# Patient Record
Sex: FEMALE | Race: WHITE | Employment: UNEMPLOYED | URBAN - METROPOLITAN AREA
[De-identification: names, ages, dates, MRNs, and addresses within clinical notes are randomized per-mention and may not be internally consistent; named-entity substitution may affect disease eponyms.]

---

## 2017-04-29 ENCOUNTER — HOSPITAL ENCOUNTER (EMERGENCY)
Age: 42
Discharge: HOME | End: 2017-04-29
Payer: COMMERCIAL

## 2017-04-29 VITALS
BODY MASS INDEX: 29.06 KG/M2 | RESPIRATION RATE: 18 BRPM | OXYGEN SATURATION: 95 % | SYSTOLIC BLOOD PRESSURE: 128 MMHG | DIASTOLIC BLOOD PRESSURE: 75 MMHG | TEMPERATURE: 98 F | HEIGHT: 63 IN | WEIGHT: 164 LBS | HEART RATE: 108 BPM

## 2017-04-29 DIAGNOSIS — H65.01 RIGHT ACUTE SEROUS OTITIS MEDIA, RECURRENCE NOT SPECIFIED: Primary | ICD-10-CM

## 2017-04-29 PROCEDURE — 6370000000 HC RX 637 (ALT 250 FOR IP): Performed by: PHYSICIAN ASSISTANT

## 2017-04-29 PROCEDURE — 99282 EMERGENCY DEPT VISIT SF MDM: CPT

## 2017-04-29 RX ORDER — IBUPROFEN 800 MG/1
800 TABLET ORAL ONCE
Status: COMPLETED | OUTPATIENT
Start: 2017-04-29 | End: 2017-04-29

## 2017-04-29 RX ORDER — ACETAMINOPHEN AND CODEINE PHOSPHATE 300; 30 MG/1; MG/1
1 TABLET ORAL 3 TIMES DAILY PRN
Qty: 10 TABLET | Refills: 0 | Status: SHIPPED | OUTPATIENT
Start: 2017-04-29 | End: 2020-05-29

## 2017-04-29 RX ORDER — GUAIFENESIN, PSEUDOEPHEDRINE HYDROCHLORIDE 600; 60 MG/1; MG/1
1 TABLET, EXTENDED RELEASE ORAL EVERY 12 HOURS
Qty: 14 TABLET | Refills: 0 | Status: SHIPPED | OUTPATIENT
Start: 2017-04-29 | End: 2017-05-06

## 2017-04-29 RX ORDER — AMOXICILLIN AND CLAVULANATE POTASSIUM 875; 125 MG/1; MG/1
1 TABLET, FILM COATED ORAL ONCE
Status: COMPLETED | OUTPATIENT
Start: 2017-04-29 | End: 2017-04-29

## 2017-04-29 RX ORDER — ASPIRIN 81 MG/1
81 TABLET ORAL DAILY
COMMUNITY

## 2017-04-29 RX ORDER — LEVOTHYROXINE SODIUM 0.15 MG/1
150 TABLET ORAL DAILY
COMMUNITY
End: 2020-01-26 | Stop reason: SDUPTHER

## 2017-04-29 RX ORDER — AMOXICILLIN AND CLAVULANATE POTASSIUM 875; 125 MG/1; MG/1
1 TABLET, FILM COATED ORAL 2 TIMES DAILY
Qty: 20 TABLET | Refills: 0 | Status: SHIPPED | OUTPATIENT
Start: 2017-04-29 | End: 2017-05-09

## 2017-04-29 RX ADMIN — IBUPROFEN 800 MG: 800 TABLET, FILM COATED ORAL at 19:20

## 2017-04-29 RX ADMIN — AMOXICILLIN AND CLAVULANATE POTASSIUM 1 TABLET: 875; 125 TABLET, FILM COATED ORAL at 19:20

## 2017-04-29 ASSESSMENT — ENCOUNTER SYMPTOMS
ALLERGIC/IMMUNOLOGIC NEGATIVE: 1
FACIAL SWELLING: 0
COLOR CHANGE: 0
SHORTNESS OF BREATH: 0
RHINORRHEA: 1
EYE PAIN: 0
VOMITING: 0
TROUBLE SWALLOWING: 0
DIARRHEA: 0
SINUS PRESSURE: 0
APNEA: 0
ABDOMINAL PAIN: 0

## 2017-04-29 ASSESSMENT — PAIN DESCRIPTION - DESCRIPTORS: DESCRIPTORS: STABBING

## 2017-04-29 ASSESSMENT — PAIN DESCRIPTION - FREQUENCY: FREQUENCY: CONTINUOUS

## 2017-04-29 ASSESSMENT — PAIN SCALES - GENERAL
PAINLEVEL_OUTOF10: 7
PAINLEVEL_OUTOF10: 7

## 2017-04-29 ASSESSMENT — PAIN DESCRIPTION - ORIENTATION: ORIENTATION: RIGHT

## 2017-04-29 ASSESSMENT — PAIN DESCRIPTION - PAIN TYPE: TYPE: ACUTE PAIN

## 2018-03-30 ENCOUNTER — HOSPITAL ENCOUNTER (OUTPATIENT)
Dept: GENERAL RADIOLOGY | Age: 43
Discharge: HOME | End: 2018-04-01
Payer: COMMERCIAL

## 2018-03-30 DIAGNOSIS — M79.629 PAIN IN AXILLA, UNSPECIFIED LATERALITY: ICD-10-CM

## 2018-03-30 PROCEDURE — 74018 RADEX ABDOMEN 1 VIEW: CPT

## 2018-03-30 PROCEDURE — 73090 X-RAY EXAM OF FOREARM: CPT

## 2018-03-30 PROCEDURE — 73060 X-RAY EXAM OF HUMERUS: CPT

## 2018-03-30 PROCEDURE — 73110 X-RAY EXAM OF WRIST: CPT

## 2018-03-30 PROCEDURE — 71046 X-RAY EXAM CHEST 2 VIEWS: CPT

## 2018-08-27 ENCOUNTER — HOSPITAL ENCOUNTER (EMERGENCY)
Age: 43
Discharge: HOME | End: 2018-08-27
Payer: COMMERCIAL

## 2018-08-27 VITALS
WEIGHT: 160 LBS | BODY MASS INDEX: 29.44 KG/M2 | RESPIRATION RATE: 16 BRPM | HEIGHT: 62 IN | SYSTOLIC BLOOD PRESSURE: 126 MMHG | DIASTOLIC BLOOD PRESSURE: 76 MMHG | HEART RATE: 101 BPM | OXYGEN SATURATION: 92 % | TEMPERATURE: 98.3 F

## 2018-08-27 DIAGNOSIS — K08.89 PAIN, DENTAL: Primary | ICD-10-CM

## 2018-08-27 PROCEDURE — 6370000000 HC RX 637 (ALT 250 FOR IP): Performed by: PHYSICIAN ASSISTANT

## 2018-08-27 PROCEDURE — 99282 EMERGENCY DEPT VISIT SF MDM: CPT

## 2018-08-27 RX ORDER — TRAMADOL HYDROCHLORIDE 50 MG/1
50 TABLET ORAL EVERY 6 HOURS PRN
Qty: 12 TABLET | Refills: 0 | Status: SHIPPED | OUTPATIENT
Start: 2018-08-27 | End: 2018-08-30

## 2018-08-27 RX ORDER — AMOXICILLIN 500 MG/1
500 CAPSULE ORAL 4 TIMES DAILY
COMMUNITY
End: 2020-03-20

## 2018-08-27 RX ORDER — HYDROCODONE BITARTRATE AND ACETAMINOPHEN 5; 325 MG/1; MG/1
1 TABLET ORAL ONCE
Status: COMPLETED | OUTPATIENT
Start: 2018-08-27 | End: 2018-08-27

## 2018-08-27 RX ORDER — IBUPROFEN 800 MG/1
800 TABLET ORAL EVERY 6 HOURS PRN
COMMUNITY
End: 2021-03-31

## 2018-08-27 RX ADMIN — HYDROCODONE BITARTRATE AND ACETAMINOPHEN 1 TABLET: 5; 325 TABLET ORAL at 17:50

## 2018-08-27 ASSESSMENT — ENCOUNTER SYMPTOMS
SHORTNESS OF BREATH: 0
DIARRHEA: 0
BACK PAIN: 0
EYE PAIN: 0
PHOTOPHOBIA: 0
SORE THROAT: 0
COUGH: 0
VOMITING: 0
ABDOMINAL PAIN: 0
RHINORRHEA: 0
NAUSEA: 0

## 2018-08-27 ASSESSMENT — PAIN DESCRIPTION - PAIN TYPE: TYPE: ACUTE PAIN

## 2018-08-27 ASSESSMENT — PAIN DESCRIPTION - LOCATION: LOCATION: TEETH

## 2018-08-27 ASSESSMENT — PAIN SCALES - GENERAL: PAINLEVEL_OUTOF10: 10

## 2018-08-27 NOTE — ED PROVIDER NOTES
126/76   Pulse: 101   Resp: 16   Temp: 98.3 °F (36.8 °C)   TempSrc: Oral   SpO2: 92%   Weight: 160 lb (72.6 kg)   Height: 5' 2\" (1.575 m)            Dental pain, no abscess, no fevers. F/u with dentist tmrw. Treated in ED for pain. PROCEDURES:  Unless otherwise noted below, none     Procedures      FINAL IMPRESSION      1.  Pain, dental          DISPOSITION/PLAN   DISPOSITION Decision To Discharge 08/27/2018 05:57:11 PM          Kamila Retana PA-C (electronically signed)  Attending Emergency Physician       Kamila Retana PA-C  08/27/18 0326

## 2019-08-07 ENCOUNTER — APPOINTMENT (OUTPATIENT)
Dept: GENERAL RADIOLOGY | Age: 44
End: 2019-08-07
Payer: COMMERCIAL

## 2019-08-07 ENCOUNTER — APPOINTMENT (OUTPATIENT)
Dept: CT IMAGING | Age: 44
End: 2019-08-07
Payer: COMMERCIAL

## 2019-08-07 ENCOUNTER — HOSPITAL ENCOUNTER (EMERGENCY)
Age: 44
Discharge: ANOTHER HOSPITAL | End: 2019-08-07
Payer: COMMERCIAL

## 2019-08-07 VITALS
DIASTOLIC BLOOD PRESSURE: 84 MMHG | SYSTOLIC BLOOD PRESSURE: 116 MMHG | OXYGEN SATURATION: 96 % | BODY MASS INDEX: 29.44 KG/M2 | HEART RATE: 63 BPM | TEMPERATURE: 98.1 F | HEIGHT: 62 IN | WEIGHT: 160 LBS | RESPIRATION RATE: 19 BRPM

## 2019-08-07 DIAGNOSIS — D49.7 PITUITARY TUMOR: Primary | ICD-10-CM

## 2019-08-07 DIAGNOSIS — N30.01 ACUTE CYSTITIS WITH HEMATURIA: ICD-10-CM

## 2019-08-07 DIAGNOSIS — H53.9 VISUAL CHANGES: ICD-10-CM

## 2019-08-07 DIAGNOSIS — E03.9 HYPOTHYROIDISM, UNSPECIFIED TYPE: ICD-10-CM

## 2019-08-07 LAB
ALBUMIN SERPL-MCNC: 4.6 G/DL (ref 3.5–4.6)
ALP BLD-CCNC: 84 U/L (ref 40–130)
ALT SERPL-CCNC: 33 U/L (ref 0–33)
ANION GAP SERPL CALCULATED.3IONS-SCNC: 14 MEQ/L (ref 9–15)
AST SERPL-CCNC: 38 U/L (ref 0–35)
BACTERIA: ABNORMAL /HPF
BASOPHILS ABSOLUTE: 0.1 K/UL (ref 0–0.2)
BASOPHILS RELATIVE PERCENT: 0.7 %
BILIRUB SERPL-MCNC: <0.2 MG/DL (ref 0.2–0.7)
BILIRUBIN URINE: NEGATIVE
BLOOD, URINE: ABNORMAL
BUN BLDV-MCNC: 8 MG/DL (ref 6–20)
CALCIUM SERPL-MCNC: 9.5 MG/DL (ref 8.5–9.9)
CASTS: ABNORMAL /LPF
CHLORIDE BLD-SCNC: 99 MEQ/L (ref 95–107)
CLARITY: ABNORMAL
CO2: 25 MEQ/L (ref 20–31)
COLOR: YELLOW
CREAT SERPL-MCNC: 0.79 MG/DL (ref 0.5–0.9)
CRYSTALS, UA: ABNORMAL
EKG ATRIAL RATE: 67 BPM
EKG P AXIS: 39 DEGREES
EKG P-R INTERVAL: 154 MS
EKG Q-T INTERVAL: 456 MS
EKG QRS DURATION: 90 MS
EKG QTC CALCULATION (BAZETT): 481 MS
EKG R AXIS: 92 DEGREES
EKG T AXIS: 106 DEGREES
EKG VENTRICULAR RATE: 67 BPM
EOSINOPHILS ABSOLUTE: 0.3 K/UL (ref 0–0.7)
EOSINOPHILS RELATIVE PERCENT: 2.7 %
EPITHELIAL CELLS, UA: >100 /HPF (ref 0–5)
GFR AFRICAN AMERICAN: >60
GFR NON-AFRICAN AMERICAN: >60
GLOBULIN: 3.4 G/DL (ref 2.3–3.5)
GLUCOSE BLD-MCNC: 136 MG/DL (ref 60–115)
GLUCOSE BLD-MCNC: 99 MG/DL (ref 70–99)
GLUCOSE URINE: NEGATIVE MG/DL
HCG, URINE, POC: NEGATIVE
HCT VFR BLD CALC: 49.1 % (ref 37–47)
HEMOGLOBIN: 17.2 G/DL (ref 12–16)
KETONES, URINE: ABNORMAL MG/DL
LEUKOCYTE ESTERASE, URINE: ABNORMAL
LYMPHOCYTES ABSOLUTE: 2.3 K/UL (ref 1–4.8)
LYMPHOCYTES RELATIVE PERCENT: 20.7 %
Lab: NORMAL
MCH RBC QN AUTO: 31.7 PG (ref 27–31.3)
MCHC RBC AUTO-ENTMCNC: 35.1 % (ref 33–37)
MCV RBC AUTO: 90.4 FL (ref 82–100)
MONOCYTES ABSOLUTE: 0.6 K/UL (ref 0.2–0.8)
MONOCYTES RELATIVE PERCENT: 5 %
NEGATIVE QC PASS/FAIL: NORMAL
NEUTROPHILS ABSOLUTE: 8 K/UL (ref 1.4–6.5)
NEUTROPHILS RELATIVE PERCENT: 70.9 %
NITRITE, URINE: NEGATIVE
PDW BLD-RTO: 14.5 % (ref 11.5–14.5)
PERFORMED ON: ABNORMAL
PH UA: 5.5 (ref 5–9)
PLATELET # BLD: 352 K/UL (ref 130–400)
POSITIVE QC PASS/FAIL: NORMAL
POTASSIUM SERPL-SCNC: 3.4 MEQ/L (ref 3.4–4.9)
PROTEIN UA: 30 MG/DL
RBC # BLD: 5.43 M/UL (ref 4.2–5.4)
RBC UA: ABNORMAL /HPF (ref 0–2)
SODIUM BLD-SCNC: 138 MEQ/L (ref 135–144)
SPECIFIC GRAVITY UA: 1.02 (ref 1–1.03)
T4 FREE: <0.03 NG/DL (ref 0.84–1.68)
TOTAL PROTEIN: 8 G/DL (ref 6.3–8)
TSH REFLEX: 511 UIU/ML (ref 0.44–3.86)
URINE REFLEX TO CULTURE: YES
UROBILINOGEN, URINE: 1 E.U./DL
WBC # BLD: 11.2 K/UL (ref 4.8–10.8)
WBC UA: ABNORMAL /HPF (ref 0–5)

## 2019-08-07 PROCEDURE — 2580000003 HC RX 258: Performed by: NURSE PRACTITIONER

## 2019-08-07 PROCEDURE — 96365 THER/PROPH/DIAG IV INF INIT: CPT

## 2019-08-07 PROCEDURE — 84439 ASSAY OF FREE THYROXINE: CPT

## 2019-08-07 PROCEDURE — 6360000002 HC RX W HCPCS: Performed by: NURSE PRACTITIONER

## 2019-08-07 PROCEDURE — 71045 X-RAY EXAM CHEST 1 VIEW: CPT

## 2019-08-07 PROCEDURE — 87086 URINE CULTURE/COLONY COUNT: CPT

## 2019-08-07 PROCEDURE — 70450 CT HEAD/BRAIN W/O DYE: CPT

## 2019-08-07 PROCEDURE — 84443 ASSAY THYROID STIM HORMONE: CPT

## 2019-08-07 PROCEDURE — 85025 COMPLETE CBC W/AUTO DIFF WBC: CPT

## 2019-08-07 PROCEDURE — 36415 COLL VENOUS BLD VENIPUNCTURE: CPT

## 2019-08-07 PROCEDURE — 99284 EMERGENCY DEPT VISIT MOD MDM: CPT

## 2019-08-07 PROCEDURE — 96375 TX/PRO/DX INJ NEW DRUG ADDON: CPT

## 2019-08-07 PROCEDURE — 81001 URINALYSIS AUTO W/SCOPE: CPT

## 2019-08-07 PROCEDURE — 80053 COMPREHEN METABOLIC PANEL: CPT

## 2019-08-07 PROCEDURE — 93005 ELECTROCARDIOGRAM TRACING: CPT | Performed by: NURSE PRACTITIONER

## 2019-08-07 RX ORDER — HYDROCHLOROTHIAZIDE 12.5 MG/1
12.5 TABLET ORAL DAILY
COMMUNITY
End: 2021-03-31 | Stop reason: SDUPTHER

## 2019-08-07 RX ORDER — 0.9 % SODIUM CHLORIDE 0.9 %
1000 INTRAVENOUS SOLUTION INTRAVENOUS ONCE
Status: COMPLETED | OUTPATIENT
Start: 2019-08-07 | End: 2019-08-07

## 2019-08-07 RX ORDER — KETOROLAC TROMETHAMINE 30 MG/ML
30 INJECTION, SOLUTION INTRAMUSCULAR; INTRAVENOUS ONCE
Status: COMPLETED | OUTPATIENT
Start: 2019-08-07 | End: 2019-08-07

## 2019-08-07 RX ADMIN — SODIUM CHLORIDE 1000 ML: 9 INJECTION, SOLUTION INTRAVENOUS at 09:37

## 2019-08-07 RX ADMIN — CEFTRIAXONE SODIUM 1 G: 1 INJECTION, POWDER, FOR SOLUTION INTRAMUSCULAR; INTRAVENOUS at 13:01

## 2019-08-07 RX ADMIN — KETOROLAC TROMETHAMINE 30 MG: 30 INJECTION, SOLUTION INTRAMUSCULAR at 12:45

## 2019-08-07 ASSESSMENT — ENCOUNTER SYMPTOMS
RHINORRHEA: 0
NAUSEA: 0
ABDOMINAL PAIN: 0
DIARRHEA: 0
EYE PAIN: 0
COUGH: 0
BACK PAIN: 0
SHORTNESS OF BREATH: 0
SORE THROAT: 0
VOMITING: 0
PHOTOPHOBIA: 0

## 2019-08-07 ASSESSMENT — PAIN DESCRIPTION - DESCRIPTORS: DESCRIPTORS: CRAMPING

## 2019-08-07 ASSESSMENT — PAIN DESCRIPTION - DIRECTION: RADIATING_TOWARDS: MUSCLE CRAMPING

## 2019-08-07 ASSESSMENT — PAIN SCALES - GENERAL
PAINLEVEL_OUTOF10: 5
PAINLEVEL_OUTOF10: 5

## 2019-08-07 ASSESSMENT — PAIN DESCRIPTION - PAIN TYPE: TYPE: ACUTE PAIN

## 2019-08-07 NOTE — ED NOTES
Labs sent  Pt made aware that we need urine, states she is unable to at this time     Verner Louder, RN  08/07/19 6785

## 2019-08-07 NOTE — ED PROVIDER NOTES
reconstruction, and/or weight based dosing when appropriate to reduce radiation dose to as low as reasonably achievable. XR CHEST PORTABLE   Final Result      No acute intrathoracic process. ED BEDSIDE ULTRASOUND:   Performed by ED Physician - none    LABS:  Labs Reviewed   CBC WITH AUTO DIFFERENTIAL - Abnormal; Notable for the following components:       Result Value    WBC 11.2 (*)     RBC 5.43 (*)     Hemoglobin 17.2 (*)     Hematocrit 49.1 (*)     MCH 31.7 (*)     Neutrophils # 8.0 (*)     All other components within normal limits   COMPREHENSIVE METABOLIC PANEL - Abnormal; Notable for the following components:    AST 38 (*)     All other components within normal limits   URINE RT REFLEX TO CULTURE - Abnormal; Notable for the following components:    Clarity, UA CLOUDY (*)     Ketones, Urine TRACE (*)     Blood, Urine MODERATE (*)     Protein, UA 30 (*)     Leukocyte Esterase, Urine MODERATE (*)     All other components within normal limits   TSH WITH REFLEX - Abnormal; Notable for the following components:    .000 (*)     All other components within normal limits   MICROSCOPIC URINALYSIS - Abnormal; Notable for the following components:    RBC, UA 3-5 (*)     Bacteria, UA FEW (*)     WBC, UA 10-20 (*)     Epi Cells >100 (*)     All other components within normal limits   T4, FREE - Abnormal; Notable for the following components:    T4 Free <0.03 (*)     All other components within normal limits   POCT GLUCOSE - Abnormal; Notable for the following components:    POC Glucose 136 (*)     All other components within normal limits   URINE CULTURE   POC PREGNANCY UR-QUAL       All other labs were within normal range or not returned as of this dictation.     EMERGENCY DEPARTMENT COURSE and DIFFERENTIAL DIAGNOSIS/MDM:   Vitals:    Vitals:    08/07/19 0854 08/07/19 0940 08/07/19 1104   BP: (!) 135/93  105/88   Pulse: 77  77   Resp: 18 17 18   Temp: 98.1 °F (36.7 °C)     TempSrc: Oral     SpO2: 97%

## 2019-08-08 LAB — URINE CULTURE, ROUTINE: NORMAL

## 2019-08-08 PROCEDURE — 93010 ELECTROCARDIOGRAM REPORT: CPT | Performed by: INTERNAL MEDICINE

## 2019-12-03 ENCOUNTER — HOSPITAL ENCOUNTER (EMERGENCY)
Age: 44
Discharge: HOME | End: 2019-12-03
Attending: EMERGENCY MEDICINE
Payer: COMMERCIAL

## 2019-12-03 VITALS
DIASTOLIC BLOOD PRESSURE: 68 MMHG | HEIGHT: 62 IN | BODY MASS INDEX: 29.44 KG/M2 | TEMPERATURE: 98.6 F | HEART RATE: 105 BPM | WEIGHT: 160 LBS | RESPIRATION RATE: 16 BRPM | SYSTOLIC BLOOD PRESSURE: 124 MMHG | OXYGEN SATURATION: 96 %

## 2019-12-03 DIAGNOSIS — Z13.9 ENCOUNTER FOR MEDICAL SCREENING EXAMINATION: ICD-10-CM

## 2019-12-03 DIAGNOSIS — M54.50 ACUTE LEFT-SIDED LOW BACK PAIN WITHOUT SCIATICA: Primary | ICD-10-CM

## 2019-12-03 PROCEDURE — 96372 THER/PROPH/DIAG INJ SC/IM: CPT

## 2019-12-03 PROCEDURE — 6360000002 HC RX W HCPCS: Performed by: EMERGENCY MEDICINE

## 2019-12-03 PROCEDURE — 6370000000 HC RX 637 (ALT 250 FOR IP): Performed by: EMERGENCY MEDICINE

## 2019-12-03 PROCEDURE — 99282 EMERGENCY DEPT VISIT SF MDM: CPT

## 2019-12-03 RX ORDER — CYCLOBENZAPRINE HCL 10 MG
5 TABLET ORAL ONCE
Status: COMPLETED | OUTPATIENT
Start: 2019-12-03 | End: 2019-12-03

## 2019-12-03 RX ORDER — HYDROCODONE BITARTRATE AND ACETAMINOPHEN 5; 325 MG/1; MG/1
1 TABLET ORAL ONCE
Status: COMPLETED | OUTPATIENT
Start: 2019-12-03 | End: 2019-12-03

## 2019-12-03 RX ORDER — KETOROLAC TROMETHAMINE 15 MG/ML
15 INJECTION, SOLUTION INTRAMUSCULAR; INTRAVENOUS ONCE
Status: COMPLETED | OUTPATIENT
Start: 2019-12-03 | End: 2019-12-03

## 2019-12-03 RX ADMIN — HYDROCODONE BITARTRATE AND ACETAMINOPHEN 1 TABLET: 5; 325 TABLET ORAL at 19:27

## 2019-12-03 RX ADMIN — KETOROLAC TROMETHAMINE 15 MG: 15 INJECTION, SOLUTION INTRAMUSCULAR; INTRAVENOUS at 19:27

## 2019-12-03 RX ADMIN — CYCLOBENZAPRINE HYDROCHLORIDE 5 MG: 10 TABLET, FILM COATED ORAL at 19:27

## 2019-12-03 ASSESSMENT — PAIN SCALES - GENERAL
PAINLEVEL_OUTOF10: 10
PAINLEVEL_OUTOF10: 8
PAINLEVEL_OUTOF10: 10

## 2019-12-03 ASSESSMENT — PAIN DESCRIPTION - DESCRIPTORS: DESCRIPTORS: ACHING;THROBBING;SHOOTING

## 2019-12-03 ASSESSMENT — PAIN DESCRIPTION - ORIENTATION: ORIENTATION: LEFT

## 2019-12-03 ASSESSMENT — PAIN DESCRIPTION - FREQUENCY: FREQUENCY: CONTINUOUS

## 2019-12-03 ASSESSMENT — PAIN DESCRIPTION - LOCATION: LOCATION: GROIN

## 2019-12-03 ASSESSMENT — PAIN DESCRIPTION - PAIN TYPE: TYPE: ACUTE PAIN

## 2019-12-09 ENCOUNTER — APPOINTMENT (OUTPATIENT)
Dept: GENERAL RADIOLOGY | Age: 44
End: 2019-12-09
Payer: COMMERCIAL

## 2019-12-09 ENCOUNTER — HOSPITAL ENCOUNTER (EMERGENCY)
Age: 44
Discharge: HOME | End: 2019-12-09
Payer: COMMERCIAL

## 2019-12-09 VITALS
RESPIRATION RATE: 16 BRPM | TEMPERATURE: 98 F | BODY MASS INDEX: 29.44 KG/M2 | OXYGEN SATURATION: 98 % | HEART RATE: 99 BPM | HEIGHT: 62 IN | WEIGHT: 160 LBS | DIASTOLIC BLOOD PRESSURE: 90 MMHG | SYSTOLIC BLOOD PRESSURE: 165 MMHG

## 2019-12-09 DIAGNOSIS — M25.562 ACUTE PAIN OF LEFT KNEE: Primary | ICD-10-CM

## 2019-12-09 PROCEDURE — 99283 EMERGENCY DEPT VISIT LOW MDM: CPT

## 2019-12-09 PROCEDURE — 73562 X-RAY EXAM OF KNEE 3: CPT

## 2019-12-09 RX ORDER — CYCLOBENZAPRINE HCL 10 MG
10 TABLET ORAL 3 TIMES DAILY PRN
Qty: 15 TABLET | Refills: 0 | Status: SHIPPED | OUTPATIENT
Start: 2019-12-09 | End: 2019-12-19

## 2019-12-09 RX ORDER — NAPROXEN 500 MG/1
500 TABLET ORAL 2 TIMES DAILY
Qty: 20 TABLET | Refills: 0 | Status: SHIPPED | OUTPATIENT
Start: 2019-12-09 | End: 2020-10-21

## 2019-12-09 ASSESSMENT — PAIN SCALES - GENERAL: PAINLEVEL_OUTOF10: 9

## 2019-12-09 ASSESSMENT — PAIN DESCRIPTION - ORIENTATION: ORIENTATION: LEFT

## 2019-12-09 ASSESSMENT — ENCOUNTER SYMPTOMS
COUGH: 0
ABDOMINAL PAIN: 0
SHORTNESS OF BREATH: 0
BACK PAIN: 0

## 2019-12-09 ASSESSMENT — PAIN DESCRIPTION - LOCATION: LOCATION: KNEE

## 2019-12-09 ASSESSMENT — PAIN DESCRIPTION - PAIN TYPE: TYPE: ACUTE PAIN

## 2019-12-09 ASSESSMENT — PAIN DESCRIPTION - DESCRIPTORS: DESCRIPTORS: ACHING

## 2020-01-26 ENCOUNTER — HOSPITAL ENCOUNTER (EMERGENCY)
Age: 45
Discharge: HOME | End: 2020-01-26
Payer: COMMERCIAL

## 2020-01-26 VITALS
HEART RATE: 76 BPM | DIASTOLIC BLOOD PRESSURE: 82 MMHG | TEMPERATURE: 98.1 F | OXYGEN SATURATION: 96 % | SYSTOLIC BLOOD PRESSURE: 120 MMHG | RESPIRATION RATE: 19 BRPM | BODY MASS INDEX: 30.18 KG/M2 | WEIGHT: 164 LBS | HEIGHT: 62 IN

## 2020-01-26 PROCEDURE — 6370000000 HC RX 637 (ALT 250 FOR IP): Performed by: NURSE PRACTITIONER

## 2020-01-26 PROCEDURE — 99282 EMERGENCY DEPT VISIT SF MDM: CPT

## 2020-01-26 RX ORDER — LEVOTHYROXINE SODIUM 0.15 MG/1
150 TABLET ORAL DAILY
Qty: 30 TABLET | Refills: 5 | Status: SHIPPED | OUTPATIENT
Start: 2020-01-26 | End: 2020-11-10

## 2020-01-26 RX ORDER — TOBRAMYCIN 3 MG/ML
2 SOLUTION/ DROPS OPHTHALMIC
Status: DISCONTINUED | OUTPATIENT
Start: 2020-01-26 | End: 2020-01-26 | Stop reason: HOSPADM

## 2020-01-26 RX ADMIN — TOBRAMYCIN OPHTHALMIC SOLUTION 2 DROP: 3 SOLUTION/ DROPS OPHTHALMIC at 14:24

## 2020-01-26 NOTE — ED TRIAGE NOTES
Pt to ER with c/o bilateral eye irritation and needs thyroid med refilled, pt a&ox4, resp even and unlabored

## 2020-01-27 ASSESSMENT — ENCOUNTER SYMPTOMS
BACK PAIN: 0
EYE PAIN: 0
COUGH: 0
SORE THROAT: 0
VOMITING: 0
ABDOMINAL PAIN: 0
PHOTOPHOBIA: 0
RHINORRHEA: 0
NAUSEA: 0
EYE REDNESS: 1
SHORTNESS OF BREATH: 0
EYE DISCHARGE: 1
DIARRHEA: 0

## 2020-01-27 ASSESSMENT — VISUAL ACUITY: OU: 1

## 2020-01-27 NOTE — ED PROVIDER NOTES
[address] [facility] ED  EMERGENCY DEPARTMENT ENCOUNTER      Pt Name: Rachel Mccollum  MRN: [id number]  [place] 1975  Date of evaluation: 1/26/2020  Provider: Mariangel Angeles       Chief Complaint   Patient presents with    Conjunctivitis    Other     needs refill on thyroid med         HISTORY OF PRESENT ILLNESS   (Location/Symptom, Timing/Onset,Context/Setting, Quality, Duration, Modifying Factors, Severity)  Note limiting factors. Rachel Mccollum is a 39 y.o. female who presents to the emergency department with exposure to children who have had pink eye. She reports that her eyes had crust this am.  She is also out of her thyroid med. No other acute c/o. No vision change. Location/Symptom - exposure to pink eye  Onset - 2 days  Context/Setting - as above  Quality - exposure  Duration - 2 days ago  Modifying Factors - none  Severity - mild         Nursing Notes were reviewed. REVIEW OF SYSTEMS    (2-9 systems for level 4, 10 or more for level 5)     Review of Systems   Constitutional: Negative for chills, diaphoresis, fatigue and fever. HENT: Negative for congestion, rhinorrhea and sore throat. Eyes: Positive for discharge and redness. Negative for photophobia and pain. Respiratory: Negative for cough and shortness of breath. Cardiovascular: Negative for chest pain and palpitations. Gastrointestinal: Negative for abdominal pain, diarrhea, nausea and vomiting. Genitourinary: Negative for dysuria and flank pain. Musculoskeletal: Negative for back pain. Skin: Negative for rash. Neurological: Negative for dizziness, light-headedness and headaches. Psychiatric/Behavioral: Negative. All other systems reviewed and are negative. Except as noted above the remainder of the review of systems was reviewed and negative.        PAST MEDICAL HISTORY     Past Medical History:   Diagnosis Date    Hypertension     Thyroid disease      Past Surgical History:   Procedure Laterality Date    CHOLECYSTECTOMY      TUBAL LIGATION       Social History     Socioeconomic History    Marital status: Single     Spouse name: None    Number of children: None    Years of education: None    Highest education level: None   Occupational History    None   Social Needs    Financial resource strain: None    Food insecurity:     Worry: None     Inability: None    Transportation needs:     Medical: None     Non-medical: None   Tobacco Use    Smoking status: Current Every Day Smoker     Packs/day: 1.00     Years: 23.00     Pack years: 23.00    Smokeless tobacco: Never Used   Substance and Sexual Activity    Alcohol use: No     Comment: socially    Drug use: No    Sexual activity: Not Currently   Lifestyle    Physical activity:     Days per week: None     Minutes per session: None    Stress: None   Relationships    Social connections:     Talks on phone: None     Gets together: None     Attends Denominational service: None     Active member of club or organization: None     Attends meetings of clubs or organizations: None     Relationship status: None    Intimate partner violence:     Fear of current or ex partner: None     Emotionally abused: None     Physically abused: None     Forced sexual activity: None   Other Topics Concern    None   Social History Narrative    None       SCREENINGS             PHYSICAL EXAM    (up to 7 for level 4, 8 or more for level 5)     ED Triage Vitals [01/26/20 1314]   BP Temp Temp Source Pulse Resp SpO2 Height Weight   120/82 98.1 °F (36.7 °C) Oral 76 19 96 % 5' 2\" (1.575 m) 164 lb (74.4 kg)       Physical Exam  Vitals signs and nursing note reviewed. Constitutional:       General: She is not in acute distress. Appearance: Normal appearance. She is well-developed. She is not diaphoretic. HENT:      Head: Normocephalic and atraumatic. Eyes:      General: Lids are normal. Vision grossly intact. Gaze aligned appropriately.  No Vitals:    Vitals:    01/26/20 1314   BP: 120/82   Pulse: 76   Resp: 19   Temp: 98.1 °F (36.7 °C)   TempSrc: Oral   SpO2: 96%   Weight: 164 lb (74.4 kg)   Height: 5' 2\" (1.575 m)            MDM nontoxic and in no distress. VSnormal.  Exam normal.  Will provide abx as she was exposed to pink eye by history. Will provide refill of synthroid as she ran out yesterday. Has apmnt with pcp in 2 days. Extended discharge instructions provided to patient including signs, symptoms and red flags that they should return to the emergency department. They express good understanding. Standard anticipatory guidance given to patient upon discharge. Have given them a specific time frame in which to follow-up and who to follow-up with. I have also advised them that they should return to the emergency department if they get worse, or not getting better or develop any new or concerning symptoms. Patient demonstrates understanding and all questions were answered. CRITICAL CARE TIME       CONSULTS:  None    PROCEDURES:  Unless otherwise noted below, none     Procedures    FINAL IMPRESSION      1.  Conjunctivitis of both eyes, unspecified conjunctivitis type          DISPOSITION/PLAN   DISPOSITION Decision To Discharge 01/26/2020 02:05:04 PM      PATIENT REFERRED TO:  Nan Golden MD  [address]  [phone]    Call in 1 day  For continued evaluation and management      DISCHARGE MEDICATIONS:  Discharge Medication List as of 1/26/2020  2:07 PM             (Please notethat portions of this note were completed with a voice recognition program.  Efforts were made to edit the dictations but occasionally words are mis-transcribed.)    PELON Lind CNP (electronically signed)  Attending Emergency Physician          PELON Lind CNP  01/27/20 5666

## 2020-03-01 ENCOUNTER — HOSPITAL ENCOUNTER (EMERGENCY)
Age: 45
Discharge: HOME | End: 2020-03-01
Payer: COMMERCIAL

## 2020-03-01 ENCOUNTER — APPOINTMENT (OUTPATIENT)
Dept: GENERAL RADIOLOGY | Age: 45
End: 2020-03-01
Payer: COMMERCIAL

## 2020-03-01 VITALS
SYSTOLIC BLOOD PRESSURE: 138 MMHG | HEART RATE: 91 BPM | HEIGHT: 63 IN | TEMPERATURE: 98.2 F | WEIGHT: 154 LBS | RESPIRATION RATE: 18 BRPM | OXYGEN SATURATION: 100 % | DIASTOLIC BLOOD PRESSURE: 105 MMHG | BODY MASS INDEX: 27.29 KG/M2

## 2020-03-01 PROCEDURE — 99284 EMERGENCY DEPT VISIT MOD MDM: CPT

## 2020-03-01 PROCEDURE — 26742 TREAT FINGER FRACTURE EACH: CPT

## 2020-03-01 PROCEDURE — 6370000000 HC RX 637 (ALT 250 FOR IP): Performed by: PHYSICIAN ASSISTANT

## 2020-03-01 PROCEDURE — 73130 X-RAY EXAM OF HAND: CPT

## 2020-03-01 PROCEDURE — 73140 X-RAY EXAM OF FINGER(S): CPT

## 2020-03-01 RX ORDER — IBUPROFEN 800 MG/1
800 TABLET ORAL ONCE
Status: COMPLETED | OUTPATIENT
Start: 2020-03-01 | End: 2020-03-01

## 2020-03-01 RX ORDER — OXYCODONE HYDROCHLORIDE AND ACETAMINOPHEN 5; 325 MG/1; MG/1
1 TABLET ORAL ONCE
Status: COMPLETED | OUTPATIENT
Start: 2020-03-01 | End: 2020-03-01

## 2020-03-01 RX ORDER — OXYCODONE HYDROCHLORIDE AND ACETAMINOPHEN 5; 325 MG/1; MG/1
1 TABLET ORAL EVERY 6 HOURS PRN
Qty: 12 TABLET | Refills: 0 | Status: SHIPPED | OUTPATIENT
Start: 2020-03-01 | End: 2020-03-04

## 2020-03-01 RX ADMIN — OXYCODONE HYDROCHLORIDE AND ACETAMINOPHEN 1 TABLET: 5; 325 TABLET ORAL at 10:45

## 2020-03-01 RX ADMIN — IBUPROFEN 800 MG: 800 TABLET, FILM COATED ORAL at 09:48

## 2020-03-01 ASSESSMENT — PAIN SCALES - GENERAL
PAINLEVEL_OUTOF10: 10
PAINLEVEL_OUTOF10: 5

## 2020-03-01 ASSESSMENT — PAIN DESCRIPTION - DESCRIPTORS
DESCRIPTORS: THROBBING
DESCRIPTORS: ACHING
DESCRIPTORS: THROBBING

## 2020-03-01 ASSESSMENT — PAIN DESCRIPTION - LOCATION
LOCATION: HAND

## 2020-03-01 ASSESSMENT — ENCOUNTER SYMPTOMS
RHINORRHEA: 0
ABDOMINAL PAIN: 0
COLOR CHANGE: 0
EYE DISCHARGE: 0
CONSTIPATION: 0
SHORTNESS OF BREATH: 0
SORE THROAT: 0
ABDOMINAL DISTENTION: 0

## 2020-03-01 ASSESSMENT — PAIN DESCRIPTION - ONSET: ONSET: SUDDEN

## 2020-03-01 ASSESSMENT — PAIN DESCRIPTION - ORIENTATION
ORIENTATION: RIGHT

## 2020-03-01 ASSESSMENT — PAIN DESCRIPTION - PAIN TYPE
TYPE: ACUTE PAIN

## 2020-03-01 ASSESSMENT — PAIN DESCRIPTION - PROGRESSION
CLINICAL_PROGRESSION: GRADUALLY IMPROVING
CLINICAL_PROGRESSION: NOT CHANGED

## 2020-03-01 ASSESSMENT — PAIN - FUNCTIONAL ASSESSMENT: PAIN_FUNCTIONAL_ASSESSMENT: 0-10

## 2020-03-01 NOTE — ED PROVIDER NOTES
Active member of club or organization: Not on file     Attends meetings of clubs or organizations: Not on file     Relationship status: Not on file    Intimate partner violence:     Fear of current or ex partner: Not on file     Emotionally abused: Not on file     Physically abused: Not on file     Forced sexual activity: Not on file   Other Topics Concern    Not on file   Social History Narrative    Not on file       SCREENINGS      @[id number])@      PHYSICAL EXAM    (up to 7 for level 4, 8 or more for level 5)     ED Triage Vitals [03/01/20 0938]   BP Temp Temp Source Pulse Resp SpO2 Height Weight   (!) 132/117 98.2 °F (36.8 °C) Oral 95 18 99 % -- --       Physical Exam  Constitutional:       General: She is not in acute distress. Appearance: She is well-developed. She is not ill-appearing, toxic-appearing or diaphoretic. HENT:      Head: Normocephalic. Right Ear: Tympanic membrane normal.      Left Ear: Tympanic membrane normal.      Nose: Nose normal.      Mouth/Throat:      Mouth: Mucous membranes are moist.   Eyes:      Pupils: Pupils are equal, round, and reactive to light. Neck:      Musculoskeletal: Neck supple. Vascular: No JVD. Trachea: No tracheal deviation. Cardiovascular:      Rate and Rhythm: Normal rate. Pulmonary:      Effort: Pulmonary effort is normal. No respiratory distress. Breath sounds: No wheezing or rales. Chest:      Chest wall: No tenderness. Abdominal:      General: There is no distension. Palpations: There is no mass. Tenderness: There is no abdominal tenderness. There is no guarding. Musculoskeletal:         General: No deformity. Hands:       Comments: Right hand shows no deformity, no crepitus, full movement of fingers, with increased pain. Most pain to 5th finger No pain on palpation to snuffbox.   Patient is able to move thumb through full range of motion without increased pain, there is no edema, no ecchymosis, no cut scrapes or abrasions are noted. Skin:     General: Skin is warm. Neurological:      General: No focal deficit present. Mental Status: She is alert and oriented to person, place, and time. Coordination: Coordination normal.   Psychiatric:         Mood and Affect: Mood normal.         DIAGNOSTIC RESULTS     EKG: All EKG's are interpreted by the Emergency Department Physician who either signs or Co-signsthis chart in the absence of a cardiologist.        RADIOLOGY:   [facility] such as CT, Ultrasound and MRI are read by the radiologist. Marta Roche radiographic images are visualized and preliminarily interpreted by the emergency physician with the below findings:    X-ray of the right hand shows chip fracture and dislocation at the MIP joint right fifth finger. Fifth finger post reduction alignment has improved significantly, sucessful reduction. Interpretation per the Radiologist below, if available at the time ofthis note:    XR HAND RIGHT (MIN 3 VIEWS)    (Results Pending)   XR FINGER RIGHT (MIN 2 VIEWS)    (Results Pending)         ED BEDSIDE ULTRASOUND:   Performed by ED Physician - none    LABS:  Labs Reviewed - No data to display    All other labs were within normal range or not returned as of this dictation. EMERGENCY DEPARTMENT COURSE and DIFFERENTIAL DIAGNOSIS/MDM:   Vitals:    Vitals:    03/01/20 0938 03/01/20 0942 03/01/20 1046   BP: (!) 132/117 (!) 135/101 (!) 138/105   Pulse: 95  91   Resp: 18  18   Temp: 98.2 °F (36.8 °C)     TempSrc: Oral     SpO2: 99%  100%   Weight:  154 lb (69.9 kg)    Height:  5' 3\" (1.6 m)             MDM  Number of Diagnoses or Management Options  Closed dislocation of finger, initial encounter:   Closed nondisplaced fracture of distal phalanx of right little finger, initial encounter:       CRITICAL CARE TIME   Total Critical Care time was 0 minutes, excluding separately reportableprocedures.   There was a high probability of clinicallysignificant/life threatening deterioration in the patient's condition which required my urgent intervention. CONSULTS:  None    PROCEDURES:  Unless otherwise noted below, none     Ortho Injury  Date/Time: 3/1/2020 10:43 AM  Performed by: Eboni Brooks PA-C  Authorized by: Eboni Brooks PA-C   Consent given by: patient  Patient understanding: patient states understanding of the procedure being performed  Patient identity confirmed: verbally with patient  Injury location: finger  Location details: right little finger  Injury type: fracture  Fracture type: middle phalanx  MCP joint involved: no  Any IP joint involved: yes  Pre-procedure distal perfusion: normal  Pre-procedure neurological function: normal  Pre-procedure range of motion: reduced    Anesthesia:  Local anesthesia used: yes  Local Anesthetic: lidocaine 1% with epinephrine    Sedation:  Patient sedated: no    Manipulation performed: yes  Skin traction used: yes  Skeletal traction used: yes  Reduction successful: yes  X-ray confirmed reduction: yes  Immobilization: splint  Splint type: ulnar gutter  Supplies used: Ortho-Glass  Post-procedure neurovascular assessment: post-procedure neurovascularly intact  Post-procedure distal perfusion: normal  Post-procedure neurological function: normal  Post-procedure range of motion: unchanged  Patient tolerance: Patient tolerated the procedure well with no immediate complications  Comments: Reduction of fracture dislocation right 5th finger          FINAL IMPRESSION      1. Closed nondisplaced fracture of distal phalanx of right little finger, initial encounter    2.  Closed dislocation of finger, initial encounter          DISPOSITION/PLAN   DISPOSITION Decision To Discharge 03/01/2020 10:58:47 AM      PATIENT REFERRED TO:  Jose Slade MD  [address] [facility]  [phone]    In 1 week      [facility]  [facility]  [address] [facility] [address]  [address] [facility]  In 2 days        DISCHARGE MEDICATIONS:  New Prescriptions    OXYCODONE-ACETAMINOPHEN (PERCOCET) 5-325 MG PER TABLET    Take 1 tablet by mouth every 6 hours as needed for Pain for up to 3 days. Intended supply: 3 days.  Take lowest dose possible to manage pain          (Please note that portions of this note were completed with a voice recognition program.  Efforts were made to edit the dictations but occasionally words are mis-transcribed.)    Aliza Quiels PA-C (electronically signed)  Attending Emergency Physician         Aliza Quiles PA-C  03/01/20 1103

## 2020-03-20 ENCOUNTER — APPOINTMENT (OUTPATIENT)
Dept: CT IMAGING | Age: 45
End: 2020-03-20
Payer: COMMERCIAL

## 2020-03-20 ENCOUNTER — HOSPITAL ENCOUNTER (EMERGENCY)
Age: 45
Discharge: HOME | End: 2020-03-20
Payer: COMMERCIAL

## 2020-03-20 VITALS
HEIGHT: 63 IN | TEMPERATURE: 98 F | OXYGEN SATURATION: 97 % | SYSTOLIC BLOOD PRESSURE: 113 MMHG | DIASTOLIC BLOOD PRESSURE: 70 MMHG | BODY MASS INDEX: 28.35 KG/M2 | RESPIRATION RATE: 18 BRPM | HEART RATE: 93 BPM | WEIGHT: 160 LBS

## 2020-03-20 LAB
ALBUMIN SERPL-MCNC: 4.3 G/DL (ref 3.5–4.6)
ALP BLD-CCNC: 89 U/L (ref 40–130)
ALT SERPL-CCNC: 22 U/L (ref 0–33)
AMPHETAMINE SCREEN, URINE: ABNORMAL
ANION GAP SERPL CALCULATED.3IONS-SCNC: 16 MEQ/L (ref 9–15)
AST SERPL-CCNC: 23 U/L (ref 0–35)
BACTERIA: NEGATIVE /HPF
BARBITURATE SCREEN URINE: ABNORMAL
BASOPHILS ABSOLUTE: 0 K/UL (ref 0–0.2)
BASOPHILS RELATIVE PERCENT: 0.2 %
BENZODIAZEPINE SCREEN, URINE: ABNORMAL
BILIRUB SERPL-MCNC: 0.6 MG/DL (ref 0.2–0.7)
BILIRUBIN URINE: NEGATIVE
BLOOD, URINE: ABNORMAL
BUN BLDV-MCNC: 11 MG/DL (ref 6–20)
CALCIUM SERPL-MCNC: 9.5 MG/DL (ref 8.5–9.9)
CANNABINOID SCREEN URINE: ABNORMAL
CHLORIDE BLD-SCNC: 98 MEQ/L (ref 95–107)
CLARITY: CLEAR
CO2: 25 MEQ/L (ref 20–31)
COCAINE METABOLITE SCREEN URINE: POSITIVE
COLOR: YELLOW
CREAT SERPL-MCNC: 0.97 MG/DL (ref 0.5–0.9)
EOSINOPHILS ABSOLUTE: 0.1 K/UL (ref 0–0.7)
EOSINOPHILS RELATIVE PERCENT: 0.8 %
EPITHELIAL CELLS, UA: ABNORMAL /HPF (ref 0–5)
GFR AFRICAN AMERICAN: >60
GFR NON-AFRICAN AMERICAN: >60
GLOBULIN: 3.5 G/DL (ref 2.3–3.5)
GLUCOSE BLD-MCNC: 100 MG/DL (ref 70–99)
GLUCOSE URINE: NEGATIVE MG/DL
HCT VFR BLD CALC: 51.6 % (ref 37–47)
HEMOGLOBIN: 17.3 G/DL (ref 12–16)
HYALINE CASTS: ABNORMAL /HPF (ref 0–5)
KETONES, URINE: ABNORMAL MG/DL
LEUKOCYTE ESTERASE, URINE: NEGATIVE
LIPASE: 23 U/L (ref 12–95)
LYMPHOCYTES ABSOLUTE: 0.9 K/UL (ref 1–4.8)
LYMPHOCYTES RELATIVE PERCENT: 5.1 %
Lab: ABNORMAL
MCH RBC QN AUTO: 30.7 PG (ref 27–31.3)
MCHC RBC AUTO-ENTMCNC: 33.5 % (ref 33–37)
MCV RBC AUTO: 91.7 FL (ref 82–100)
METHADONE SCREEN, URINE: ABNORMAL
MONOCYTES ABSOLUTE: 0.5 K/UL (ref 0.2–0.8)
MONOCYTES RELATIVE PERCENT: 3.3 %
NEUTROPHILS ABSOLUTE: 15 K/UL (ref 1.4–6.5)
NEUTROPHILS RELATIVE PERCENT: 90.6 %
NITRITE, URINE: NEGATIVE
OPIATE SCREEN URINE: ABNORMAL
OXYCODONE URINE: ABNORMAL
PDW BLD-RTO: 13.6 % (ref 11.5–14.5)
PH UA: 6 (ref 5–9)
PHENCYCLIDINE SCREEN URINE: ABNORMAL
PLATELET # BLD: 378 K/UL (ref 130–400)
POTASSIUM SERPL-SCNC: 4 MEQ/L (ref 3.4–4.9)
PROPOXYPHENE SCREEN: ABNORMAL
PROTEIN UA: ABNORMAL MG/DL
RBC # BLD: 5.63 M/UL (ref 4.2–5.4)
RBC UA: ABNORMAL /HPF (ref 0–5)
SODIUM BLD-SCNC: 139 MEQ/L (ref 135–144)
SPECIFIC GRAVITY UA: 1.07 (ref 1–1.03)
TOTAL PROTEIN: 7.8 G/DL (ref 6.3–8)
URINE REFLEX TO CULTURE: YES
UROBILINOGEN, URINE: 0.2 E.U./DL
WBC # BLD: 16.6 K/UL (ref 4.8–10.8)
WBC UA: ABNORMAL /HPF (ref 0–5)

## 2020-03-20 PROCEDURE — 99283 EMERGENCY DEPT VISIT LOW MDM: CPT

## 2020-03-20 PROCEDURE — 2580000003 HC RX 258: Performed by: NURSE PRACTITIONER

## 2020-03-20 PROCEDURE — 74177 CT ABD & PELVIS W/CONTRAST: CPT

## 2020-03-20 PROCEDURE — 85025 COMPLETE CBC W/AUTO DIFF WBC: CPT

## 2020-03-20 PROCEDURE — 6360000004 HC RX CONTRAST MEDICATION: Performed by: NURSE PRACTITIONER

## 2020-03-20 PROCEDURE — 80053 COMPREHEN METABOLIC PANEL: CPT

## 2020-03-20 PROCEDURE — 96374 THER/PROPH/DIAG INJ IV PUSH: CPT

## 2020-03-20 PROCEDURE — 87086 URINE CULTURE/COLONY COUNT: CPT

## 2020-03-20 PROCEDURE — 80307 DRUG TEST PRSMV CHEM ANLYZR: CPT

## 2020-03-20 PROCEDURE — 6360000002 HC RX W HCPCS: Performed by: NURSE PRACTITIONER

## 2020-03-20 PROCEDURE — 83690 ASSAY OF LIPASE: CPT

## 2020-03-20 PROCEDURE — 81001 URINALYSIS AUTO W/SCOPE: CPT

## 2020-03-20 PROCEDURE — 36415 COLL VENOUS BLD VENIPUNCTURE: CPT

## 2020-03-20 RX ORDER — 0.9 % SODIUM CHLORIDE 0.9 %
1000 INTRAVENOUS SOLUTION INTRAVENOUS ONCE
Status: COMPLETED | OUTPATIENT
Start: 2020-03-20 | End: 2020-03-20

## 2020-03-20 RX ORDER — ONDANSETRON 4 MG/1
4-8 TABLET, ORALLY DISINTEGRATING ORAL EVERY 12 HOURS PRN
Qty: 12 TABLET | Refills: 0 | Status: SHIPPED | OUTPATIENT
Start: 2020-03-20

## 2020-03-20 RX ORDER — ONDANSETRON 2 MG/ML
4 INJECTION INTRAMUSCULAR; INTRAVENOUS EVERY 10 MIN PRN
Status: DISCONTINUED | OUTPATIENT
Start: 2020-03-20 | End: 2020-03-20 | Stop reason: HOSPADM

## 2020-03-20 RX ADMIN — IOPAMIDOL 100 ML: 755 INJECTION, SOLUTION INTRAVENOUS at 15:16

## 2020-03-20 RX ADMIN — SODIUM CHLORIDE 1000 ML: 9 INJECTION, SOLUTION INTRAVENOUS at 13:38

## 2020-03-20 RX ADMIN — ONDANSETRON 4 MG: 2 INJECTION INTRAMUSCULAR; INTRAVENOUS at 13:38

## 2020-03-20 ASSESSMENT — ENCOUNTER SYMPTOMS
SORE THROAT: 0
EYE PAIN: 0
RHINORRHEA: 0
COUGH: 0
ABDOMINAL PAIN: 1
PHOTOPHOBIA: 0
DIARRHEA: 0
BACK PAIN: 0
VOMITING: 1
SHORTNESS OF BREATH: 0
NAUSEA: 1

## 2020-03-20 NOTE — ED PROVIDER NOTES
[address] [facility] ED  EMERGENCY DEPARTMENT ENCOUNTER      Pt Name: Jamey Murrell  MRN: [id number]  [place] 1975  Date of evaluation: 3/20/2020  Provider: PELON Palencia - CNP    CHIEF COMPLAINT       Chief Complaint   Patient presents with    Emesis     Times two hours. HISTORY OF PRESENT ILLNESS   (Location/Symptom, Timing/Onset,Context/Setting, Quality, Duration, Modifying Factors, Severity)  Note limiting factors. Jamey Murrell is a 39 y.o. female who presents to the emergency department with nv for the past 2 hours. Fears food poisoning. abd cramping with emesis. No diarrhea. No fever. Location/Symptom - nv  Onset - 2 hours ago  Context/Setting - as above  Quality - nausa  Duration - constant  Modifying Factors - none  Severity - mild        Nursing Notes were reviewed. REVIEW OF SYSTEMS    (2-9 systems for level 4, 10 or more for level 5)     Review of Systems   Constitutional: Negative for chills, diaphoresis, fatigue and fever. HENT: Negative for congestion, rhinorrhea and sore throat. Eyes: Negative for photophobia and pain. Respiratory: Negative for cough and shortness of breath. Cardiovascular: Negative for chest pain and palpitations. Gastrointestinal: Positive for abdominal pain, nausea and vomiting. Negative for diarrhea. Genitourinary: Negative for dysuria and flank pain. Musculoskeletal: Negative for back pain. Skin: Negative for rash. Neurological: Negative for dizziness, light-headedness and headaches. Psychiatric/Behavioral: Negative. All other systems reviewed and are negative. Except as noted above the remainder of the review of systems was reviewed and negative.        PAST MEDICAL HISTORY     Past Medical History:   Diagnosis Date    Hypertension     Thyroid disease      Past Surgical History:   Procedure Laterality Date    CHOLECYSTECTOMY      TUBAL LIGATION       Social History     Socioeconomic History    Marital status: Single     Spouse name: None    Number of children: None    Years of education: None    Highest education level: None   Occupational History    None   Social Needs    Financial resource strain: None    Food insecurity     Worry: None     Inability: None    Transportation needs     Medical: None     Non-medical: None   Tobacco Use    Smoking status: Current Every Day Smoker     Packs/day: 1.00     Years: 23.00     Pack years: 23.00    Smokeless tobacco: Never Used   Substance and Sexual Activity    Alcohol use: No     Comment: socially    Drug use: No    Sexual activity: Not Currently   Lifestyle    Physical activity     Days per week: None     Minutes per session: None    Stress: None   Relationships    Social connections     Talks on phone: None     Gets together: None     Attends Jew service: None     Active member of club or organization: None     Attends meetings of clubs or organizations: None     Relationship status: None    Intimate partner violence     Fear of current or ex partner: None     Emotionally abused: None     Physically abused: None     Forced sexual activity: None   Other Topics Concern    None   Social History Narrative    None       SCREENINGS             PHYSICAL EXAM    (up to 7 for level 4, 8 or more for level 5)     ED Triage Vitals [03/20/20 1315]   BP Temp Temp Source Pulse Resp SpO2 Height Weight   113/80 98 °F (36.7 °C) Oral 120 18 99 % 5' 3\" (1.6 m) 160 lb (72.6 kg)       Physical Exam  Vitals signs and nursing note reviewed. Constitutional:       General: She is not in acute distress. Appearance: Normal appearance. She is well-developed. She is not diaphoretic. HENT:      Head: Normocephalic and atraumatic. Eyes:      General: Lids are normal.      Conjunctiva/sclera: Conjunctivae normal.   Neck:      Musculoskeletal: Normal range of motion and neck supple. Cardiovascular:      Rate and Rhythm: Normal rate and regular rhythm.       Pulses: components:    Ketones, Urine TRACE (*)     Blood, Urine MODERATE (*)     Protein, UA TRACE (*)     All other components within normal limits   URINE DRUG SCREEN - Abnormal; Notable for the following components:    Cocaine Metabolite Screen, Urine POSITIVE (*)     All other components within normal limits   MICROSCOPIC URINALYSIS - Abnormal; Notable for the following components:    WBC, UA 6-10 (*)     RBC, UA 20-50 (*)     All other components within normal limits   CULTURE, URINE   LIPASE       All other labs were within normal range or not returned as of this dictation. EMERGENCY DEPARTMENT COURSE and DIFFERENTIAL DIAGNOSIS/MDM:   Vitals:    Vitals:    03/20/20 1315 03/20/20 1400 03/20/20 1451   BP: 113/80 109/68 114/72   Pulse: 120 96 98   Resp: 18 20 18   Temp: 98 °F (36.7 °C)     TempSrc: Oral     SpO2: 99% 95% 98%   Weight: 160 lb (72.6 kg)     Height: 5' 3\" (1.6 m)              MDM toxic in no distress. She reports nausea but is not vomiting. Somewhat agitated. Laboratory studies were ordered for evaluation of acute pathology. Laboratory studies reviewed. She does have 60,600 white count. Abdomen is reexamined and remains soft nontender however given elevated white blood cell count will obtain CT scan of the abdomen to further evaluate. CT scan is negative. Patient has not had any vomiting here in the ED. She has tolerated 4 cans of soda as well as solid foods. Have provided extended discharge instructions to the patient including signs, symptoms and red flags of which to return to the emergency department. they express good understanding of these instructions. Standard anticipatory guidance given to patient upon discharge. Have given them a specific time frame in which to follow-up and who to follow-up with. I have also advised them that they should return to the emergency department if they get worse, or not getting better or develop any new or concerning symptoms.  Patient demonstrates

## 2020-03-22 LAB — URINE CULTURE, ROUTINE: NORMAL

## 2020-05-29 ENCOUNTER — HOSPITAL ENCOUNTER (EMERGENCY)
Age: 45
Discharge: HOME | End: 2020-05-29
Attending: EMERGENCY MEDICINE
Payer: COMMERCIAL

## 2020-05-29 VITALS
RESPIRATION RATE: 18 BRPM | TEMPERATURE: 98.8 F | SYSTOLIC BLOOD PRESSURE: 115 MMHG | WEIGHT: 158 LBS | DIASTOLIC BLOOD PRESSURE: 90 MMHG | HEIGHT: 63 IN | BODY MASS INDEX: 28 KG/M2 | HEART RATE: 91 BPM | OXYGEN SATURATION: 95 %

## 2020-05-29 LAB
ALBUMIN SERPL-MCNC: 4.3 G/DL (ref 3.5–4.6)
ALP BLD-CCNC: 78 U/L (ref 40–130)
ALT SERPL-CCNC: 23 U/L (ref 0–33)
ANION GAP SERPL CALCULATED.3IONS-SCNC: 11 MEQ/L (ref 9–15)
AST SERPL-CCNC: 21 U/L (ref 0–35)
BACTERIA: NEGATIVE /HPF
BASOPHILS ABSOLUTE: 0.1 K/UL (ref 0–0.2)
BASOPHILS RELATIVE PERCENT: 0.9 %
BILIRUB SERPL-MCNC: <0.2 MG/DL (ref 0.2–0.7)
BILIRUBIN URINE: NEGATIVE
BLOOD, URINE: ABNORMAL
BUN BLDV-MCNC: 12 MG/DL (ref 6–20)
CALCIUM SERPL-MCNC: 9.8 MG/DL (ref 8.5–9.9)
CHLORIDE BLD-SCNC: 101 MEQ/L (ref 95–107)
CLARITY: ABNORMAL
CO2: 29 MEQ/L (ref 20–31)
COLOR: YELLOW
CREAT SERPL-MCNC: 0.57 MG/DL (ref 0.5–0.9)
EOSINOPHILS ABSOLUTE: 0.2 K/UL (ref 0–0.7)
EOSINOPHILS RELATIVE PERCENT: 1.7 %
EPITHELIAL CELLS, UA: ABNORMAL /HPF (ref 0–5)
GFR AFRICAN AMERICAN: >60
GFR NON-AFRICAN AMERICAN: >60
GLOBULIN: 3.1 G/DL (ref 2.3–3.5)
GLUCOSE BLD-MCNC: 79 MG/DL (ref 70–99)
GLUCOSE URINE: NEGATIVE MG/DL
HCT VFR BLD CALC: 48.1 % (ref 37–47)
HEMOGLOBIN: 16.2 G/DL (ref 12–16)
KETONES, URINE: ABNORMAL MG/DL
LEUKOCYTE ESTERASE, URINE: ABNORMAL
LIPASE: 38 U/L (ref 12–95)
LYMPHOCYTES ABSOLUTE: 2.6 K/UL (ref 1–4.8)
LYMPHOCYTES RELATIVE PERCENT: 19.8 %
MCH RBC QN AUTO: 30.7 PG (ref 27–31.3)
MCHC RBC AUTO-ENTMCNC: 33.7 % (ref 33–37)
MCV RBC AUTO: 91 FL (ref 82–100)
MONOCYTES ABSOLUTE: 0.9 K/UL (ref 0.2–0.8)
MONOCYTES RELATIVE PERCENT: 6.6 %
NEUTROPHILS ABSOLUTE: 9.2 K/UL (ref 1.4–6.5)
NEUTROPHILS RELATIVE PERCENT: 71 %
NITRITE, URINE: NEGATIVE
PDW BLD-RTO: 13.5 % (ref 11.5–14.5)
PH UA: 5 (ref 5–9)
PLATELET # BLD: 360 K/UL (ref 130–400)
POTASSIUM SERPL-SCNC: 4.1 MEQ/L (ref 3.4–4.9)
PROTEIN UA: 30 MG/DL
RBC # BLD: 5.28 M/UL (ref 4.2–5.4)
RBC UA: >100 /HPF (ref 0–5)
SODIUM BLD-SCNC: 141 MEQ/L (ref 135–144)
SPECIFIC GRAVITY UA: 1.02 (ref 1–1.03)
TOTAL PROTEIN: 7.4 G/DL (ref 6.3–8)
URINE REFLEX TO CULTURE: YES
UROBILINOGEN, URINE: 0.2 E.U./DL
WBC # BLD: 13 K/UL (ref 4.8–10.8)
WBC UA: >100 /HPF (ref 0–5)

## 2020-05-29 PROCEDURE — 96375 TX/PRO/DX INJ NEW DRUG ADDON: CPT

## 2020-05-29 PROCEDURE — 85025 COMPLETE CBC W/AUTO DIFF WBC: CPT

## 2020-05-29 PROCEDURE — 99283 EMERGENCY DEPT VISIT LOW MDM: CPT

## 2020-05-29 PROCEDURE — 83690 ASSAY OF LIPASE: CPT

## 2020-05-29 PROCEDURE — 81001 URINALYSIS AUTO W/SCOPE: CPT

## 2020-05-29 PROCEDURE — 36415 COLL VENOUS BLD VENIPUNCTURE: CPT

## 2020-05-29 PROCEDURE — 80053 COMPREHEN METABOLIC PANEL: CPT

## 2020-05-29 PROCEDURE — 2580000003 HC RX 258: Performed by: NURSE PRACTITIONER

## 2020-05-29 PROCEDURE — 6360000002 HC RX W HCPCS: Performed by: NURSE PRACTITIONER

## 2020-05-29 PROCEDURE — 96365 THER/PROPH/DIAG IV INF INIT: CPT

## 2020-05-29 RX ORDER — KETOROLAC TROMETHAMINE 30 MG/ML
30 INJECTION, SOLUTION INTRAMUSCULAR; INTRAVENOUS ONCE
Status: COMPLETED | OUTPATIENT
Start: 2020-05-29 | End: 2020-05-29

## 2020-05-29 RX ORDER — HYDROCODONE BITARTRATE AND ACETAMINOPHEN 5; 325 MG/1; MG/1
1 TABLET ORAL EVERY 6 HOURS PRN
Qty: 10 TABLET | Refills: 0 | Status: SHIPPED | OUTPATIENT
Start: 2020-05-29 | End: 2020-06-03

## 2020-05-29 RX ORDER — 0.9 % SODIUM CHLORIDE 0.9 %
1000 INTRAVENOUS SOLUTION INTRAVENOUS ONCE
Status: COMPLETED | OUTPATIENT
Start: 2020-05-29 | End: 2020-05-29

## 2020-05-29 RX ORDER — ONDANSETRON 2 MG/ML
4 INJECTION INTRAMUSCULAR; INTRAVENOUS EVERY 10 MIN PRN
Status: DISCONTINUED | OUTPATIENT
Start: 2020-05-29 | End: 2020-05-29 | Stop reason: HOSPADM

## 2020-05-29 RX ORDER — SULFAMETHOXAZOLE AND TRIMETHOPRIM 800; 160 MG/1; MG/1
1 TABLET ORAL 2 TIMES DAILY
Qty: 14 TABLET | Refills: 0 | Status: SHIPPED | OUTPATIENT
Start: 2020-05-29 | End: 2021-01-22 | Stop reason: SDUPTHER

## 2020-05-29 RX ORDER — MORPHINE SULFATE 2 MG/ML
4 INJECTION, SOLUTION INTRAMUSCULAR; INTRAVENOUS EVERY 30 MIN PRN
Status: DISCONTINUED | OUTPATIENT
Start: 2020-05-29 | End: 2020-05-29 | Stop reason: HOSPADM

## 2020-05-29 RX ADMIN — KETOROLAC TROMETHAMINE 30 MG: 30 INJECTION, SOLUTION INTRAMUSCULAR at 15:10

## 2020-05-29 RX ADMIN — CEFTRIAXONE SODIUM 1 G: 1 INJECTION, POWDER, FOR SOLUTION INTRAMUSCULAR; INTRAVENOUS at 15:44

## 2020-05-29 RX ADMIN — MORPHINE SULFATE 4 MG: 2 INJECTION, SOLUTION INTRAMUSCULAR; INTRAVENOUS at 15:10

## 2020-05-29 RX ADMIN — SODIUM CHLORIDE 1000 ML: 9 INJECTION, SOLUTION INTRAVENOUS at 15:10

## 2020-05-29 RX ADMIN — ONDANSETRON 4 MG: 2 INJECTION INTRAMUSCULAR; INTRAVENOUS at 15:10

## 2020-05-29 ASSESSMENT — ENCOUNTER SYMPTOMS
SHORTNESS OF BREATH: 0
COUGH: 0
EYE PAIN: 0
NAUSEA: 0
BACK PAIN: 0
DIARRHEA: 0
SORE THROAT: 0
PHOTOPHOBIA: 0
RHINORRHEA: 0
ABDOMINAL PAIN: 0
VOMITING: 0

## 2020-05-29 ASSESSMENT — PAIN DESCRIPTION - LOCATION: LOCATION: FLANK

## 2020-05-29 ASSESSMENT — PAIN DESCRIPTION - ORIENTATION: ORIENTATION: RIGHT

## 2020-05-29 ASSESSMENT — PAIN SCALES - GENERAL
PAINLEVEL_OUTOF10: 6
PAINLEVEL_OUTOF10: 6

## 2020-05-29 ASSESSMENT — PAIN DESCRIPTION - PAIN TYPE: TYPE: ACUTE PAIN

## 2020-05-29 NOTE — ED TRIAGE NOTES
Pt here with complaints of urinary tract symptoms. She states there is blood in her urine. She reports suprapubic and flank tenderness. She describes suprapubic pressure. Denies any burning with urination or discharge.

## 2020-05-29 NOTE — ED PROVIDER NOTES
Neck:      Musculoskeletal: Normal range of motion and neck supple. Cardiovascular:      Rate and Rhythm: Normal rate and regular rhythm. Pulses: Normal pulses. Heart sounds: Normal heart sounds. Pulmonary:      Effort: Pulmonary effort is normal.      Breath sounds: Normal breath sounds. Abdominal:      General: Bowel sounds are normal.      Palpations: Abdomen is soft. Tenderness: There is no abdominal tenderness. There is right CVA tenderness (mild). Lymphadenopathy:      Cervical: No cervical adenopathy. Skin:     General: Skin is warm and dry. Capillary Refill: Capillary refill takes less than 2 seconds. Findings: No rash. Neurological:      Mental Status: She is alert and oriented to person, place, and time. Psychiatric:         Thought Content:  Thought content normal.         Judgment: Judgment normal.         RESULTS     EKG: All EKG's are interpreted by the Emergency Department Physician who either signs or Co-signsthis chart in the absence of a cardiologist.        RADIOLOGY:   Yovana Sane such as CT, Ultrasound and MRI are read by the radiologist. Plain radiographic images are visualized and preliminarily interpreted by the emergency physician with the below findings:        Interpretation per the Radiologist below, if available at the time ofthis note:    No orders to display         ED BEDSIDE ULTRASOUND:   Performed by ED Physician - none    LABS:  Labs Reviewed   CBC WITH AUTO DIFFERENTIAL - Abnormal; Notable for the following components:       Result Value    WBC 13.0 (*)     Hemoglobin 16.2 (*)     Hematocrit 48.1 (*)     Neutrophils Absolute 9.2 (*)     Monocytes Absolute 0.9 (*)     All other components within normal limits   URINE RT REFLEX TO CULTURE - Abnormal; Notable for the following components:    Clarity, UA CLOUDY (*)     Ketones, Urine TRACE (*)     Blood, Urine LARGE (*)     Protein, UA 30 (*)     Leukocyte Esterase, Urine MODERATE (*) All other components within normal limits   MICROSCOPIC URINALYSIS - Abnormal; Notable for the following components:    WBC, UA >100 (*)     RBC, UA >100 (*)     All other components within normal limits   CULTURE, URINE   COMPREHENSIVE METABOLIC PANEL   LIPASE       All other labs were within normal range or not returned as of this dictation. EMERGENCY DEPARTMENT COURSE and DIFFERENTIAL DIAGNOSIS/MDM:   Vitals:    Vitals:    05/29/20 1432   BP: (!) 115/90   Pulse: 91   Resp: 18   Temp: 98.8 °F (37.1 °C)   TempSrc: Oral   SpO2: 95%   Weight: 158 lb (71.7 kg)   Height: 5' 3\" (1.6 m)            MDM reexamined. Feels much better. Reexamined. Still has no abd pain or tendeness and minimal flank tenderness. W/u demonstrates a mild pyelonephritis discussed plan of care. She does feel well enough for discharge home and does not want to be admitted. She is provided Rocephin IV here in the emergency department. She will be discharged home with Bactrim and urinary culture will be followed. Have provided extended discharge instructions to the patient including signs, symptoms and red flags of which to return to the emergency department. they express good understanding of these instructions. Standard anticipatory guidance given to patient upon discharge. Have given them a specific time frame in which to follow-up and who to follow-up with. I have also advised them that they should return to the emergency department if they get worse, or not getting better or develop any new or concerning symptoms. Patient demonstrates understanding and all questions were answered. CRITICAL CARE TIME       CONSULTS:  None    PROCEDURES:  Unless otherwise noted below, none     Procedures    FINAL IMPRESSION      1.  Acute pyelonephritis          DISPOSITION/PLAN   DISPOSITION Discharge - Pending Orders Complete 05/29/2020 03:51:45 PM      PATIENT REFERRED TO:  Naz Chambers MD  [address]

## 2020-10-21 ENCOUNTER — HOSPITAL ENCOUNTER (EMERGENCY)
Age: 45
Discharge: HOME | End: 2020-10-21
Payer: COMMERCIAL

## 2020-10-21 VITALS
RESPIRATION RATE: 14 BRPM | HEART RATE: 85 BPM | OXYGEN SATURATION: 99 % | TEMPERATURE: 97.8 F | SYSTOLIC BLOOD PRESSURE: 108 MMHG | DIASTOLIC BLOOD PRESSURE: 75 MMHG

## 2020-10-21 PROCEDURE — 99283 EMERGENCY DEPT VISIT LOW MDM: CPT

## 2020-10-21 RX ORDER — POLYMYXIN B SULFATE AND TRIMETHOPRIM 1; 10000 MG/ML; [USP'U]/ML
1 SOLUTION OPHTHALMIC EVERY 4 HOURS
Qty: 1 BOTTLE | Refills: 0 | Status: SHIPPED | OUTPATIENT
Start: 2020-10-21 | End: 2020-10-28

## 2020-10-21 RX ORDER — POLYMYXIN B SULFATE AND TRIMETHOPRIM 1; 10000 MG/ML; [USP'U]/ML
1 SOLUTION OPHTHALMIC
Status: DISCONTINUED | OUTPATIENT
Start: 2020-10-21 | End: 2020-10-21 | Stop reason: HOSPADM

## 2020-10-21 RX ORDER — LORATADINE 10 MG/1
10 TABLET ORAL DAILY
Qty: 10 TABLET | Refills: 0 | Status: SHIPPED | OUTPATIENT
Start: 2020-10-21 | End: 2020-10-31

## 2020-10-21 ASSESSMENT — PAIN SCALES - GENERAL: PAINLEVEL_OUTOF10: 5

## 2020-10-21 ASSESSMENT — PAIN DESCRIPTION - PAIN TYPE: TYPE: ACUTE PAIN

## 2020-10-21 ASSESSMENT — ENCOUNTER SYMPTOMS
SORE THROAT: 0
SINUS PRESSURE: 0
SHORTNESS OF BREATH: 0
EYE PAIN: 0
EYE DISCHARGE: 1
ABDOMINAL PAIN: 0
COUGH: 0
SINUS PAIN: 0
NAUSEA: 0
PHOTOPHOBIA: 0
EYE REDNESS: 1
RHINORRHEA: 0
TROUBLE SWALLOWING: 0
VOMITING: 0
WHEEZING: 0
EYE ITCHING: 1

## 2020-10-21 ASSESSMENT — VISUAL ACUITY: OU: 1

## 2020-10-21 ASSESSMENT — PAIN DESCRIPTION - LOCATION: LOCATION: EYE

## 2020-10-21 ASSESSMENT — PAIN DESCRIPTION - ORIENTATION: ORIENTATION: RIGHT;LEFT

## 2020-10-21 NOTE — ED PROVIDER NOTES
[address] [facility] ED  EMERGENCY DEPARTMENT ENCOUNTER      Pt Name: Alicia Park  MRN: [id number]  [place] 1975  Date of evaluation: 10/21/2020  Provider: Carlo Cain Dr       Chief Complaint   Patient presents with    Eye Pain     c/o eye drainage and redness since yesterday          HISTORY OF PRESENT ILLNESS   (Location/Symptom, Timing/Onset, Context/Setting, Quality, Duration, Modifying Factors, Severity)  Note limiting factors. Alicia Park is a 39 y.o. female who per chart review has pmhx of htn and hypothyroidism presents to the emergency department with gradual onset, constant, moderate, bilateral eye redness, pruritis, and clear/yellow thin drainage x 2 days. Pt states drainage worse in the morning. She also just got a cat. No sick contacts. No history of similar. Has not tried anything for her symptoms, denies aggravating and alleviating factors. She denies visual changes, pain with eye movement, fever chills, eye lid swelling, cough, rhinorrhea, ear pain, headache, dizziness, photophobia, foreign body sensation. HPI    Nursing Notes were reviewed. REVIEW OF SYSTEMS    (2-9 systems for level 4, 10 or more for level 5)     Review of Systems   Constitutional: Negative for chills and fever. HENT: Negative for congestion, ear discharge, ear pain, rhinorrhea, sinus pressure, sinus pain, sore throat, tinnitus and trouble swallowing. Eyes: Positive for discharge, redness and itching. Negative for photophobia, pain and visual disturbance. Respiratory: Negative for cough, shortness of breath and wheezing. Cardiovascular: Negative for chest pain and palpitations. Gastrointestinal: Negative for abdominal pain, nausea and vomiting. Genitourinary: Negative for dysuria, frequency and hematuria. Musculoskeletal: Negative for myalgias. Allergic/Immunologic: Negative for immunocompromised state.    Neurological: Negative for dizziness, weakness and headaches. All other systems reviewed and are negative. Except as noted above the remainder of the review of systems was reviewed and negative. PAST MEDICAL HISTORY     Past Medical History:   Diagnosis Date    Hypertension     Thyroid disease          SURGICAL HISTORY       Past Surgical History:   Procedure Laterality Date    CHOLECYSTECTOMY      TUBAL LIGATION           CURRENT MEDICATIONS       Previous Medications    ASPIRIN EC 81 MG EC TABLET    Take 81 mg by mouth daily    HYDROCHLOROTHIAZIDE (HYDRODIURIL) 12.5 MG TABLET    Take 12.5 mg by mouth daily    IBUPROFEN (ADVIL;MOTRIN) 800 MG TABLET    Take 800 mg by mouth every 6 hours as needed for Pain    LEVOTHYROXINE (SYNTHROID) 150 MCG TABLET    Take 1 tablet by mouth daily    NAPROXEN (NAPROSYN) 500 MG TABLET    Take 1 tablet by mouth 2 times daily for 20 doses    ONDANSETRON (ZOFRAN ODT) 4 MG DISINTEGRATING TABLET    Take 1-2 tablets by mouth every 12 hours as needed for Nausea May Sub regular tablet (non-ODT) if insurance does not cover ODT. SULFAMETHOXAZOLE-TRIMETHOPRIM (BACTRIM DS) 800-160 MG PER TABLET    Take 1 tablet by mouth 2 times daily       ALLERGIES     Patient has no known allergies. FAMILY HISTORY     No family history on file.        SOCIAL HISTORY       Social History     Socioeconomic History    Marital status: Single     Spouse name: Not on file    Number of children: Not on file    Years of education: Not on file    Highest education level: Not on file   Occupational History    Not on file   Social Needs    Financial resource strain: Not on file    Food insecurity     Worry: Not on file     Inability: Not on file    Transportation needs     Medical: Not on file     Non-medical: Not on file   Tobacco Use    Smoking status: Current Every Day Smoker     Packs/day: 1.00     Years: 23.00     Pack years: 23.00    Smokeless tobacco: Never Used   Substance and Sexual Activity    Alcohol use: No     Comment: conjunctivitis, most like allergic as pt just got a new cat prior to sx onset. She is non toxic appearing and stable for discharge. Visual acuity is intact. Given scripts for claritin and polytrim. F/u with pcp in 1 day, return to the ED for worsening sx. Given referral to [facility] for f/u if sx do not improve. Given warning signs for which she should return. Pt understands and agrees to plan, all questions answered. REASSESSMENT          CRITICAL CARE TIME   Total Critical Care time was 0 minutes, excluding separately reportable procedures. There was a high probability of clinically significant/life threatening deterioration in the patient's condition which required my urgent intervention. CONSULTS:  None    PROCEDURES:  Unless otherwise noted below, none     Procedures        FINAL IMPRESSION      1. Conjunctivitis of both eyes, unspecified conjunctivitis type          DISPOSITION/PLAN   DISPOSITION Decision To Discharge 10/21/2020 11:10:29 AM      PATIENT REFERRED TO:  Rhett Beaulieu MD  [address] [facility] [address]  [phone]    Schedule an appointment as soon as possible for a visit in 2 days      [facility]) ED  [address]  [phone]  Go to   As needed, If symptoms worsen    [facility]  [address]  Schedule an appointment as soon as possible for a visit   As needed, If symptoms worsen      DISCHARGE MEDICATIONS:  New Prescriptions    LORATADINE (CLARITIN) 10 MG TABLET    Take 1 tablet by mouth daily for 10 days     Controlled Substances Monitoring:     No flowsheet data found.     (Please note that portions of this note were completed with a voice recognition program.  Efforts were made to edit the dictations but occasionally words are mis-transcribed.)    Simba Jolley PA-C (electronically signed)             Simba Jolley PA-C  10/21/20 [id number]

## 2020-11-10 ENCOUNTER — HOSPITAL ENCOUNTER (EMERGENCY)
Age: 45
Discharge: HOME | End: 2020-11-10
Attending: EMERGENCY MEDICINE
Payer: COMMERCIAL

## 2020-11-10 ENCOUNTER — APPOINTMENT (OUTPATIENT)
Dept: CT IMAGING | Age: 45
End: 2020-11-10
Payer: COMMERCIAL

## 2020-11-10 VITALS
BODY MASS INDEX: 26.58 KG/M2 | WEIGHT: 150 LBS | OXYGEN SATURATION: 100 % | HEIGHT: 63 IN | TEMPERATURE: 97.9 F | HEART RATE: 73 BPM | SYSTOLIC BLOOD PRESSURE: 105 MMHG | DIASTOLIC BLOOD PRESSURE: 75 MMHG | RESPIRATION RATE: 16 BRPM

## 2020-11-10 LAB
ALBUMIN SERPL-MCNC: 3.9 G/DL (ref 3.5–4.6)
ALP BLD-CCNC: 61 U/L (ref 40–130)
ALT SERPL-CCNC: 15 U/L (ref 0–33)
ANION GAP SERPL CALCULATED.3IONS-SCNC: 13 MEQ/L (ref 9–15)
AST SERPL-CCNC: 28 U/L (ref 0–35)
BACTERIA: ABNORMAL /HPF
BASOPHILS ABSOLUTE: 0.1 K/UL (ref 0–0.2)
BASOPHILS RELATIVE PERCENT: 0.7 %
BILIRUB SERPL-MCNC: <0.2 MG/DL (ref 0.2–0.7)
BILIRUBIN URINE: ABNORMAL
BLOOD, URINE: ABNORMAL
BUN BLDV-MCNC: 13 MG/DL (ref 6–20)
CALCIUM SERPL-MCNC: 9.3 MG/DL (ref 8.5–9.9)
CHLORIDE BLD-SCNC: 102 MEQ/L (ref 95–107)
CLARITY: ABNORMAL
CO2: 23 MEQ/L (ref 20–31)
COLOR: ABNORMAL
CREAT SERPL-MCNC: 0.63 MG/DL (ref 0.5–0.9)
CRYSTALS, UA: ABNORMAL /HPF
EOSINOPHILS ABSOLUTE: 0.3 K/UL (ref 0–0.7)
EOSINOPHILS RELATIVE PERCENT: 2.1 %
EPITHELIAL CELLS, UA: >100 /HPF (ref 0–5)
GFR AFRICAN AMERICAN: >60
GFR NON-AFRICAN AMERICAN: >60
GLOBULIN: 2.8 G/DL (ref 2.3–3.5)
GLUCOSE BLD-MCNC: 100 MG/DL (ref 70–99)
GLUCOSE URINE: NEGATIVE MG/DL
HCT VFR BLD CALC: 43.2 % (ref 37–47)
HEMOGLOBIN: 14.5 G/DL (ref 12–16)
KETONES, URINE: ABNORMAL MG/DL
LEUKOCYTE ESTERASE, URINE: ABNORMAL
LYMPHOCYTES ABSOLUTE: 3.5 K/UL (ref 1–4.8)
LYMPHOCYTES RELATIVE PERCENT: 29.2 %
MCH RBC QN AUTO: 30.8 PG (ref 27–31.3)
MCHC RBC AUTO-ENTMCNC: 33.6 % (ref 33–37)
MCV RBC AUTO: 91.8 FL (ref 82–100)
MONOCYTES ABSOLUTE: 0.6 K/UL (ref 0.2–0.8)
MONOCYTES RELATIVE PERCENT: 5.3 %
NEUTROPHILS ABSOLUTE: 7.6 K/UL (ref 1.4–6.5)
NEUTROPHILS RELATIVE PERCENT: 62.7 %
NITRITE, URINE: POSITIVE
PDW BLD-RTO: 13.5 % (ref 11.5–14.5)
PH UA: 5.5 (ref 5–9)
PLATELET # BLD: 343 K/UL (ref 130–400)
POTASSIUM SERPL-SCNC: 5 MEQ/L (ref 3.4–4.9)
PROTEIN UA: 30 MG/DL
RBC # BLD: 4.71 M/UL (ref 4.2–5.4)
RBC UA: ABNORMAL /HPF (ref 0–5)
SODIUM BLD-SCNC: 138 MEQ/L (ref 135–144)
SPECIFIC GRAVITY UA: 1.03 (ref 1–1.03)
TOTAL PROTEIN: 6.7 G/DL (ref 6.3–8)
UROBILINOGEN, URINE: 1 E.U./DL
WBC # BLD: 12.1 K/UL (ref 4.8–10.8)
WBC UA: >100 /HPF (ref 0–5)

## 2020-11-10 PROCEDURE — 96375 TX/PRO/DX INJ NEW DRUG ADDON: CPT

## 2020-11-10 PROCEDURE — 2580000003 HC RX 258: Performed by: EMERGENCY MEDICINE

## 2020-11-10 PROCEDURE — 74176 CT ABD & PELVIS W/O CONTRAST: CPT

## 2020-11-10 PROCEDURE — 96365 THER/PROPH/DIAG IV INF INIT: CPT

## 2020-11-10 PROCEDURE — 96374 THER/PROPH/DIAG INJ IV PUSH: CPT

## 2020-11-10 PROCEDURE — 99284 EMERGENCY DEPT VISIT MOD MDM: CPT

## 2020-11-10 PROCEDURE — 96366 THER/PROPH/DIAG IV INF ADDON: CPT

## 2020-11-10 PROCEDURE — 80053 COMPREHEN METABOLIC PANEL: CPT

## 2020-11-10 PROCEDURE — 6370000000 HC RX 637 (ALT 250 FOR IP): Performed by: EMERGENCY MEDICINE

## 2020-11-10 PROCEDURE — 81001 URINALYSIS AUTO W/SCOPE: CPT

## 2020-11-10 PROCEDURE — 6360000002 HC RX W HCPCS: Performed by: EMERGENCY MEDICINE

## 2020-11-10 PROCEDURE — 36415 COLL VENOUS BLD VENIPUNCTURE: CPT

## 2020-11-10 PROCEDURE — 85025 COMPLETE CBC W/AUTO DIFF WBC: CPT

## 2020-11-10 RX ORDER — OXYCODONE HYDROCHLORIDE AND ACETAMINOPHEN 5; 325 MG/1; MG/1
1 TABLET ORAL EVERY 6 HOURS PRN
Qty: 12 TABLET | Refills: 0 | Status: SHIPPED | OUTPATIENT
Start: 2020-11-10 | End: 2020-11-13

## 2020-11-10 RX ORDER — MORPHINE SULFATE 2 MG/ML
4 INJECTION, SOLUTION INTRAMUSCULAR; INTRAVENOUS
Status: DISCONTINUED | OUTPATIENT
Start: 2020-11-10 | End: 2020-11-10 | Stop reason: HOSPADM

## 2020-11-10 RX ORDER — LEVOFLOXACIN 750 MG/1
750 TABLET ORAL DAILY
Qty: 14 TABLET | Refills: 0 | Status: SHIPPED | OUTPATIENT
Start: 2020-11-10 | End: 2020-11-24

## 2020-11-10 RX ORDER — LEVOTHYROXINE SODIUM 0.15 MG/1
150 TABLET ORAL ONCE
Status: COMPLETED | OUTPATIENT
Start: 2020-11-10 | End: 2020-11-10

## 2020-11-10 RX ORDER — LEVOFLOXACIN 5 MG/ML
750 INJECTION, SOLUTION INTRAVENOUS ONCE
Status: COMPLETED | OUTPATIENT
Start: 2020-11-10 | End: 2020-11-10

## 2020-11-10 RX ORDER — 0.9 % SODIUM CHLORIDE 0.9 %
1000 INTRAVENOUS SOLUTION INTRAVENOUS ONCE
Status: COMPLETED | OUTPATIENT
Start: 2020-11-10 | End: 2020-11-10

## 2020-11-10 RX ORDER — ONDANSETRON 2 MG/ML
4 INJECTION INTRAMUSCULAR; INTRAVENOUS ONCE
Status: COMPLETED | OUTPATIENT
Start: 2020-11-10 | End: 2020-11-10

## 2020-11-10 RX ORDER — KETOROLAC TROMETHAMINE 30 MG/ML
30 INJECTION, SOLUTION INTRAMUSCULAR; INTRAVENOUS ONCE
Status: COMPLETED | OUTPATIENT
Start: 2020-11-10 | End: 2020-11-10

## 2020-11-10 RX ORDER — LEVOTHYROXINE SODIUM 0.15 MG/1
150 TABLET ORAL DAILY
Qty: 30 TABLET | Refills: 0 | Status: SHIPPED | OUTPATIENT
Start: 2020-11-10 | End: 2021-01-22 | Stop reason: SDUPTHER

## 2020-11-10 RX ADMIN — SODIUM CHLORIDE 1000 ML: 9 INJECTION, SOLUTION INTRAVENOUS at 17:05

## 2020-11-10 RX ADMIN — LEVOTHYROXINE SODIUM 150 MCG: 150 TABLET ORAL at 18:03

## 2020-11-10 RX ADMIN — KETOROLAC TROMETHAMINE 30 MG: 30 INJECTION, SOLUTION INTRAMUSCULAR at 17:06

## 2020-11-10 RX ADMIN — MORPHINE SULFATE 4 MG: 2 INJECTION, SOLUTION INTRAMUSCULAR; INTRAVENOUS at 17:06

## 2020-11-10 RX ADMIN — ONDANSETRON 4 MG: 2 INJECTION INTRAMUSCULAR; INTRAVENOUS at 17:04

## 2020-11-10 RX ADMIN — LEVOFLOXACIN 750 MG: 5 INJECTION, SOLUTION INTRAVENOUS at 17:29

## 2020-11-10 ASSESSMENT — PAIN SCALES - GENERAL: PAINLEVEL_OUTOF10: 7

## 2020-11-10 ASSESSMENT — PAIN DESCRIPTION - LOCATION: LOCATION: BACK

## 2020-11-10 ASSESSMENT — ENCOUNTER SYMPTOMS
NAUSEA: 1
BACK PAIN: 0
COUGH: 0
ABDOMINAL PAIN: 0
VOMITING: 1
DIARRHEA: 0
SHORTNESS OF BREATH: 0
SORE THROAT: 0

## 2020-11-10 ASSESSMENT — PAIN DESCRIPTION - PAIN TYPE: TYPE: ACUTE PAIN

## 2020-11-10 NOTE — ED TRIAGE NOTES
Walk in for flank pain left side X3 days no other pain at this time.   VSS, assessment initiated, will continue to monitor

## 2020-11-10 NOTE — ED NOTES
Patient given Dc instructions education and scripts  Patient educated on proper use of medication  Patients IV ABX is still infusing  Will discharge and disontinue IV after ABX is infused      Jean Paul Dejesus, RN  11/10/20 9071

## 2020-11-10 NOTE — ED PROVIDER NOTES
[address] [facility] ED  eMERGENCYdEPARTMENT eNCOUnter      Pt Name: Nereida Pena  MRN: [id number]  [place] 1975  Date of evaluation: 11/10/2020  Flori Choudhary MD    CHIEF COMPLAINT           HPI  Nereida Pena is a 39 y.o. female per chart review has a h/o HTN, hypothyroidism on synthroid presents to the ED with medication refill, flank pain. Pt notes gradual onset, moderate, constant, aching, L flank pain x 3 days. +N/v.  Pt also notes she ran out of her synthroid 2 weeks ago. Pt denies fever, cp, sob, dysuria, diarrhea. ROS  Review of Systems   Constitutional: Negative for activity change, chills and fever. HENT: Negative for ear pain and sore throat. Eyes: Negative for visual disturbance. Respiratory: Negative for cough and shortness of breath. Cardiovascular: Negative for chest pain, palpitations and leg swelling. Gastrointestinal: Positive for nausea and vomiting. Negative for abdominal pain and diarrhea. Genitourinary: Positive for flank pain. Negative for dysuria. Musculoskeletal: Negative for back pain. Skin: Negative for rash. Neurological: Negative for dizziness and weakness. Except as noted above the remainder of the review of systems was reviewed and negative.        PAST MEDICAL HISTORY     Past Medical History:   Diagnosis Date    Hypertension     Thyroid disease          SURGICAL HISTORY       Past Surgical History:   Procedure Laterality Date    CHOLECYSTECTOMY      TUBAL LIGATION           CURRENTMEDICATIONS       Previous Medications    ASPIRIN EC 81 MG EC TABLET    Take 81 mg by mouth daily    HYDROCHLOROTHIAZIDE (HYDRODIURIL) 12.5 MG TABLET    Take 12.5 mg by mouth daily    IBUPROFEN (ADVIL;MOTRIN) 800 MG TABLET    Take 800 mg by mouth every 6 hours as needed for Pain    NAPROXEN (NAPROSYN) 500 MG TABLET    Take 1 tablet by mouth 2 times daily for 20 doses    ONDANSETRON (ZOFRAN ODT) 4 MG DISINTEGRATING TABLET    Take 1-2 tablets by mouth every 12 hours as needed for Nausea May Sub regular tablet (non-ODT) if insurance does not cover ODT. SULFAMETHOXAZOLE-TRIMETHOPRIM (BACTRIM DS) 800-160 MG PER TABLET    Take 1 tablet by mouth 2 times daily       ALLERGIES     Patient has no known allergies. FAMILY HISTORY     No family history on file.        SOCIAL HISTORY       Social History     Socioeconomic History    Marital status: Single     Spouse name: Not on file    Number of children: Not on file    Years of education: Not on file    Highest education level: Not on file   Occupational History    Not on file   Social Needs    Financial resource strain: Not on file    Food insecurity     Worry: Not on file     Inability: Not on file    Transportation needs     Medical: Not on file     Non-medical: Not on file   Tobacco Use    Smoking status: Current Every Day Smoker     Packs/day: 1.00     Years: 23.00     Pack years: 23.00    Smokeless tobacco: Never Used   Substance and Sexual Activity    Alcohol use: No     Comment: socially    Drug use: No    Sexual activity: Not Currently   Lifestyle    Physical activity     Days per week: Not on file     Minutes per session: Not on file    Stress: Not on file   Relationships    Social connections     Talks on phone: Not on file     Gets together: Not on file     Attends Muslim service: Not on file     Active member of club or organization: Not on file     Attends meetings of clubs or organizations: Not on file     Relationship status: Not on file    Intimate partner violence     Fear of current or ex partner: Not on file     Emotionally abused: Not on file     Physically abused: Not on file     Forced sexual activity: Not on file   Other Topics Concern    Not on file   Social History Narrative    Not on file         PHYSICAL EXAM       ED Triage Vitals [11/10/20 1545]   BP Temp Temp Source Pulse Resp SpO2 Height Weight   115/63 97.6 °F (36.4 °C) Oral 73 18 100 % 5' 3\" (1.6 m) 153 lb (69.4 kg)       Physical Exam  Vitals signs and nursing note reviewed. Constitutional:       Appearance: She is well-developed. HENT:      Head: Normocephalic. Right Ear: External ear normal.      Left Ear: External ear normal.   Eyes:      Conjunctiva/sclera: Conjunctivae normal.      Pupils: Pupils are equal, round, and reactive to light. Neck:      Musculoskeletal: Normal range of motion and neck supple. Cardiovascular:      Rate and Rhythm: Normal rate and regular rhythm. Heart sounds: Normal heart sounds. Pulmonary:      Effort: Pulmonary effort is normal.      Breath sounds: Normal breath sounds. Abdominal:      General: Bowel sounds are normal. There is no distension. Palpations: Abdomen is soft. Tenderness: There is no abdominal tenderness. Musculoskeletal: Normal range of motion. Skin:     General: Skin is warm and dry. Neurological:      Mental Status: She is alert and oriented to person, place, and time. Psychiatric:         Mood and Affect: Mood normal.           MDM  40 yo female presents to the ED with flank pain, n/v, medication refill. Pt is afebrile, hemodynamically stable. Pt given 1 L NS, IV morphine, IV zofran, IV toradol with moderate relief. Labs remarkable for WC 12.  UA shows UTI. Given flank pain, UTI, will treat pt for pyelo. CT AP shows no kidney stone however shows RML pneumonia. Pt smokes. Pt given IV levaquin to cover for pneumonia and pyelo. Pt educated about pyelo and pneumonia. Pt given prescription for percocet, levaquin. Pt also given medication refill for synthroid. Pt given pyelo and pneumonia warning signs and encouraged to f/u with pcp and endocrinology. Pt understands plan. FINAL IMPRESSION      1. Pyelonephritis    2. Pneumonia due to organism    3.  Encounter for medication refill          DISPOSITION/PLAN   DISPOSITION Decision To Discharge 11/10/2020 05:18:16 PM        DISCHARGE MEDICATIONS:  [unfilled]

## 2021-01-22 ENCOUNTER — HOSPITAL ENCOUNTER (EMERGENCY)
Age: 46
Discharge: HOME | End: 2021-01-22
Payer: COMMERCIAL

## 2021-01-22 VITALS
HEART RATE: 89 BPM | DIASTOLIC BLOOD PRESSURE: 84 MMHG | WEIGHT: 150 LBS | BODY MASS INDEX: 25.61 KG/M2 | SYSTOLIC BLOOD PRESSURE: 140 MMHG | HEIGHT: 64 IN | RESPIRATION RATE: 17 BRPM | TEMPERATURE: 97.3 F | OXYGEN SATURATION: 99 %

## 2021-01-22 DIAGNOSIS — L02.219 CELLULITIS AND ABSCESS OF TRUNK: Primary | ICD-10-CM

## 2021-01-22 DIAGNOSIS — R79.89 ELEVATED TSH: ICD-10-CM

## 2021-01-22 DIAGNOSIS — F19.90 DRUG USE DISORDER: ICD-10-CM

## 2021-01-22 DIAGNOSIS — L03.319 CELLULITIS AND ABSCESS OF TRUNK: Primary | ICD-10-CM

## 2021-01-22 LAB
AMPHETAMINE SCREEN, URINE: ABNORMAL
ANION GAP SERPL CALCULATED.3IONS-SCNC: 9 MEQ/L (ref 9–15)
BACTERIA: NEGATIVE /HPF
BARBITURATE SCREEN URINE: ABNORMAL
BASOPHILS ABSOLUTE: 0.1 K/UL (ref 0–0.2)
BASOPHILS RELATIVE PERCENT: 0.9 %
BENZODIAZEPINE SCREEN, URINE: ABNORMAL
BILIRUBIN URINE: NEGATIVE
BLOOD, URINE: ABNORMAL
BUN BLDV-MCNC: 12 MG/DL (ref 6–20)
CALCIUM SERPL-MCNC: 8.8 MG/DL (ref 8.5–9.9)
CANNABINOID SCREEN URINE: POSITIVE
CHLORIDE BLD-SCNC: 105 MEQ/L (ref 95–107)
CLARITY: CLEAR
CO2: 30 MEQ/L (ref 20–31)
COCAINE METABOLITE SCREEN URINE: POSITIVE
COLOR: YELLOW
CREAT SERPL-MCNC: 0.7 MG/DL (ref 0.5–0.9)
EOSINOPHILS ABSOLUTE: 0.2 K/UL (ref 0–0.7)
EOSINOPHILS RELATIVE PERCENT: 1.9 %
EPITHELIAL CELLS, UA: ABNORMAL /HPF (ref 0–5)
GFR AFRICAN AMERICAN: >60
GFR NON-AFRICAN AMERICAN: >60
GLUCOSE BLD-MCNC: 79 MG/DL (ref 70–99)
GLUCOSE URINE: NEGATIVE MG/DL
HCG(URINE) PREGNANCY TEST: NEGATIVE
HCT VFR BLD CALC: 44 % (ref 37–47)
HEMOGLOBIN: 14.5 G/DL (ref 12–16)
HYALINE CASTS: ABNORMAL /HPF (ref 0–5)
KETONES, URINE: ABNORMAL MG/DL
LEUKOCYTE ESTERASE, URINE: NEGATIVE
LYMPHOCYTES ABSOLUTE: 2.6 K/UL (ref 1–4.8)
LYMPHOCYTES RELATIVE PERCENT: 28.8 %
Lab: ABNORMAL
MCH RBC QN AUTO: 30.7 PG (ref 27–31.3)
MCHC RBC AUTO-ENTMCNC: 32.9 % (ref 33–37)
MCV RBC AUTO: 93.4 FL (ref 82–100)
METHADONE SCREEN, URINE: ABNORMAL
MONOCYTES ABSOLUTE: 0.5 K/UL (ref 0.2–0.8)
MONOCYTES RELATIVE PERCENT: 5.3 %
NEUTROPHILS ABSOLUTE: 5.8 K/UL (ref 1.4–6.5)
NEUTROPHILS RELATIVE PERCENT: 63.1 %
NITRITE, URINE: NEGATIVE
OPIATE SCREEN URINE: ABNORMAL
OXYCODONE URINE: ABNORMAL
PDW BLD-RTO: 13.9 % (ref 11.5–14.5)
PH UA: 6 (ref 5–9)
PHENCYCLIDINE SCREEN URINE: ABNORMAL
PLATELET # BLD: 373 K/UL (ref 130–400)
POTASSIUM SERPL-SCNC: 3.9 MEQ/L (ref 3.4–4.9)
PROPOXYPHENE SCREEN: ABNORMAL
PROTEIN UA: NEGATIVE MG/DL
RBC # BLD: 4.71 M/UL (ref 4.2–5.4)
RBC UA: ABNORMAL /HPF (ref 0–5)
SODIUM BLD-SCNC: 144 MEQ/L (ref 135–144)
SPECIFIC GRAVITY UA: 1.02 (ref 1–1.03)
T4 FREE: 0.19 NG/DL (ref 0.84–1.68)
TSH REFLEX: 356.7 UIU/ML (ref 0.44–3.86)
URINE REFLEX TO CULTURE: ABNORMAL
UROBILINOGEN, URINE: 0.2 E.U./DL
WBC # BLD: 9.1 K/UL (ref 4.8–10.8)
WBC UA: ABNORMAL /HPF (ref 0–5)

## 2021-01-22 PROCEDURE — U0003 INFECTIOUS AGENT DETECTION BY NUCLEIC ACID (DNA OR RNA); SEVERE ACUTE RESPIRATORY SYNDROME CORONAVIRUS 2 (SARS-COV-2) (CORONAVIRUS DISEASE [COVID-19]), AMPLIFIED PROBE TECHNIQUE, MAKING USE OF HIGH THROUGHPUT TECHNOLOGIES AS DESCRIBED BY CMS-2020-01-R: HCPCS

## 2021-01-22 PROCEDURE — 80048 BASIC METABOLIC PNL TOTAL CA: CPT

## 2021-01-22 PROCEDURE — 84703 CHORIONIC GONADOTROPIN ASSAY: CPT

## 2021-01-22 PROCEDURE — 99283 EMERGENCY DEPT VISIT LOW MDM: CPT

## 2021-01-22 PROCEDURE — 85025 COMPLETE CBC W/AUTO DIFF WBC: CPT

## 2021-01-22 PROCEDURE — 84439 ASSAY OF FREE THYROXINE: CPT

## 2021-01-22 PROCEDURE — 84443 ASSAY THYROID STIM HORMONE: CPT

## 2021-01-22 PROCEDURE — 80307 DRUG TEST PRSMV CHEM ANLYZR: CPT

## 2021-01-22 PROCEDURE — 81001 URINALYSIS AUTO W/SCOPE: CPT

## 2021-01-22 PROCEDURE — 36415 COLL VENOUS BLD VENIPUNCTURE: CPT

## 2021-01-22 PROCEDURE — U0002 COVID-19 LAB TEST NON-CDC: HCPCS

## 2021-01-22 PROCEDURE — 6370000000 HC RX 637 (ALT 250 FOR IP): Performed by: NURSE PRACTITIONER

## 2021-01-22 RX ORDER — LEVOTHYROXINE SODIUM 0.15 MG/1
150 TABLET ORAL ONCE
Status: COMPLETED | OUTPATIENT
Start: 2021-01-22 | End: 2021-01-22

## 2021-01-22 RX ORDER — SULFAMETHOXAZOLE AND TRIMETHOPRIM 800; 160 MG/1; MG/1
1 TABLET ORAL 2 TIMES DAILY
Qty: 20 TABLET | Refills: 0 | Status: SHIPPED | OUTPATIENT
Start: 2021-01-22 | End: 2021-02-01

## 2021-01-22 RX ORDER — LEVOTHYROXINE SODIUM 0.15 MG/1
150 TABLET ORAL DAILY
Qty: 14 TABLET | Refills: 0 | Status: SHIPPED | OUTPATIENT
Start: 2021-01-22 | End: 2021-03-31 | Stop reason: SDUPTHER

## 2021-01-22 RX ORDER — CEPHALEXIN 500 MG/1
500 CAPSULE ORAL 4 TIMES DAILY
Qty: 28 CAPSULE | Refills: 0 | Status: SHIPPED | OUTPATIENT
Start: 2021-01-22 | End: 2021-01-29

## 2021-01-22 RX ADMIN — LEVOTHYROXINE SODIUM 150 MCG: 0.15 TABLET ORAL at 17:40

## 2021-01-22 ASSESSMENT — ENCOUNTER SYMPTOMS
COUGH: 0
SINUS PRESSURE: 0
ABDOMINAL PAIN: 0
NAUSEA: 0
DIARRHEA: 0
CONSTIPATION: 0
WHEEZING: 0
BACK PAIN: 0
ABDOMINAL DISTENTION: 0
EYE DISCHARGE: 0
FACIAL SWELLING: 0
SHORTNESS OF BREATH: 0
CHEST TIGHTNESS: 0
EYE REDNESS: 0
SORE THROAT: 0

## 2021-01-22 ASSESSMENT — PAIN SCALES - GENERAL: PAINLEVEL_OUTOF10: 6

## 2021-01-22 ASSESSMENT — PAIN DESCRIPTION - ONSET: ONSET: ON-GOING

## 2021-01-22 ASSESSMENT — PAIN DESCRIPTION - LOCATION: LOCATION: FLANK

## 2021-01-22 ASSESSMENT — PAIN DESCRIPTION - PAIN TYPE: TYPE: ACUTE PAIN

## 2021-01-22 ASSESSMENT — PAIN DESCRIPTION - FREQUENCY: FREQUENCY: INTERMITTENT

## 2021-01-22 NOTE — ED PROVIDER NOTES
[address] [facility] ED  eMERGENCY dEPARTMENT eNCOUnter      Pt Name: Leslie Toribio  MRN: [id number]  [place] 1975  Date of evaluation: 1/22/2021  Provider: PELON Dodd - CNP      HISTORY OF PRESENT ILLNESS    Leslie Toribio is a 55 y.o. female who presents to the Emergency Department with MULTIPLE complaints. Pt would like a thyroid med refil- but she is not sure when she last saw her doctor or when her last labs were. She states she feel tired x2 days and emotional for a while - and everything makes her cry or laugh. She also states she has a lump x 2 that is painful- that has been there for a little bit nut it is not getting better and now she started with urgency and UTI symptoms. pt is a smoker and she does have a wet cough- but she states that's normal for her. She denies CP, SOB, just tireless and chills           REVIEW OF SYSTEMS       Review of Systems   Constitutional: Positive for fatigue. Negative for appetite change, chills, diaphoresis and fever. Aches and pain   tired all the time   HENT: Negative for congestion, dental problem, ear discharge, ear pain, facial swelling, mouth sores, postnasal drip, sinus pressure and sore throat. Eyes: Negative for discharge and redness. Respiratory: Negative for cough, chest tightness, shortness of breath and wheezing. Cardiovascular: Negative for chest pain, palpitations and leg swelling. Gastrointestinal: Negative for abdominal distention, abdominal pain, constipation, diarrhea and nausea. Endocrine: Negative for cold intolerance and heat intolerance. Genitourinary: Positive for dysuria, frequency and urgency. Negative for difficulty urinating, flank pain and pelvic pain. Musculoskeletal: Negative for arthralgias, back pain, gait problem and joint swelling. Skin: Positive for rash (lump x2- right lateral chest wall and pubic areas).    Neurological: Negative for dizziness, seizures, syncope, weakness, numbness and headaches. Psychiatric/Behavioral: Negative for agitation, behavioral problems, confusion and dysphoric mood. PAST MEDICAL HISTORY     Past Medical History:   Diagnosis Date    Hypertension     Thyroid disease          SURGICAL HISTORY       Past Surgical History:   Procedure Laterality Date    CHOLECYSTECTOMY      TUBAL LIGATION           CURRENT MEDICATIONS       Previous Medications    ASPIRIN EC 81 MG EC TABLET    Take 81 mg by mouth daily    HYDROCHLOROTHIAZIDE (HYDRODIURIL) 12.5 MG TABLET    Take 12.5 mg by mouth daily    IBUPROFEN (ADVIL;MOTRIN) 800 MG TABLET    Take 800 mg by mouth every 6 hours as needed for Pain    NAPROXEN (NAPROSYN) 500 MG TABLET    Take 1 tablet by mouth 2 times daily for 20 doses    ONDANSETRON (ZOFRAN ODT) 4 MG DISINTEGRATING TABLET    Take 1-2 tablets by mouth every 12 hours as needed for Nausea May Sub regular tablet (non-ODT) if insurance does not cover ODT. ALLERGIES     Patient has no known allergies. FAMILY HISTORY     History reviewed. No pertinent family history.        SOCIAL HISTORY       Social History     Socioeconomic History    Marital status: Single     Spouse name: None    Number of children: None    Years of education: None    Highest education level: None   Occupational History    None   Social Needs    Financial resource strain: None    Food insecurity     Worry: None     Inability: None    Transportation needs     Medical: None     Non-medical: None   Tobacco Use    Smoking status: Current Every Day Smoker     Packs/day: 1.00     Years: 23.00     Pack years: 23.00    Smokeless tobacco: Never Used   Substance and Sexual Activity    Alcohol use: No     Comment: socially    Drug use: No    Sexual activity: Not Currently   Lifestyle    Physical activity     Days per week: None     Minutes per session: None    Stress: None   Relationships    Social connections     Talks on phone: None     Gets together: None     Attends Jewish service: None     Active member of club or organization: None     Attends meetings of clubs or organizations: None     Relationship status: None    Intimate partner violence     Fear of current or ex partner: None     Emotionally abused: None     Physically abused: None     Forced sexual activity: None   Other Topics Concern    None   Social History Narrative    None       SCREENINGS      @FLOW([id number])@      PHYSICAL EXAM    (up to 7 for level 4, 8 or more for level 5)     ED Triage Vitals   BP Temp Temp Source Pulse Resp SpO2 Height Weight   01/22/21 1528 01/22/21 1527 01/22/21 1527 01/22/21 1527 01/22/21 1527 01/22/21 1527 01/22/21 1527 01/22/21 1527   (!) 140/84 97.3 °F (36.3 °C) Temporal 89 17 99 % 5' 4\" (1.626 m) 150 lb (68 kg)       Physical Exam  Constitutional:       General: She is not in acute distress. Appearance: She is not diaphoretic. HENT:      Head: Normocephalic and atraumatic. Eyes:      General: No scleral icterus. Conjunctiva/sclera: Conjunctivae normal.      Pupils: Pupils are equal, round, and reactive to light. Neck:      Musculoskeletal: Normal range of motion and neck supple. Thyroid: No thyromegaly. Trachea: No tracheal deviation. Cardiovascular:      Rate and Rhythm: Normal rate and regular rhythm. Heart sounds: Normal heart sounds. No murmur. No gallop. Pulmonary:      Effort: Pulmonary effort is normal.      Breath sounds: Normal breath sounds. No wheezing or rales. Abdominal:      General: Bowel sounds are normal. There is no distension. Palpations: Abdomen is soft. Tenderness: There is no abdominal tenderness. There is no guarding. Musculoskeletal: Normal range of motion. Lymphadenopathy:      Cervical: No cervical adenopathy. Skin:     General: Skin is warm and dry. Coloration: Skin is not pale. Findings: Abscess and erythema present. No rash.              Comments: Pt does have a cyst like capsule-with localized redness  Tender to touch- raised and soft in middle- but not fluctuant at this time   Neurological:      Mental Status: She is alert and oriented to person, place, and time. Cranial Nerves: No cranial nerve deficit. Coordination: Coordination normal.   Psychiatric:         Mood and Affect: Mood is anxious. Behavior: Behavior is agitated. All other labs were within normal range or not returned as of this dictation. EMERGENCY DEPARTMENT COURSE and DIFFERENTIALDIAGNOSIS/MDM:   Vitals:    Vitals:    01/22/21 1527 01/22/21 1528   BP:  (!) 140/84   Pulse: 89    Resp: 17    Temp: 97.3 °F (36.3 °C)    TempSrc: Temporal    SpO2: 99%    Weight: 150 lb (68 kg)    Height: 5' 4\" (1.626 m)             Pt is anxious and is a bit agitated when she does not hear what she wants or when I asked her about the hx and labs she had for her tyroid meds -   Called pt pharmacy [facility] on [address] and they state that they have nothing for her   pt is very upset  She said to try [facility] in [place] - which also has nothing in our system  They said they transferred pts rx year ago to [facility] that was not picked uup   pt had rx form ED- in January that she did not fill until march   and then she had an rx from this ER on november 2020   nothing form her PCP    Pt is cocaine and  THC positive on todays visit  Her urine is otherwise negative  HEr WBC count is 9.1  She does have a lump that looks like a cyst vs abscess- we plan to tx that with antibiotics- it may even need to be lanced if not improving    We did COVID swab her as she does continue to complain of chills and fatigue- but it may be drug use related   pt is long term non compliant- I spoke with her- she needs to follow up with exocrinology or PCP   her THS is over 100- NEED follow up with endocrinology we stressed that with her.  She does have to be on meds- we will send there with new rx- and it can be adjusted from there   her last one form 2019 was 511   not sure if she had any labs in between    I did speak with her about stopping drugs               PROCEDURES:  Unless otherwise noted below, none     Procedures  na    FINAL IMPRESSION      1. Cellulitis and abscess of trunk    2. Drug use disorder    3.  Elevated TSH          DISPOSITION/PLAN   DISPOSITION Decision To Discharge 01/22/2021 05:22:17 PELON Delaney CNP (electronically signed)  Attending Emergency Physician     PELON Cook CNP  01/22/21 8906

## 2021-01-22 NOTE — ED TRIAGE NOTES
Pt presents to ED from home with c/o dysuria, possible abscess, and need for medication refill. Pt states that she ran out of synthroid 1 week ago and needs prescription refilled. In addition, pt c/o dysuria x3 days and possible abscess/cyst to left upper back and pubic area. Pt denies fevers, or drainage from sites. Upon assessment, pt is A/Ox4, skin p/w/d, resp even and unlabored, msp's intact. Pt denies cp, sob, n/v/d, fever, or chills.

## 2021-01-23 ENCOUNTER — CARE COORDINATION (OUTPATIENT)
Dept: CARE COORDINATION | Age: 46
End: 2021-01-23

## 2021-01-23 LAB — SARS-COV-2, PCR: NOT DETECTED

## 2021-01-23 NOTE — CARE COORDINATION
Patient contacted regarding [facility]. Discussed COVID-19 related testing which was available at this time. Test results were negative. Patient informed of results, if available? Yes and Completed 2021    Care Transition Nurse/ Ambulatory Care Manager contacted the patient by telephone to perform post discharge assessment. Call within 2 business days of discharge: Yes. Verified name and  with patient as identifiers. Provided introduction to self, and explanation of the CTN/ACM role, and reason for call due to risk factors for infection and/or exposure to COVID-19. Symptoms reviewed with patient who verbalized the following symptoms: fatigue, nausea, no new symptoms and no worsening symptoms. Due to no new or worsening symptoms encounter was not routed to provider for escalation. Discussed follow-up appointments. If no appointment was previously scheduled, appointment scheduling offered: Yes  [facility] follow up appointment(s): No future appointments. Non-[facility] follow up appointment(s): Dr Trina Narayan services provided:  Scheduled appointment with PCP-She was provided a couple of names in ER and she will call to establish with a PCP in [facility] and reviewed discharge summary and/or continuity of care documents     Advance Care Planning:   Does patient have an Advance Directive:  reviewed and current. Patient has following risk factors of: no known risk factors. CTN/ACM reviewed discharge instructions, medical action plan and red flags such as increased shortness of breath, increasing fever and signs of decompensation with patient who verbalized understanding. Discussed exposure protocols and quarantine with CDC Guidelines What to do if you are sick with coronavirus disease .  Patient was given an opportunity for questions and concerns.  The patient agrees to contact the [facility] exposure line [phone], local health department [place] Department of Health: ([phone]) and PCP office for questions related to their healthcare. CTN/ACM provided contact information for future needs. Reviewed and educated patient on any new and changed medications related to discharge diagnosis     Patient/family/caregiver given information for [facility] [facility] and agrees to enroll no  Patient's preferred e-mail:   Patient's preferred phone number:   Based on Loop alert triggers, patient will be contacted by nurse care manager for worsening symptoms. No COVID related symptoms. Further follow up not required based on severity of symptoms and risk factors. Zac Chi tells me that she \"doesn't feel right. \" she states that she wants to sleep all the time and has nausea. I have made her aware that her COVID testing is negative  I offered follow up for her and she states that she is trying to get someone closer as she does not drive and her rides do not want to take her to [place]. She does have transportation with her insurance but she still would like to see someone in [facility]. She will follow up with the names provided to her in the ER.

## 2021-02-04 ENCOUNTER — CARE COORDINATION (OUTPATIENT)
Dept: CARE COORDINATION | Age: 46
End: 2021-02-04

## 2021-02-04 NOTE — CARE COORDINATION
You Patient resolved from the Care Transitions episode on 2/4/2021  Discussed COVID-19 related testing which was not done at this time. Test results were not done. Patient informed of results, if available? N/A    Patient/family has been provided the following resources and education related to COVID-19:                         Signs, symptoms and red flags related to COVID-19            CDC exposure and quarantine guidelines            [facility] exposure contact - [phone]            Contact for their local Department of Health                 Patient currently reports that the following symptoms have improved:  no new/worsening symptoms     No further outreach scheduled with this CTN/ACM. Episode of Care resolved. Patient has this CTN/ACM contact information if future needs arise.

## 2021-02-12 ENCOUNTER — CARE COORDINATION (OUTPATIENT)
Dept: CARE COORDINATION | Age: 46
End: 2021-02-12

## 2021-02-12 NOTE — CARE COORDINATION
Chart review completed for care coordination. Patient currently utilizing non [facility] PCP  Episode completed.

## 2021-03-31 ENCOUNTER — HOSPITAL ENCOUNTER (EMERGENCY)
Age: 46
Discharge: HOME | End: 2021-03-31
Attending: STUDENT IN AN ORGANIZED HEALTH CARE EDUCATION/TRAINING PROGRAM
Payer: COMMERCIAL

## 2021-03-31 VITALS
HEART RATE: 97 BPM | WEIGHT: 148 LBS | BODY MASS INDEX: 26.22 KG/M2 | RESPIRATION RATE: 17 BRPM | SYSTOLIC BLOOD PRESSURE: 140 MMHG | DIASTOLIC BLOOD PRESSURE: 105 MMHG | HEIGHT: 63 IN | TEMPERATURE: 97.2 F | OXYGEN SATURATION: 98 %

## 2021-03-31 DIAGNOSIS — Z13.9 ENCOUNTER FOR MEDICAL SCREENING EXAMINATION: ICD-10-CM

## 2021-03-31 DIAGNOSIS — I10 CHRONIC HYPERTENSION: ICD-10-CM

## 2021-03-31 DIAGNOSIS — Z76.0 ENCOUNTER FOR MEDICATION REFILL: Primary | ICD-10-CM

## 2021-03-31 DIAGNOSIS — F17.200 TOBACCO DEPENDENCE: ICD-10-CM

## 2021-03-31 LAB
GLUCOSE BLD-MCNC: 87 MG/DL (ref 60–115)
PERFORMED ON: NORMAL

## 2021-03-31 PROCEDURE — 99283 EMERGENCY DEPT VISIT LOW MDM: CPT

## 2021-03-31 RX ORDER — HYDROCHLOROTHIAZIDE 25 MG/1
25 TABLET ORAL DAILY
Qty: 30 TABLET | Refills: 0 | Status: SHIPPED | OUTPATIENT
Start: 2021-03-31 | End: 2021-06-16 | Stop reason: SDUPTHER

## 2021-03-31 RX ORDER — LEVOTHYROXINE SODIUM 0.15 MG/1
150 TABLET ORAL DAILY
Qty: 30 TABLET | Refills: 0 | Status: SHIPPED | OUTPATIENT
Start: 2021-03-31 | End: 2021-06-16 | Stop reason: SDUPTHER

## 2021-03-31 NOTE — ED TRIAGE NOTES
Pt was placed on levothyroxine 150mcg in January. Pt had 14 pills and was suppose to follow up with physician. Before pt was able to do so, pt became incarcerated for over 30 days and pt stated that they would not give her the medication. Pt missed her appt with endocrinologist so now has not been on it for quite some time.

## 2021-04-02 ASSESSMENT — ENCOUNTER SYMPTOMS
DIARRHEA: 0
VOMITING: 0
SHORTNESS OF BREATH: 0
ABDOMINAL PAIN: 0
BACK PAIN: 0
TROUBLE SWALLOWING: 0
COUGH: 0
SINUS PRESSURE: 0
CHEST TIGHTNESS: 0

## 2021-04-03 NOTE — ED PROVIDER NOTES
[address] [facility] ED  eMERGENCY dEPARTMENT eNCOUnter      Pt Name: Mariluz Wiseman  MRN: [id number]  [place] 1975  Date of evaluation: 3/31/2021  Provider: Dereje Jones [address]       Chief Complaint   Patient presents with    Medication Refill     need medication refill on her levothyroxine 150mcg         HISTORY OF PRESENT ILLNESS   (Location/Symptom, Timing/Onset,Context/Setting, Quality, Duration, Modifying Factors, Severity)  Note limiting factors. Mariluz Wiseman is a 55 y.o. female who presents to the emergency department with complaint that she needed her levothyroxine filled. Patient's blood pressure is running high and she admits that she stopped taking it after it ran out and she has been noncompliant with it. The patient also requested that she have her blood sugar checked which resulted as a number of 87. The ED attending explained to her that it was not diabetes range that she was concerned about. Patient denies any fever, chills, cough, nausea, vomiting or diarrhea. The history is provided by the patient. NursingNotes were reviewed. REVIEW OF SYSTEMS    (2-9 systems for level 4, 10 or more for level 5)     Review of Systems   Constitutional: Negative for activity change, appetite change, chills, fever and unexpected weight change. HENT: Negative for drooling, ear pain, nosebleeds, sinus pressure and trouble swallowing. Respiratory: Negative for cough, chest tightness and shortness of breath. Cardiovascular: Negative for chest pain and leg swelling. Gastrointestinal: Negative for abdominal pain, diarrhea and vomiting. Endocrine: Negative for polydipsia and polyphagia. Genitourinary: Negative for dysuria, flank pain and frequency. Musculoskeletal: Negative for back pain and myalgias. Skin: Negative for pallor and rash. Neurological: Negative for syncope, weakness and headaches. Hematological: Does not bruise/bleed easily.    All other systems reviewed and are negative. Except as noted above the remainder of the review of systems was reviewed and negative. PAST MEDICAL HISTORY     Past Medical History:   Diagnosis Date    Hypertension     Thyroid disease          SURGICALHISTORY       Past Surgical History:   Procedure Laterality Date    CHOLECYSTECTOMY      TUBAL LIGATION           CURRENT MEDICATIONS       Discharge Medication List as of 3/31/2021  4:38 PM      CONTINUE these medications which have NOT CHANGED    Details   ondansetron (ZOFRAN ODT) 4 MG disintegrating tablet Take 1-2 tablets by mouth every 12 hours as needed for Nausea May Sub regular tablet (non-ODT) if insurance does not cover ODT., Disp-12 tablet, R-0Print      naproxen (NAPROSYN) 500 MG tablet Take 1 tablet by mouth 2 times daily for 20 doses, Disp-20 tablet,R-0Print      aspirin EC 81 MG EC tablet Take 81 mg by mouth dailyHistorical Med             ALLERGIES     Patient has no known allergies. FAMILY HISTORY     History reviewed. No pertinent family history.        SOCIAL HISTORY       Social History     Socioeconomic History    Marital status: Single     Spouse name: None    Number of children: None    Years of education: None    Highest education level: None   Occupational History    None   Social Needs    Financial resource strain: None    Food insecurity     Worry: None     Inability: None    Transportation needs     Medical: None     Non-medical: None   Tobacco Use    Smoking status: Current Every Day Smoker     Packs/day: 1.00     Years: 23.00     Pack years: 23.00    Smokeless tobacco: Never Used   Substance and Sexual Activity    Alcohol use: No     Comment: socially    Drug use: No    Sexual activity: Not Currently   Lifestyle    Physical activity     Days per week: None     Minutes per session: None    Stress: None   Relationships    Social connections     Talks on phone: None     Gets together: None     Attends Rastafari service: None     Active member of club or organization: None     Attends meetings of clubs or organizations: None     Relationship status: None    Intimate partner violence     Fear of current or ex partner: None     Emotionally abused: None     Physically abused: None     Forced sexual activity: None   Other Topics Concern    None   Social History Narrative    None       SCREENINGS    [place] Coma Scale  Eye Opening: Spontaneous  Best Verbal Response: Oriented  Best Motor Response: Obeys commands  [place] Coma Scale Score: 15 @FLOW([id number])@      PHYSICAL EXAM    (up to 7 for level 4, 8 or more for level 5)     ED Triage Vitals [03/31/21 1436]   BP Temp Temp Source Pulse Resp SpO2 Height Weight   (!) 164/125 97.2 °F (36.2 °C) Temporal 97 17 98 % 5' 3\" (1.6 m) 148 lb (67.1 kg)       Physical Exam  Vitals signs and nursing note reviewed. Constitutional:       General: She is awake. She is not in acute distress. Appearance: Normal appearance. She is well-developed and normal weight. She is not ill-appearing, toxic-appearing or diaphoretic. Comments: No photophobia. No phonophobia. HENT:      Head: Normocephalic and atraumatic. No Lizama's sign. Right Ear: External ear normal.      Left Ear: External ear normal.      Nose: Nose normal. No congestion or rhinorrhea. Mouth/Throat:      Mouth: Mucous membranes are moist.      Pharynx: Oropharynx is clear. No oropharyngeal exudate or posterior oropharyngeal erythema. Eyes:      General: No scleral icterus. Right eye: No foreign body or discharge. Left eye: No discharge. Extraocular Movements: Extraocular movements intact. Conjunctiva/sclera: Conjunctivae normal.      Left eye: No exudate. Pupils: Pupils are equal, round, and reactive to light. Neck:      Musculoskeletal: Normal range of motion and neck supple. No neck rigidity. Vascular: No JVD. Trachea: No tracheal deviation.       Comments: No meningismus. Cardiovascular:      Rate and Rhythm: Normal rate and regular rhythm. Heart sounds: Normal heart sounds. Heart sounds not distant. No murmur. No friction rub. No gallop. Pulmonary:      Effort: Pulmonary effort is normal. No respiratory distress. Breath sounds: Normal breath sounds. No stridor. No wheezing, rhonchi or rales. Chest:      Chest wall: No tenderness. Abdominal:      General: Abdomen is flat. Bowel sounds are normal. There is no distension. Palpations: Abdomen is soft. There is no mass. Tenderness: There is no abdominal tenderness. There is no right CVA tenderness, left CVA tenderness, guarding or rebound. Hernia: No hernia is present. Musculoskeletal: Normal range of motion. General: No swelling, tenderness, deformity or signs of injury. Lymphadenopathy:      Head:      Right side of head: No submental adenopathy. Left side of head: No submental adenopathy. Skin:     General: Skin is warm and dry. Capillary Refill: Capillary refill takes less than 2 seconds. Coloration: Skin is not jaundiced or pale. Findings: No bruising, erythema, lesion or rash. Neurological:      General: No focal deficit present. Mental Status: She is alert and oriented to person, place, and time. Mental status is at baseline. Cranial Nerves: No cranial nerve deficit. Sensory: No sensory deficit. Motor: No weakness. Coordination: Coordination normal.      Deep Tendon Reflexes: Reflexes are normal and symmetric. Psychiatric:         Mood and Affect: Mood normal.         Behavior: Behavior normal. Behavior is cooperative. Thought Content:  Thought content normal.         Judgment: Judgment normal.         DIAGNOSTIC RESULTS     EKG: All EKG's are interpreted by the Emergency Department Physician who either signs or Co-signsthis chart in the absence of a cardiologist.        RADIOLOGY:   Non-plain filmimages such as CT, Ultrasound and MRI are read by the radiologist. Plain radiographic images are visualized and preliminarily interpreted by the emergency physician with the below findings:        Interpretation per the Radiologist below, if available at the time ofthis note:    No orders to display         ED BEDSIDE ULTRASOUND:   Performed by ED Physician - none    LABS:  Labs Reviewed   POC GLUCOSE FINGERSTICK   POCT GLUCOSE       All other labs were within normal range or not returned as of this dictation. EMERGENCY DEPARTMENT COURSE and DIFFERENTIAL DIAGNOSIS/MDM:   Vitals:    Vitals:    03/31/21 1436 03/31/21 1636   BP: (!) 164/125 (!) 140/105   Pulse: 97    Resp: 17    Temp: 97.2 °F (36.2 °C)    TempSrc: Temporal    SpO2: 98%    Weight: 148 lb (67.1 kg)    Height: 5' 3\" (1.6 m)            MDM  Patient states she had to go out of town was unable to see her family doctor. The ER physician refilled her medications instructed her on the risks of hypertension including stroke. I have spoken with the patient for greater than 3 minutes about smoking cessation. I have advised the cold turkey method for quitting. I discussed the risks of smoking such as infection, blood clots, poor healing, cancer, heart attack, strokes, and neuropathy. The findings were discussed with the patient. The patient was invited to return  to the ER if worse symptoms. The patient verbalized understanding of the care and they have no further questions. CONSULTS:  None    PROCEDURES:  Unless otherwise noted below, none     Procedures    FINAL IMPRESSION      1. Encounter for medication refill    2. Encounter for medical screening examination    3. Tobacco dependence    4.  Chronic hypertension          DISPOSITION/PLAN   DISPOSITION Decision To Discharge 03/31/2021 03:30:37 PM      PATIENT REFERRED TO:  Rachel Sales MD  [address] [place]  [phone]    Call in 1 day        DISCHARGE MEDICATIONS:  Discharge Medication List as of 3/31/2021  4:38 PM             (Please note that portions of this note were completed with a voice recognition program.  Efforts were made to edit the dictations but occasionally words are mis-transcribed.)    Kimberly Saunders DO (electronically signed)  Attending Emergency Physician          Kimberly Saunders DO  04/02/21 8503

## 2021-06-16 ENCOUNTER — HOSPITAL ENCOUNTER (EMERGENCY)
Age: 46
Discharge: HOME | End: 2021-06-16
Payer: COMMERCIAL

## 2021-06-16 VITALS
OXYGEN SATURATION: 98 % | BODY MASS INDEX: 26.22 KG/M2 | HEIGHT: 63 IN | TEMPERATURE: 98.4 F | RESPIRATION RATE: 16 BRPM | HEART RATE: 103 BPM | WEIGHT: 148 LBS | SYSTOLIC BLOOD PRESSURE: 149 MMHG | DIASTOLIC BLOOD PRESSURE: 80 MMHG

## 2021-06-16 DIAGNOSIS — S00.412A ABRASION OF LEFT EAR CANAL, INITIAL ENCOUNTER: Primary | ICD-10-CM

## 2021-06-16 DIAGNOSIS — Z76.0 ENCOUNTER FOR MEDICATION REFILL: ICD-10-CM

## 2021-06-16 PROCEDURE — 6370000000 HC RX 637 (ALT 250 FOR IP): Performed by: PHYSICIAN ASSISTANT

## 2021-06-16 PROCEDURE — 99284 EMERGENCY DEPT VISIT MOD MDM: CPT

## 2021-06-16 RX ORDER — NEOMYCIN SULFATE, POLYMYXIN B SULFATE AND HYDROCORTISONE 10; 3.5; 1 MG/ML; MG/ML; [USP'U]/ML
3 SUSPENSION/ DROPS AURICULAR (OTIC) ONCE
Status: COMPLETED | OUTPATIENT
Start: 2021-06-16 | End: 2021-06-16

## 2021-06-16 RX ORDER — LEVOTHYROXINE SODIUM 0.15 MG/1
150 TABLET ORAL ONCE
Status: COMPLETED | OUTPATIENT
Start: 2021-06-16 | End: 2021-06-16

## 2021-06-16 RX ORDER — LEVOTHYROXINE SODIUM 0.15 MG/1
150 TABLET ORAL DAILY
Qty: 7 TABLET | Refills: 0 | Status: ON HOLD | OUTPATIENT
Start: 2021-06-16 | End: 2021-08-21 | Stop reason: HOSPADM

## 2021-06-16 RX ORDER — HYDROCHLOROTHIAZIDE 25 MG/1
25 TABLET ORAL DAILY
Qty: 7 TABLET | Refills: 0 | Status: SHIPPED | OUTPATIENT
Start: 2021-06-16 | End: 2021-06-23

## 2021-06-16 RX ORDER — HYDROCHLOROTHIAZIDE 25 MG/1
25 TABLET ORAL ONCE
Status: COMPLETED | OUTPATIENT
Start: 2021-06-16 | End: 2021-06-16

## 2021-06-16 RX ADMIN — NEOMYCIN SULFATE, POLYMYXIN B SULFATE AND HYDROCORTISONE 3 DROP: 10; 3.5; 1 SUSPENSION/ DROPS AURICULAR (OTIC) at 20:27

## 2021-06-16 RX ADMIN — LEVOTHYROXINE SODIUM 150 MCG: 0.15 TABLET ORAL at 20:29

## 2021-06-16 RX ADMIN — HYDROCHLOROTHIAZIDE 25 MG: 25 TABLET ORAL at 20:26

## 2021-06-16 ASSESSMENT — ENCOUNTER SYMPTOMS
ABDOMINAL PAIN: 0
SHORTNESS OF BREATH: 0
EYE PAIN: 0
ALLERGIC/IMMUNOLOGIC NEGATIVE: 1
COLOR CHANGE: 0
APNEA: 0
TROUBLE SWALLOWING: 0

## 2021-06-17 NOTE — ED NOTES
Patient A&Ox3, c/o left ear bleeding and drainage, started yesterday. Patient states she uses q-tips and does tend to be rough when cleaning her ears. No drainage noted on inspection of left ear.       Amy Erazo RN  06/16/21 2015

## 2021-06-17 NOTE — ED PROVIDER NOTES
noted above the remainder of the review of systems was reviewed and negative. PAST MEDICAL HISTORY     Past Medical History:   Diagnosis Date    Hypertension     Thyroid disease          SURGICAL HISTORY       Past Surgical History:   Procedure Laterality Date    CHOLECYSTECTOMY      TUBAL LIGATION           CURRENT MEDICATIONS       Current Discharge Medication List      CONTINUE these medications which have NOT CHANGED    Details   ondansetron (ZOFRAN ODT) 4 MG disintegrating tablet Take 1-2 tablets by mouth every 12 hours as needed for Nausea May Sub regular tablet (non-ODT) if insurance does not cover ODT. Qty: 12 tablet, Refills: 0      naproxen (NAPROSYN) 500 MG tablet Take 1 tablet by mouth 2 times daily for 20 doses  Qty: 20 tablet, Refills: 0      aspirin EC 81 MG EC tablet Take 81 mg by mouth daily             ALLERGIES     Patient has no known allergies. FAMILY HISTORY     History reviewed. No pertinent family history. SOCIAL HISTORY       Social History     Socioeconomic History    Marital status: Single     Spouse name: None    Number of children: None    Years of education: None    Highest education level: None   Occupational History    None   Tobacco Use    Smoking status: Current Every Day Smoker     Packs/day: 1.00     Years: 23.00     Pack years: 23.00    Smokeless tobacco: Never Used   Vaping Use    Vaping Use: Never used   Substance and Sexual Activity    Alcohol use: No     Comment: socially    Drug use: No    Sexual activity: Not Currently   Other Topics Concern    None   Social History Narrative    None     Social Determinants of Health     Financial Resource Strain:     Difficulty of Paying Living Expenses:    Food Insecurity:     Worried About Running Out of Food in the Last Year:     Ran Out of Food in the Last Year:    Transportation Needs:     Lack of Transportation (Medical):      Lack of Transportation (Non-Medical):    Physical Activity:     Days of Exercise per Week:     Minutes of Exercise per Session:    Stress:     Feeling of Stress :    Social Connections:     Frequency of Communication with Friends and Family:     Frequency of Social Gatherings with Friends and Family:     Attends Congregational Services:     Active Member of Clubs or Organizations:     Attends Club or Organization Meetings:     Marital Status:    Intimate Partner Violence:     Fear of Current or Ex-Partner:     Emotionally Abused:     Physically Abused:     Sexually Abused:        SCREENINGS           PHYSICAL EXAM    (up to 7 forlevel 4, 8 or more for level 5)     ED Triage Vitals [06/16/21 1922]   BP Temp Temp Source Pulse Resp SpO2 Height Weight   (!) 149/80 98.4 °F (36.9 °C) Oral 103 16 98 % 5' 3\" (1.6 m) 148 lb (67.1 kg)       Physical Exam  Vitals and nursing note reviewed. Constitutional:       General: She is not in acute distress. Appearance: She is well-developed. She is not diaphoretic. HENT:      Head: Normocephalic and atraumatic. Left Ear: Laceration (Abrasion to the 7:00 portion of the left ear canal) present. No middle ear effusion. Mouth/Throat:      Pharynx: No oropharyngeal exudate. Eyes:      General: No scleral icterus. Conjunctiva/sclera: Conjunctivae normal.      Pupils: Pupils are equal, round, and reactive to light. Neck:      Trachea: No tracheal deviation. Cardiovascular:      Rate and Rhythm: Normal rate. Heart sounds: Normal heart sounds. Pulmonary:      Effort: Pulmonary effort is normal. No respiratory distress. Breath sounds: Normal breath sounds. Abdominal:      General: Bowel sounds are normal. There is no distension. Palpations: Abdomen is soft. Musculoskeletal:         General: Normal range of motion. Cervical back: Normal range of motion and neck supple. Skin:     General: Skin is warm and dry. Findings: No erythema or rash.    Neurological:      Mental Status: She is alert and oriented to person, place, and time. Cranial Nerves: No cranial nerve deficit. Motor: No abnormal muscle tone. Psychiatric:         Behavior: Behavior normal.         Thought Content: Thought content normal.         Judgment: Judgment normal.           DIAGNOSTIC RESULTS     RADIOLOGY:   Non-plain film images such as CT, Ultrasound and MRI are read by the radiologist. Plain radiographic images are visualized and preliminarilyinterpreted by Dunia Espinoza PA-C with the below findings:      Interpretation per the Radiologist below, if available at the time of this note:    No orders to display       LABS:  Labs Reviewed - No data to display    All other labs were within normal range or not returnedas of this dictation. EMERGENCYDEPARTMENT COURSE and DIFFERENTIAL DIAGNOSIS/MDM:   Vitals:    Vitals:    06/16/21 1922   BP: (!) 149/80   Pulse: 103   Resp: 16   Temp: 98.4 °F (36.9 °C)   TempSrc: Oral   SpO2: 98%   Weight: 148 lb (67.1 kg)   Height: 5' 3\" (1.6 m)       [address] emergency department with an abrasion to the left ear canal from cleaning with Q-tips. Patient had a solitary episode of some blood in her sputum when clearing her throat. Patient does have mucosal edema in her nose with postnasal drip noted. Patient was advised on not sticking anything smaller than her elbow into her ear canal.  She will be given Cortisporin otic drops to go. In addition, patient will receive a 1 week supply of her hydrochlorothiazide and Synthroid as she is out of those prescriptions. MDM    PROCEDURES:    Procedures      FINAL IMPRESSION      1. Abrasion of left ear canal, initial encounter    2.  Encounter for medication refill          DISPOSITION/PLAN   DISPOSITION Decision To Discharge 06/16/2021 08:10:35 PM      PATIENT REFERRED TO:  Goldie Santizo MD  [address] [place]  [phone]    Call in 1 day        DISCHARGE MEDICATIONS:  Current Discharge Medication List          (Please note that portions of this note were completed with a voice recognition program.  Efforts were made to edit the dictations but occasionally words are mis-transcribed.)    MAGDA Lan PA-C  06/16/21 2011

## 2021-08-18 ENCOUNTER — HOSPITAL ENCOUNTER (INPATIENT)
Age: 46
LOS: 1 days | Discharge: HOME | DRG: 427 | End: 2021-08-21
Attending: INTERNAL MEDICINE | Admitting: INTERNAL MEDICINE
Payer: COMMERCIAL

## 2021-08-18 ENCOUNTER — APPOINTMENT (OUTPATIENT)
Dept: CT IMAGING | Age: 46
DRG: 427 | End: 2021-08-18
Payer: COMMERCIAL

## 2021-08-18 DIAGNOSIS — F14.10 NONDEPENDENT COCAINE ABUSE (HCC): ICD-10-CM

## 2021-08-18 DIAGNOSIS — R26.0 ATAXIC GAIT: ICD-10-CM

## 2021-08-18 DIAGNOSIS — R20.2 PARESTHESIA: Primary | ICD-10-CM

## 2021-08-18 PROBLEM — R29.90 STROKE-LIKE EPISODE: Status: ACTIVE | Noted: 2021-08-18

## 2021-08-18 LAB
ALBUMIN SERPL-MCNC: 4.7 G/DL (ref 3.5–4.6)
ALP BLD-CCNC: 56 U/L (ref 40–130)
ALT SERPL-CCNC: 29 U/L (ref 0–33)
AMPHETAMINE SCREEN, URINE: ABNORMAL
ANION GAP SERPL CALCULATED.3IONS-SCNC: 10 MEQ/L (ref 9–15)
APTT: 32.4 SEC (ref 24.4–36.8)
AST SERPL-CCNC: 53 U/L (ref 0–35)
BACTERIA: NEGATIVE /HPF
BARBITURATE SCREEN URINE: ABNORMAL
BASOPHILS ABSOLUTE: 0.1 K/UL (ref 0–0.2)
BASOPHILS RELATIVE PERCENT: 0.7 %
BENZODIAZEPINE SCREEN, URINE: ABNORMAL
BILIRUB SERPL-MCNC: 0.3 MG/DL (ref 0.2–0.7)
BILIRUBIN URINE: NEGATIVE
BLOOD, URINE: ABNORMAL
BUN BLDV-MCNC: 10 MG/DL (ref 6–20)
CALCIUM SERPL-MCNC: 9.9 MG/DL (ref 8.5–9.9)
CANNABINOID SCREEN URINE: ABNORMAL
CHLORIDE BLD-SCNC: 99 MEQ/L (ref 95–107)
CK MB: 8 NG/ML (ref 0–3.8)
CLARITY: ABNORMAL
CO2: 30 MEQ/L (ref 20–31)
COCAINE METABOLITE SCREEN URINE: POSITIVE
COLOR: ABNORMAL
CREAT SERPL-MCNC: 1.11 MG/DL (ref 0.5–0.9)
CREATINE KINASE-MB INDEX: 1.1 % (ref 0–3.5)
CRYSTALS, UA: ABNORMAL /HPF
EOSINOPHILS ABSOLUTE: 0.2 K/UL (ref 0–0.7)
EOSINOPHILS RELATIVE PERCENT: 1.4 %
EPITHELIAL CELLS, UA: ABNORMAL /HPF (ref 0–5)
ETHANOL PERCENT: NORMAL G/DL
ETHANOL: <10 MG/DL (ref 0–0.08)
GFR AFRICAN AMERICAN: >60
GFR NON-AFRICAN AMERICAN: 52.8
GLOBULIN: 3 G/DL (ref 2.3–3.5)
GLUCOSE BLD-MCNC: 74 MG/DL (ref 70–99)
GLUCOSE URINE: NEGATIVE MG/DL
HCT VFR BLD CALC: 48.7 % (ref 37–47)
HEMOGLOBIN: 16.8 G/DL (ref 12–16)
HYALINE CASTS: ABNORMAL /LPF (ref 0–5)
INR BLD: 0.9
KETONES, URINE: ABNORMAL MG/DL
LACTIC ACID: 1 MMOL/L (ref 0.5–2.2)
LEUKOCYTE ESTERASE, URINE: ABNORMAL
LYMPHOCYTES ABSOLUTE: 2.2 K/UL (ref 1–4.8)
LYMPHOCYTES RELATIVE PERCENT: 19.7 %
Lab: ABNORMAL
MAGNESIUM: 2.2 MG/DL (ref 1.7–2.4)
MCH RBC QN AUTO: 31.8 PG (ref 27–31.3)
MCHC RBC AUTO-ENTMCNC: 34.5 % (ref 33–37)
MCV RBC AUTO: 92 FL (ref 82–100)
METHADONE SCREEN, URINE: ABNORMAL
MONOCYTES ABSOLUTE: 0.5 K/UL (ref 0.2–0.8)
MONOCYTES RELATIVE PERCENT: 4.4 %
MUCUS: PRESENT /LPF
NEUTROPHILS ABSOLUTE: 8.1 K/UL (ref 1.4–6.5)
NEUTROPHILS RELATIVE PERCENT: 73.8 %
NITRITE, URINE: NEGATIVE
OPIATE SCREEN URINE: ABNORMAL
OXYCODONE URINE: ABNORMAL
PDW BLD-RTO: 14.3 % (ref 11.5–14.5)
PH UA: 5.5 (ref 5–9)
PHENCYCLIDINE SCREEN URINE: ABNORMAL
PLATELET # BLD: 352 K/UL (ref 130–400)
POTASSIUM SERPL-SCNC: 4.3 MEQ/L (ref 3.4–4.9)
PROPOXYPHENE SCREEN: ABNORMAL
PROTEIN UA: 30 MG/DL
PROTHROMBIN TIME: 12.2 SEC (ref 12.3–14.9)
RBC # BLD: 5.29 M/UL (ref 4.2–5.4)
RBC UA: ABNORMAL /HPF (ref 0–5)
SODIUM BLD-SCNC: 139 MEQ/L (ref 135–144)
SPECIFIC GRAVITY UA: 1.03 (ref 1–1.03)
TOTAL CK: 718 U/L (ref 0–170)
TOTAL PROTEIN: 7.7 G/DL (ref 6.3–8)
TROPONIN: <0.01 NG/ML (ref 0–0.01)
URINE REFLEX TO CULTURE: YES
UROBILINOGEN, URINE: 1 E.U./DL
WBC # BLD: 10.9 K/UL (ref 4.8–10.8)
WBC UA: ABNORMAL /HPF (ref 0–5)
YEAST: PRESENT /HPF

## 2021-08-18 PROCEDURE — 82077 ASSAY SPEC XCP UR&BREATH IA: CPT

## 2021-08-18 PROCEDURE — 85610 PROTHROMBIN TIME: CPT

## 2021-08-18 PROCEDURE — 81001 URINALYSIS AUTO W/SCOPE: CPT

## 2021-08-18 PROCEDURE — 70450 CT HEAD/BRAIN W/O DYE: CPT

## 2021-08-18 PROCEDURE — 83735 ASSAY OF MAGNESIUM: CPT

## 2021-08-18 PROCEDURE — 82550 ASSAY OF CK (CPK): CPT

## 2021-08-18 PROCEDURE — 6370000000 HC RX 637 (ALT 250 FOR IP): Performed by: PHYSICIAN ASSISTANT

## 2021-08-18 PROCEDURE — 84484 ASSAY OF TROPONIN QUANT: CPT

## 2021-08-18 PROCEDURE — 36415 COLL VENOUS BLD VENIPUNCTURE: CPT

## 2021-08-18 PROCEDURE — 82553 CREATINE MB FRACTION: CPT

## 2021-08-18 PROCEDURE — 80307 DRUG TEST PRSMV CHEM ANLYZR: CPT

## 2021-08-18 PROCEDURE — 93005 ELECTROCARDIOGRAM TRACING: CPT | Performed by: PHYSICIAN ASSISTANT

## 2021-08-18 PROCEDURE — 2580000003 HC RX 258: Performed by: INTERNAL MEDICINE

## 2021-08-18 PROCEDURE — 83605 ASSAY OF LACTIC ACID: CPT

## 2021-08-18 PROCEDURE — 85025 COMPLETE CBC W/AUTO DIFF WBC: CPT

## 2021-08-18 PROCEDURE — 80053 COMPREHEN METABOLIC PANEL: CPT

## 2021-08-18 PROCEDURE — 87086 URINE CULTURE/COLONY COUNT: CPT

## 2021-08-18 PROCEDURE — 99285 EMERGENCY DEPT VISIT HI MDM: CPT

## 2021-08-18 PROCEDURE — 6370000000 HC RX 637 (ALT 250 FOR IP): Performed by: INTERNAL MEDICINE

## 2021-08-18 PROCEDURE — G0378 HOSPITAL OBSERVATION PER HR: HCPCS

## 2021-08-18 PROCEDURE — 85730 THROMBOPLASTIN TIME PARTIAL: CPT

## 2021-08-18 RX ORDER — NAPROXEN 500 MG/1
500 TABLET ORAL 2 TIMES DAILY
Status: DISCONTINUED | OUTPATIENT
Start: 2021-08-18 | End: 2021-08-21 | Stop reason: HOSPADM

## 2021-08-18 RX ORDER — ASPIRIN 81 MG/1
81 TABLET ORAL DAILY
Status: DISCONTINUED | OUTPATIENT
Start: 2021-08-18 | End: 2021-08-21 | Stop reason: HOSPADM

## 2021-08-18 RX ORDER — LEVOTHYROXINE SODIUM 0.07 MG/1
150 TABLET ORAL DAILY
Status: DISCONTINUED | OUTPATIENT
Start: 2021-08-18 | End: 2021-08-20

## 2021-08-18 RX ORDER — ASPIRIN 300 MG/1
300 SUPPOSITORY RECTAL DAILY
Status: DISCONTINUED | OUTPATIENT
Start: 2021-08-18 | End: 2021-08-21 | Stop reason: HOSPADM

## 2021-08-18 RX ORDER — SODIUM CHLORIDE 0.9 % (FLUSH) 0.9 %
5-40 SYRINGE (ML) INJECTION PRN
Status: DISCONTINUED | OUTPATIENT
Start: 2021-08-18 | End: 2021-08-21 | Stop reason: HOSPADM

## 2021-08-18 RX ORDER — ASPIRIN 325 MG
325 TABLET ORAL ONCE
Status: COMPLETED | OUTPATIENT
Start: 2021-08-18 | End: 2021-08-18

## 2021-08-18 RX ORDER — ROSUVASTATIN CALCIUM 40 MG/1
40 TABLET, COATED ORAL NIGHTLY
Status: DISCONTINUED | OUTPATIENT
Start: 2021-08-18 | End: 2021-08-21 | Stop reason: HOSPADM

## 2021-08-18 RX ORDER — POLYETHYLENE GLYCOL 3350 17 G/17G
17 POWDER, FOR SOLUTION ORAL DAILY PRN
Status: DISCONTINUED | OUTPATIENT
Start: 2021-08-18 | End: 2021-08-21 | Stop reason: HOSPADM

## 2021-08-18 RX ORDER — SODIUM CHLORIDE 9 MG/ML
25 INJECTION, SOLUTION INTRAVENOUS PRN
Status: DISCONTINUED | OUTPATIENT
Start: 2021-08-18 | End: 2021-08-21 | Stop reason: HOSPADM

## 2021-08-18 RX ORDER — SODIUM CHLORIDE 0.9 % (FLUSH) 0.9 %
5-40 SYRINGE (ML) INJECTION EVERY 12 HOURS SCHEDULED
Status: DISCONTINUED | OUTPATIENT
Start: 2021-08-18 | End: 2021-08-21 | Stop reason: HOSPADM

## 2021-08-18 RX ORDER — ONDANSETRON 2 MG/ML
4 INJECTION INTRAMUSCULAR; INTRAVENOUS EVERY 6 HOURS PRN
Status: DISCONTINUED | OUTPATIENT
Start: 2021-08-18 | End: 2021-08-21 | Stop reason: HOSPADM

## 2021-08-18 RX ORDER — SODIUM CHLORIDE 9 MG/ML
INJECTION, SOLUTION INTRAVENOUS CONTINUOUS
Status: DISCONTINUED | OUTPATIENT
Start: 2021-08-18 | End: 2021-08-20

## 2021-08-18 RX ORDER — ONDANSETRON 4 MG/1
4 TABLET, ORALLY DISINTEGRATING ORAL EVERY 8 HOURS PRN
Status: DISCONTINUED | OUTPATIENT
Start: 2021-08-18 | End: 2021-08-21 | Stop reason: HOSPADM

## 2021-08-18 RX ORDER — HYDROCHLOROTHIAZIDE 25 MG/1
25 TABLET ORAL DAILY
Status: DISCONTINUED | OUTPATIENT
Start: 2021-08-18 | End: 2021-08-21 | Stop reason: HOSPADM

## 2021-08-18 RX ADMIN — Medication 10 ML: at 21:31

## 2021-08-18 RX ADMIN — ROSUVASTATIN CALCIUM 40 MG: 40 TABLET, FILM COATED ORAL at 21:19

## 2021-08-18 RX ADMIN — ASPIRIN 325 MG: 325 TABLET, FILM COATED ORAL at 16:17

## 2021-08-18 RX ADMIN — SODIUM CHLORIDE: 9 INJECTION, SOLUTION INTRAVENOUS at 21:27

## 2021-08-18 RX ADMIN — NAPROXEN 500 MG: 500 TABLET ORAL at 21:19

## 2021-08-18 ASSESSMENT — PAIN SCALES - GENERAL: PAINLEVEL_OUTOF10: 5

## 2021-08-18 ASSESSMENT — ENCOUNTER SYMPTOMS
SHORTNESS OF BREATH: 0
TROUBLE SWALLOWING: 0
ALLERGIC/IMMUNOLOGIC NEGATIVE: 1
COLOR CHANGE: 0
EYE PAIN: 0
ABDOMINAL PAIN: 0
APNEA: 0

## 2021-08-18 NOTE — ED PROVIDER NOTES
[address] [facility] ED  EMERGENCY DEPARTMENT ENCOUNTER      Pt Name: David Valdez  MRN: [id number]  [place] 1975  Date of evaluation: 8/18/2021  Provider: Kimberly Aleman PA-C    CHIEF COMPLAINT       Chief Complaint   Patient presents with    Numbness     right side         HISTORY OF PRESENT ILLNESS   (Location/Symptom, Timing/Onset, Context/Setting, Quality, Duration, Modifying Factors, Severity)  Note limiting factors. David Valdez is a 55 y.o. female who presents to the emergency department 1 week history of right-sided numbness and tingling as well as ataxic gait. Patient states that symptoms began approximately 1 week ago. Patient states that she did not seek treatment that time because she thought that a stroke was only on the left side. Patient states that she feels like she is \"walking to the right \". Patient denies any obvious muscle weakness. Patient does state intermittent frontal headaches as well. Patient denies any falls or blunt trauma to the area that time because she thought that a stroke was only on the left side. Patient states that she feels like she is \"walking to the right    HPI    Nursing Notes were reviewed. REVIEW OF SYSTEMS    (2-9 systems for level 4, 10 or more for level 5)     Review of Systems   Constitutional: Negative for diaphoresis and fever. HENT: Negative for hearing loss and trouble swallowing. Eyes: Negative for pain. Respiratory: Negative for apnea and shortness of breath. Cardiovascular: Negative for chest pain. Gastrointestinal: Negative for abdominal pain. Endocrine: Negative. Genitourinary: Negative for hematuria. Musculoskeletal: Negative for neck pain and neck stiffness. Skin: Negative for color change. Allergic/Immunologic: Negative. Neurological: Positive for numbness and headaches. Negative for dizziness. Hematological: Negative. Psychiatric/Behavioral: Negative.     All other systems reviewed and are negative. Except as noted above the remainder of the review of systems was reviewed and negative. PAST MEDICAL HISTORY     Past Medical History:   Diagnosis Date    Hypertension     Thyroid disease          SURGICAL HISTORY       Past Surgical History:   Procedure Laterality Date    CHOLECYSTECTOMY      TUBAL LIGATION           CURRENT MEDICATIONS       Previous Medications    ASPIRIN EC 81 MG EC TABLET    Take 81 mg by mouth daily    HYDROCHLOROTHIAZIDE (HYDRODIURIL) 25 MG TABLET    Take 1 tablet by mouth daily for 7 days    LEVOTHYROXINE (SYNTHROID) 150 MCG TABLET    Take 1 tablet by mouth daily for 7 days    NAPROXEN (NAPROSYN) 500 MG TABLET    Take 1 tablet by mouth 2 times daily for 20 doses    ONDANSETRON (ZOFRAN ODT) 4 MG DISINTEGRATING TABLET    Take 1-2 tablets by mouth every 12 hours as needed for Nausea May Sub regular tablet (non-ODT) if insurance does not cover ODT. ALLERGIES     Patient has no known allergies. FAMILY HISTORY     No family history on file.        SOCIAL HISTORY       Social History     Socioeconomic History    Marital status: Single     Spouse name: Not on file    Number of children: Not on file    Years of education: Not on file    Highest education level: Not on file   Occupational History    Not on file   Tobacco Use    Smoking status: Current Every Day Smoker     Packs/day: 1.00     Years: 23.00     Pack years: 23.00    Smokeless tobacco: Never Used   Vaping Use    Vaping Use: Never used   Substance and Sexual Activity    Alcohol use: No     Comment: socially    Drug use: No    Sexual activity: Not Currently   Other Topics Concern    Not on file   Social History Narrative    Not on file     Social Determinants of Health     Financial Resource Strain:     Difficulty of Paying Living Expenses:    Food Insecurity:     Worried About Running Out of Food in the Last Year:     [address] in the Last Year:    Transportation Needs:     Lack of Transportation (Medical):  Lack of Transportation (Non-Medical):    Physical Activity:     Days of Exercise per Week:     Minutes of Exercise per Session:    Stress:     Feeling of Stress :    Social Connections:     Frequency of Communication with Friends and Family:     Frequency of Social Gatherings with Friends and Family:     Attends [employer] Services:     Active Member of Clubs or Organizations:     Attends Club or Organization Meetings:     Marital Status:    Intimate Partner Violence:     Fear of Current or Ex-Partner:     Emotionally Abused:     Physically Abused:     Sexually Abused:        SCREENINGS                        PHYSICAL EXAM    (up to 7 for level 4, 8 or more for level 5)     ED Triage Vitals [08/18/21 1358]   BP Temp Temp Source Pulse Resp SpO2 Height Weight   123/84 98.7 °F (37.1 °C) Oral 74 16 99 % 5' 2\" (1.575 m) 148 lb (67.1 kg)       Physical Exam  Vitals and nursing note reviewed. Constitutional:       General: She is not in acute distress. Appearance: She is well-developed. She is not diaphoretic. HENT:      Head: Normocephalic and atraumatic. Mouth/Throat:      Pharynx: No oropharyngeal exudate. Eyes:      General: No scleral icterus. Conjunctiva/sclera: Conjunctivae normal.      Pupils: Pupils are equal, round, and reactive to light. Neck:      Trachea: No tracheal deviation. Cardiovascular:      Rate and Rhythm: Normal rate. Heart sounds: Normal heart sounds. Pulmonary:      Effort: Pulmonary effort is normal. No respiratory distress. Breath sounds: Normal breath sounds. Abdominal:      General: Bowel sounds are normal. There is no distension. Palpations: Abdomen is soft. Musculoskeletal:         General: Normal range of motion. Cervical back: Normal range of motion and neck supple. Skin:     General: Skin is warm and dry. Findings: No erythema or rash.    Neurological:      Mental Status: She is alert and oriented to person, place, and time. Cranial Nerves: No cranial nerve deficit. Sensory: Sensory deficit (Diminished two-point discrimination to the right side) present. Motor: No abnormal muscle tone. Psychiatric:         Behavior: Behavior normal.         Thought Content: Thought content normal.         Judgment: Judgment normal.         DIAGNOSTIC RESULTS     EKG: All EKG's are interpreted by the Emergency Department Physician who either signs or Co-signs this chart in the absence of a cardiologist.    Normal sinus rhythm, rate 64 bpm, rightward axis, no acute ST elevation    RADIOLOGY:   Non-plain film images such as CT, Ultrasound and MRI are read by the radiologist. Plain radiographic images are visualized and preliminarily interpreted by the emergency physician with the below findings:      Interpretation per the Radiologist below, if available at the time of this note:    CT Head WO Contrast   Final Result      NO EVIDENCE FOR ACUTE INTRACRANIAL PROCESS. 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 NO SIGNIFICANT CHANGE FROM PRIOR EXAM. CONSIDER CORRELATION WITH MRI. All CT scans at this facility use dose modulation, iterative reconstruction, and/or weight based dosing when appropriate to reduce radiation dose to as low as reasonably achievable.                ED BEDSIDE ULTRASOUND:   Performed by ED Physician - none    LABS:  Labs Reviewed   COMPREHENSIVE METABOLIC PANEL - Abnormal; Notable for the following components:       Result Value    CREATININE 1.11 (*)     GFR Non- 52.8 (*)     Albumin 4.7 (*)     AST 53 (*)     All other components within normal limits   CBC WITH AUTO DIFFERENTIAL - Abnormal; Notable for the following components:    WBC 10.9 (*)     Hemoglobin 16.8 (*)     Hematocrit 48.7 (*)     MCH 31.8 (*)     Neutrophils Absolute 8.1 (*)     All other components within normal limits   PROTIME-INR - Abnormal; Notable for the following components:    Protime 12.2 (*)     All other components within normal limits   URINE DRUG SCREEN - Abnormal; Notable for the following components:    Cocaine Metabolite Screen, Urine POSITIVE (*)     All other components within normal limits   CK - Abnormal; Notable for the following components: Total  (*)     All other components within normal limits   APTT   MAGNESIUM   ETHANOL   LACTIC ACID, PLASMA   TROPONIN   URINE RT REFLEX TO CULTURE   CKMB & RELATIVE PERCENT       All other labs were within normal range or not returned as of this dictation. EMERGENCY DEPARTMENT COURSE and DIFFERENTIAL DIAGNOSIS/MDM:   Vitals:    Vitals:    08/18/21 1358 08/18/21 1601   BP: 123/84 101/81   Pulse: 74 61   Resp: 16 16   Temp: 98.7 °F (37.1 °C)    TempSrc: Oral    SpO2: 99% (!) 6%   Weight: 148 lb (67.1 kg)    Height: 5' 2\" (1.575 m)        MDM      REASSESSMENT        Presented the emergency department with a 1 week history of right-sided numbness and tingling and \"walking to the right\". Patient has an obvious ataxic gait on exam today. NIH is 1 but given the length of time of 1 week the patient would not be considered a TPA candidate. Initial CT is negative. Patient is cocaine positive and I discussed cocaine is a causative factor leading to strokes. Patient will be admitted for further evaluation and care. She was given 325 mg of aspirin in the emergency department    CONSULTS:  None    PROCEDURES:  Unless otherwise noted below, none     Procedures        FINAL IMPRESSION      1. Paresthesia    2. Ataxic gait    3. Nondependent cocaine abuse [facility])          DISPOSITION/PLAN   DISPOSITION Decision To Admit 08/18/2021 04:10:43 PM      PATIENT REFERRED TO:  No follow-up provider specified. DISCHARGE MEDICATIONS:  New Prescriptions    No medications on file     Controlled Substances Monitoring:     No flowsheet data found.     (Please note that portions of this note were completed with a voice recognition program.  Efforts were made to edit the dictations but occasionally words are mis-transcribed.)    Sherry Narayan PA-C (electronically signed)  Attending Emergency Physician            Sherry Narayan PA-C  08/18/21 4730

## 2021-08-18 NOTE — ED NOTES
Multiple attempts to call reports on patient. Left on hold for 4 minutes.       Cachorro Barragan RN  08/18/21 1218

## 2021-08-18 NOTE — H&P
History and Physical    Admit Date: 8/18/2021  PCP: Irene Moran MD    CHIEF COMPLAINT:    Chief Complaint   Patient presents with    Numbness     right side        HISTORY OF PRESENT ILLNESS:    The patient is a 55 y.o. female with a history of cocaine abuse, hypertension and hypothyroidism who presents from home with right-sided numbness. The symptoms started last week. She feels also generally\" slow\" and her friend noticed that she is leaning toward the right side when she is walking. She denies any weakness otherwise or numbness or tingling other than her right upper and lower extremities. No fever or chills. No nausea vomiting or abdominal pain. No dyspnea. No chest pain. She used cocaine last time was yesterday. Positive for cocaine in the ED. CT of the head is negative except for incidental finding of macroadenoma. She denies visual symptoms. No headache.     Past Medical History:        Diagnosis Date    Hypertension     Thyroid disease        Past Surgical History:        Procedure Laterality Date    CHOLECYSTECTOMY      TUBAL LIGATION         Social History:   Social History     Socioeconomic History    Marital status: Single     Spouse name: Not on file    Number of children: Not on file    Years of education: Not on file    Highest education level: Not on file   Occupational History    Not on file   Tobacco Use    Smoking status: Current Every Day Smoker     Packs/day: 1.00     Years: 23.00     Pack years: 23.00    Smokeless tobacco: Never Used   Vaping Use    Vaping Use: Never used   Substance and Sexual Activity    Alcohol use: Yes     Comment: 3 bud ice 24oz    Drug use: Yes     Types: Cocaine     Comment: last use yesterday    Sexual activity: Not Currently   Other Topics Concern    Not on file   Social History Narrative    Not on file     Social Determinants of Health     Financial Resource Strain:     Difficulty of Paying Living Expenses:    Food Insecurity: SpO2 98%   BMI 27.07 kg/m²       Pulse Ox: SpO2  Av.7 %  Min: 6 %  Max: 99 %  Supplemental O2:      CONSTITUTIONAL:  awake, alert, cooperative, no apparent distress, and appears stated age  HEENT: Normocephalic, PERRLA  NECK: no JVD, no LAD  HEART: RRR, no murmurs, gallops, or rubs  LUNGS: clear to auscultation bilaterally, no wheezes, crackles, or rhonchi. ABDOMEN: soft/NT/ND, positive BS  MUSCULOSKELETAL: negative for edema, +2 pulses  SKIN: intact without rash or jaundice  NEURO:  CN intact and no focal deficits, subjectively has right-sided numbness and tingling. DATA:  Recent Results (from the past 24 hour(s))   CBC Auto Differential    Collection Time: 21  2:30 PM   Result Value Ref Range    WBC 10.9 (H) 4.8 - 10.8 K/uL    RBC 5.29 4. 20 - 5.40 M/uL    Hemoglobin 16.8 (H) 12.0 - 16.0 g/dL    Hematocrit 48.7 (H) 37.0 - 47.0 %    MCV 92.0 82.0 - 100.0 fL    MCH 31.8 (H) 27.0 - 31.3 pg    MCHC 34.5 33.0 - 37.0 %    RDW 14.3 11.5 - 14.5 %    Platelets 604 693 - 176 K/uL    Neutrophils % 73.8 %    Lymphocytes % 19.7 %    Monocytes % 4.4 %    Eosinophils % 1.4 %    Basophils % 0.7 %    Neutrophils Absolute 8.1 (H) 1.4 - 6.5 K/uL    Lymphocytes Absolute 2.2 1.0 - 4.8 K/uL    Monocytes Absolute 0.5 0.2 - 0.8 K/uL    Eosinophils Absolute 0.2 0.0 - 0.7 K/uL    Basophils Absolute 0.1 0.0 - 0.2 K/uL   Protime-INR    Collection Time: 21  2:30 PM   Result Value Ref Range    Protime 12.2 (L) 12.3 - 14.9 sec    INR 0.9    APTT    Collection Time: 21  2:30 PM   Result Value Ref Range    aPTT 32.4 24.4 - 36.8 sec   Urine Drug Screen    Collection Time: 21  2:30 PM   Result Value Ref Range    Amphetamine Screen, Urine Neg Negative <1000 ng/mL    Barbiturate Screen, Ur Neg Negative < 200 ng/mL    Benzodiazepine Screen, Urine Neg Negative < 200 ng/mL    Cannabinoid Scrn, Ur Neg Negative < 50 ng/mL    Cocaine Metabolite Screen, Urine POSITIVE (A) Negative < 300 ng/mL    Opiate Scrn, Ur Neg Negative < 300 ng/mL    PCP Screen, Urine Neg Negative < 25 ng/mL    Methadone Screen, Urine Neg Negative <300 ng/mL    Propoxyphene Scrn, Ur Neg Negative <300 ng/mL    Oxycodone Urine Neg Negative <100 ng/mL    Drug Screen Comment: see below    Ethanol    Collection Time: 08/18/21  2:30 PM   Result Value Ref Range    Ethanol Lvl <10 mg/dL    Ethanol percent Not indicated G/dL   Lactic Acid, Plasma    Collection Time: 08/18/21  2:30 PM   Result Value Ref Range    Lactic Acid 1.0 0.5 - 2.2 mmol/L   Comprehensive Metabolic Panel    Collection Time: 08/18/21  2:45 PM   Result Value Ref Range    Sodium 139 135 - 144 mEq/L    Potassium 4.3 3.4 - 4.9 mEq/L    Chloride 99 95 - 107 mEq/L    CO2 30 20 - 31 mEq/L    Anion Gap 10 9 - 15 mEq/L    Glucose 74 70 - 99 mg/dL    BUN 10 6 - 20 mg/dL    CREATININE 1.11 (H) 0.50 - 0.90 mg/dL    GFR Non-African American 52.8 (L) >60    GFR  >60.0 >60    Calcium 9.9 8.5 - 9.9 mg/dL    Total Protein 7.7 6.3 - 8.0 g/dL    Albumin 4.7 (H) 3.5 - 4.6 g/dL    Total Bilirubin 0.3 0.2 - 0.7 mg/dL    Alkaline Phosphatase 56 40 - 130 U/L    ALT 29 0 - 33 U/L    AST 53 (H) 0 - 35 U/L    Globulin 3.0 2.3 - 3.5 g/dL   Magnesium    Collection Time: 08/18/21  2:45 PM   Result Value Ref Range    Magnesium 2.2 1.7 - 2.4 mg/dL   Troponin    Collection Time: 08/18/21  2:45 PM   Result Value Ref Range    Troponin <0.010 0.000 - 0.010 ng/mL   CK    Collection Time: 08/18/21  2:45 PM   Result Value Ref Range    Total  (H) 0 - 170 U/L   EKG 12 Lead - Chest Pain    Collection Time: 08/18/21  2:56 PM   Result Value Ref Range    Ventricular Rate 64 BPM    Atrial Rate 64 BPM    P-R Interval 154 ms    QRS Duration 84 ms    Q-T Interval 418 ms    QTc Calculation (Bazett) 431 ms    P Axis 43 degrees    R Axis 94 degrees    T Axis 98 degrees           ASSESSMENT AND PLAN:    Strokelike symptom-right-sided numbness and tingling, gait ataxia  Cocaine abuse  Nontraumatic rhabdomyolysis-likely due to cocaine abuse  Hypertension  Hypothyroidism    Plan  Observe on telemetry neuro floor  Start aspirin, statin, obtain MRI of the brain and MRA of the head, carotid ultrasound, echocardiogram, neurology consult, PT/OT per stroke protocol, neurochecks per stroke protocol  SLP eval  Home meds resumed  IVF, recheck CK in the morning  Educated on cocaine abuse  DVT prophylaxis  Full code       Code status: No Order    Electronically signed by Quita Swartz DO on 8/18/21 at 4:50 PM EDT

## 2021-08-18 NOTE — CARE COORDINATION
[facility] [place] Case Management   Initial Discharge Assessment    Met with patient at bedside in ER to discuss discharge plan. PCP: Hannah Sorto MD                                  Date of Last Visit: \"over a year ago\"  If no PCP, list provided? N/A    Discharge Planning    Living Arrangements: Patient states she lives independently at home. Who do you live with? Patient reports living alone. Who helps you with your care: Patient states that she does not have anyone to assist her. If lives at home: Do you have any barriers navigating in your home? No    Patient can perform ADL? Yes    Current Services (outpatient and in home): None. Patient refuses assistance with drug and alcohol abuse including LGR or other CD referrals, stating \"I have been to rehab a gazilion times, I've worked with [place] too. I don't want any of that now. \"    Dialysis: No    Is transportation available to get to your appointments? Yes    DME Equipment: None    Respiratory equipment: None    Respiratory provider: JANETH     Pharmacy: [facility] on [address] in [address] [place] [address] with Medication Assistance Program?  No - has [employer]. Patient agreeable to [address]? No    Patient agreeable to SNF/Rehab? No    Other discharge needs identified? N/A - Patient declined referrals. Does Patient Have a High-Risk for Readmission Diagnosis (CHF, PN, MI, COPD)? No    Initial Discharge Plan? (Note: please see concurrent daily documentation for any updates after initial note). Home alone, declines referrals.     Readmission Risk              Risk of Unplanned Readmission:  0         Electronically signed by Roseline Ocasio RN on 8/18/2021 at 5:32 PM

## 2021-08-19 ENCOUNTER — APPOINTMENT (OUTPATIENT)
Dept: MRI IMAGING | Age: 46
DRG: 427 | End: 2021-08-19
Payer: COMMERCIAL

## 2021-08-19 ENCOUNTER — APPOINTMENT (OUTPATIENT)
Dept: ULTRASOUND IMAGING | Age: 46
DRG: 427 | End: 2021-08-19
Payer: COMMERCIAL

## 2021-08-19 LAB
CHOLESTEROL, TOTAL: 222 MG/DL (ref 0–199)
CK MB: 5.1 NG/ML (ref 0–3.8)
CORTISOL - AM: 4.8 UG/DL (ref 6.2–19.4)
CREATINE KINASE-MB INDEX: 1.1 % (ref 0–3.5)
EKG ATRIAL RATE: 64 BPM
EKG P AXIS: 43 DEGREES
EKG P-R INTERVAL: 154 MS
EKG Q-T INTERVAL: 418 MS
EKG QRS DURATION: 84 MS
EKG QTC CALCULATION (BAZETT): 431 MS
EKG R AXIS: 94 DEGREES
EKG T AXIS: 98 DEGREES
EKG VENTRICULAR RATE: 64 BPM
FOLLICLE STIMULATING HORMONE: 70.9 MIU/ML
HBA1C MFR BLD: 5.6 % (ref 4.8–5.9)
HCT VFR BLD CALC: 43.8 % (ref 37–47)
HDLC SERPL-MCNC: 65 MG/DL (ref 40–59)
HEMOGLOBIN: 14.7 G/DL (ref 12–16)
LDL CHOLESTEROL CALCULATED: 133 MG/DL (ref 0–129)
LUTEINIZING HORMONE: 4.3 MIU/ML
MCH RBC QN AUTO: 31.6 PG (ref 27–31.3)
MCHC RBC AUTO-ENTMCNC: 33.6 % (ref 33–37)
MCV RBC AUTO: 94.1 FL (ref 82–100)
PDW BLD-RTO: 14.3 % (ref 11.5–14.5)
PLATELET # BLD: 324 K/UL (ref 130–400)
PROLACTIN: 105 NG/ML
RBC # BLD: 4.66 M/UL (ref 4.2–5.4)
T4 FREE: 0.2 NG/DL (ref 0.84–1.68)
TOTAL CK: 461 U/L (ref 0–170)
TRIGL SERPL-MCNC: 118 MG/DL (ref 0–150)
TSH SERPL DL<=0.05 MIU/L-ACNC: 780.6 UIU/ML (ref 0.44–3.86)
WBC # BLD: 8.8 K/UL (ref 4.8–10.8)

## 2021-08-19 PROCEDURE — 93880 EXTRACRANIAL BILAT STUDY: CPT

## 2021-08-19 PROCEDURE — 82553 CREATINE MB FRACTION: CPT

## 2021-08-19 PROCEDURE — 96372 THER/PROPH/DIAG INJ SC/IM: CPT

## 2021-08-19 PROCEDURE — 36415 COLL VENOUS BLD VENIPUNCTURE: CPT

## 2021-08-19 PROCEDURE — 82533 TOTAL CORTISOL: CPT

## 2021-08-19 PROCEDURE — 6360000002 HC RX W HCPCS: Performed by: INTERNAL MEDICINE

## 2021-08-19 PROCEDURE — 97162 PT EVAL MOD COMPLEX 30 MIN: CPT

## 2021-08-19 PROCEDURE — 86376 MICROSOMAL ANTIBODY EACH: CPT

## 2021-08-19 PROCEDURE — 93010 ELECTROCARDIOGRAM REPORT: CPT | Performed by: INTERNAL MEDICINE

## 2021-08-19 PROCEDURE — 70551 MRI BRAIN STEM W/O DYE: CPT

## 2021-08-19 PROCEDURE — 83036 HEMOGLOBIN GLYCOSYLATED A1C: CPT

## 2021-08-19 PROCEDURE — 82550 ASSAY OF CK (CPK): CPT

## 2021-08-19 PROCEDURE — 84146 ASSAY OF PROLACTIN: CPT

## 2021-08-19 PROCEDURE — 83001 ASSAY OF GONADOTROPIN (FSH): CPT

## 2021-08-19 PROCEDURE — 83002 ASSAY OF GONADOTROPIN (LH): CPT

## 2021-08-19 PROCEDURE — 83003 ASSAY GROWTH HORMONE (HGH): CPT

## 2021-08-19 PROCEDURE — 6370000000 HC RX 637 (ALT 250 FOR IP): Performed by: INTERNAL MEDICINE

## 2021-08-19 PROCEDURE — 70544 MR ANGIOGRAPHY HEAD W/O DYE: CPT

## 2021-08-19 PROCEDURE — 96374 THER/PROPH/DIAG INJ IV PUSH: CPT

## 2021-08-19 PROCEDURE — G0378 HOSPITAL OBSERVATION PER HR: HCPCS

## 2021-08-19 PROCEDURE — 85027 COMPLETE CBC AUTOMATED: CPT

## 2021-08-19 PROCEDURE — 99254 IP/OBS CNSLTJ NEW/EST MOD 60: CPT | Performed by: INTERNAL MEDICINE

## 2021-08-19 PROCEDURE — 2580000003 HC RX 258: Performed by: INTERNAL MEDICINE

## 2021-08-19 PROCEDURE — 80061 LIPID PANEL: CPT

## 2021-08-19 PROCEDURE — 82024 ASSAY OF ACTH: CPT

## 2021-08-19 PROCEDURE — 84443 ASSAY THYROID STIM HORMONE: CPT

## 2021-08-19 PROCEDURE — 84439 ASSAY OF FREE THYROXINE: CPT

## 2021-08-19 RX ORDER — LORAZEPAM 2 MG/ML
1 INJECTION INTRAMUSCULAR ONCE
Status: COMPLETED | OUTPATIENT
Start: 2021-08-19 | End: 2021-08-19

## 2021-08-19 RX ORDER — CABERGOLINE 0.5 MG/1
0.5 TABLET ORAL
Status: DISCONTINUED | OUTPATIENT
Start: 2021-08-19 | End: 2021-08-21 | Stop reason: HOSPADM

## 2021-08-19 RX ORDER — CEFUROXIME AXETIL 500 MG/1
500 TABLET ORAL EVERY 12 HOURS SCHEDULED
Status: DISCONTINUED | OUTPATIENT
Start: 2021-08-19 | End: 2021-08-21 | Stop reason: HOSPADM

## 2021-08-19 RX ORDER — COSYNTROPIN 0.25 MG/ML
250 INJECTION, POWDER, FOR SOLUTION INTRAMUSCULAR; INTRAVENOUS ONCE
Status: COMPLETED | OUTPATIENT
Start: 2021-08-20 | End: 2021-08-20

## 2021-08-19 RX ADMIN — Medication 10 ML: at 09:57

## 2021-08-19 RX ADMIN — CABERGOLINE 0.5 MG: 0.5 TABLET ORAL at 20:41

## 2021-08-19 RX ADMIN — LEVOTHYROXINE SODIUM 150 MCG: 0.07 TABLET ORAL at 07:51

## 2021-08-19 RX ADMIN — ENOXAPARIN SODIUM 40 MG: 40 INJECTION SUBCUTANEOUS at 09:56

## 2021-08-19 RX ADMIN — CEFUROXIME AXETIL 500 MG: 500 TABLET ORAL at 20:41

## 2021-08-19 RX ADMIN — ROSUVASTATIN CALCIUM 40 MG: 40 TABLET, FILM COATED ORAL at 20:41

## 2021-08-19 RX ADMIN — Medication 10 ML: at 22:28

## 2021-08-19 RX ADMIN — SODIUM CHLORIDE: 9 INJECTION, SOLUTION INTRAVENOUS at 20:42

## 2021-08-19 RX ADMIN — LORAZEPAM 1 MG: 2 INJECTION INTRAMUSCULAR; INTRAVENOUS at 09:57

## 2021-08-19 RX ADMIN — NAPROXEN 500 MG: 500 TABLET ORAL at 20:41

## 2021-08-19 RX ADMIN — SODIUM CHLORIDE: 9 INJECTION, SOLUTION INTRAVENOUS at 23:34

## 2021-08-19 RX ADMIN — ASPIRIN 81 MG: 81 TABLET, COATED ORAL at 09:57

## 2021-08-19 RX ADMIN — CEFUROXIME AXETIL 500 MG: 500 TABLET ORAL at 13:58

## 2021-08-19 RX ADMIN — SODIUM CHLORIDE: 9 INJECTION, SOLUTION INTRAVENOUS at 07:51

## 2021-08-19 RX ADMIN — NAPROXEN 500 MG: 500 TABLET ORAL at 09:57

## 2021-08-19 RX ADMIN — HYDROCHLOROTHIAZIDE 25 MG: 25 TABLET ORAL at 09:57

## 2021-08-19 ASSESSMENT — PAIN SCALES - GENERAL
PAINLEVEL_OUTOF10: 0
PAINLEVEL_OUTOF10: 6
PAINLEVEL_OUTOF10: 0
PAINLEVEL_OUTOF10: 9

## 2021-08-19 NOTE — PROGRESS NOTES
[facility]   Facility/Department: [facility]  Speech Language Pathology    Yoshi Sorto  : 1975  ROOM: [id number]/[id number]      DATE: 2021      This patient is currently in Observation/Outpatient Status. Due to Medicare, other insurance and Hospital Guidelines, a written order with a therapy specific diagnosis (dysphagia, dysarthria, aphasia) must be attached to the order before we can initiate the evaluation. Re-order speech therapy with the appropriate diagnosis, as needed. Discussed with Babak FRANCIS.     Thank you,  Ab Arana, SLP, CCC-SLP, Date: 2021, Time: 8:01 AM

## 2021-08-19 NOTE — FLOWSHEET NOTE
Pt assisted to bathroom during shift change report, tearful, refusing to go to mri until after bkfst, order obtained for medications for her claustraphobia, pt angry re; npo status pending swallow, diet orders placed, cont to monitor Electronically signed by Mickie Narayan RN on 8/19/2021 at 8:25 AM   Pt calm, ate bkfst, medicated and on cart for mri, purse placed in pt closet, asked to leave phone on bed Electronically signed by Mickie Narayan RN on 8/19/2021 at 10:05 AM

## 2021-08-19 NOTE — PROGRESS NOTES
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 No tunnel vision. Nontraumatic rhabdomyolysis-likely due to cocaine abuse  Hypertension  Hypothyroidism     Plan  Observe on telemetry neuro floor  Resume aspirin, statin, obtain MRI of the brain and MRA of the head, carotid ultrasound, echocardiogram, neurology consult, PT/OT per stroke protocol, neurochecks per stroke protocol  SLP eval  Endocrine consult for pituitary macroadenoma  Home meds resumed  IVF, recheck CK in the morning  Educated on cocaine abuse  DVT prophylaxis  Full code    Subjective:      no new events.      Physical Examination:      Vitals:  /85   Pulse 54   Temp 98.4 °F (36.9 °C) (Oral)   Resp 19   Ht 5' 2\" (1.575 m)   Wt 148 lb (67.1 kg)   LMP 2019 (Exact Date)   SpO2 98%   BMI 27.07 kg/m²   Temp (24hrs), Av.6 °F (37 °C), Min:97.9 °F (36.6 °C), Max:99 °F (37.2 °C)      General appearance: alert, cooperative and no distress  Mental Status: oriented to person, place and time and normal affect  Lungs: clear to auscultation bilaterally, normal effort  Heart: regular rate and rhythm, no murmur  Abdomen: soft, nontender, nondistended, bowel sounds present, no masses  Extremities: no edema, redness, tenderness in the calves  Skin: no gross lesions, rashes    Data:     Labs:  Recent Labs     21  1430 21  0610   WBC 10.9* 8.8   HGB 16.8* 14.7    324     Recent Labs     21  1445      K 4.3   CL 99   CO2 30   BUN 10   CREATININE 1.11*   GLUCOSE 74     Recent Labs 08/18/21  1445   AST 53*   ALT 29   BILITOT 0.3   ALKPHOS 56       Current Facility-Administered Medications   Medication Dose Route Frequency Provider Last Rate Last Admin    cefUROXime (CEFTIN) tablet 500 mg  500 mg Oral 2 times per day Ave Koyanagi, DO   500 mg at 08/19/21 1358    levothyroxine (SYNTHROID) tablet 150 mcg  150 mcg Oral Daily Ave Koyanagi, DO   150 mcg at 08/19/21 0751    hydroCHLOROthiazide (HYDRODIURIL) tablet 25 mg  25 mg Oral Daily Ave Koyanagi, DO   25 mg at 08/19/21 0957    naproxen (NAPROSYN) tablet 500 mg  500 mg Oral BID Ave Koyanagi, DO   500 mg at 08/19/21 0957    sodium chloride flush 0.9 % injection 5-40 mL  5-40 mL Intravenous 2 times per day Ave Koyanagi, DO   10 mL at 08/19/21 0957    sodium chloride flush 0.9 % injection 5-40 mL  5-40 mL Intravenous PRN Ave Koyanagi, DO        0.9 % sodium chloride infusion  25 mL Intravenous PRN Ave Koyanagi, DO        ondansetron (ZOFRAN-ODT) disintegrating tablet 4 mg  4 mg Oral Q8H PRN Mae Charu Sammy, DO        Or    ondansetron [facility] injection 4 mg  4 mg Intravenous Q6H PRN Mae Charu Sammy, DO        polyethylene glycol (GLYCOLAX) packet 17 g  17 g Oral Daily PRN Mae Charu Sammy, DO        enoxaparin (LOVENOX) injection 40 mg  40 mg Subcutaneous Daily Ave Koyanagi, DO   40 mg at 08/19/21 0956    aspirin EC tablet 81 mg  81 mg Oral Daily Ave Koyanagi, DO   81 mg at 08/19/21 0957    Or    aspirin suppository 300 mg  300 mg Rectal Daily Ave Koyanagi, DO        rosuvastatin (CRESTOR) tablet 40 mg  40 mg Oral Nightly Ave Koyanagi, DO   40 mg at 08/18/21 2119    0.9 % sodium chloride infusion   Intravenous Continuous Ave Koyanagi,  mL/hr at 08/19/21 0751 New Bag at 08/19/21 8312       Additional work up or/and treatment plan may be added today or then after based on clinical progression. I am managing a portion of pt care. Some medical issues are handled by other specialists.  Additional work up and treatment should be done in out pt setting by pt PCP and other out pt providers. In addition to examining and evaluating pt, I spent additional time explaining care, normaland abnormal findings, and treatment plan. All of pt questions were answered. Counseling, diet and education were provided. Case will be discussed with nursing staff when appropriate. Family will be updated if and when appropriate.        Electronically signed by Kvng Marina DO on 8/19/2021 at 2:13 PM

## 2021-08-19 NOTE — PROGRESS NOTES
Physical Therapy Med Surg Initial Assessment  Facility/Department: [facility]  Room: [place] [address]       NAME: Mireya Umaña  : 1975 (55 y.o.)  MRN: [id number]  CODE STATUS: Full Code    Date of Service: 2021    Patient Diagnosis(es): Ataxic gait [R26.0]  Paresthesia [R20.2]  Nondependent cocaine abuse ([address] [F14.10]  Stroke-like episode [R29.90]   Chief Complaint   Patient presents with    Numbness     right side     Patient Active Problem List    Diagnosis Date Noted    Stroke-like episode 2021        Past Medical History:   Diagnosis Date    Hypertension     Thyroid disease      Past Surgical History:   Procedure Laterality Date    CHOLECYSTECTOMY      TUBAL LIGATION         Chart Reviewed: Yes  Patient assessed for rehabilitation services?: Yes  Family / Caregiver Present: No  General Comment  Comments: Pt laying in bed, agreeable to PT eval    Restrictions:  Restrictions/Precautions: Fall Risk (high fall risk per Chance score)     SUBJECTIVE: Subjective: Pt denies pain currently. States that \"Whole right side is numb and hands are puffy. \"    Pain  Pre Treatment Pain Screening  Pain at present: 0    Post Treatment Pain Screening:   Pain Screening  Patient Currently in Pain: Yes  Pain Assessment  Pain Assessment: 0-10  Pain Level: 0    Prior Level of Function:  Social/Functional History  Lives With: Alone  Type of Home: House  Home Layout: One level  Home Access: Stairs to enter with rails  Entrance Stairs - Number of Steps: 4  Entrance Stairs - Rails: Right  Bathroom Shower/Tub: Tub/Shower unit  Bathroom Equipment:  (none)  Home Equipment:  (none)  ADL Assistance: Independent  Homemaking Assistance: Independent  Homemaking Responsibilities: Yes  Ambulation Assistance: Independent (no AD)  Transfer Assistance: Independent  Active : No  Patient's  Info: Sister or insurance    OBJECTIVE:   Vision: Impaired  Vision Exceptions: Wears glasses at all times; Wears glasses for reading  Hearing: Within functional limits    Cognition:  Overall Orientation Status: Within Functional Limits  Orientation Level: Oriented to situation, Oriented to time, Oriented to person, Oriented to place  Follows Commands: Within Functional Limits    Observation/Palpation  Posture: Good  Observation: pleasant,cooperative, flat affect, no acute distress noted, on RA, IV running    ROM:  RLE AROM: WFL  LLE AROM : WFL    Strength:  Strength RLE  Comment: hip 3+/5 , knee4-/5, DF 4-/5, PF 4/5  Strength LLE  Comment: hip 4-/5, knee and ankle 4+/5    Neuro:  Balance  Sitting - Static: Good  Sitting - Dynamic: Good  Standing - Static: Good;-  Standing - Dynamic: Good;-     Motor Control  Gross Motor?: Exceptions  Comments: R LE weakness  Sensation  Overall Sensation Status: Impaired (Pt reports R sided numbness \"everywhere\")    Bed mobility  Supine to Sit: Modified independent  Sit to Supine: Modified independent  Comment: HOB elevated for pt comfort, demonstrated no difficulty    Transfers  Sit to Stand: Supervision  Stand to sit: Supervision    Ambulation  Ambulation?: Yes  Ambulation 1  Surface: level tile  Device: No Device  Assistance: Stand by assistance  Quality of Gait: antalgic, decreased stance time noted on R LE, lurching pattern, icnreased double limb support time  Gait Deviations: Slow Talya  Distance: 30ft x 2  Comments: pt reports she is indep with ambulation without present gait deviations. VC to improve safety. Use of call light for assistance    Activity Tolerance  Activity Tolerance: Patient Tolerated treatment well      PT Education  PT Education: Goals;PT Role;Plan of Care;General Safety  Patient Education: call light for safety, fall risk precautions    ASSESSMENT:   Body structures, Functions, Activity limitations: Decreased functional mobility ; Decreased balance;Decreased strength  Decision Making: Medium Complexity  History: med  Exam: med  Clinical Presentation: med    Specific instructions for Next Treatment: ashish BURRELL give HEP  Prognosis: Good  Patient Education: call light for safety, fall risk precautions  Barriers to Learning: none    DISCHARGE RECOMMENDATIONS:  Discharge Recommendations: Continue to assess pending progress    Assessment: Pt demonstrates the above deficits and decline in functional mobility status placing them at increased risk for falls. Pt reports numbness on \"whole right side\" and demonstrated R sided strength deficits. Pt would benefit from physical therapy to address above deficits and allow for safe return home at highest level of function, decrease risk for falls, and improve QOL. REQUIRES PT FOLLOW UP: Yes      PLAN OF CARE:  Plan  Times per week: 3-6  Specific instructions for Next Treatment: ashish BURRELL give HEP  Current Treatment Recommendations: Strengthening, Functional Mobility Training, Manual Therapy - Joint Manipulation, Equipment Evaluation, Education, & procurement, Home Exercise Program, Safety Education & Training, Modalities, Gait Training, Transfer Training, Balance Training, Stair training, Patient/Caregiver Education & Training, Positioning  Safety Devices  Type of devices: Left in bed, Call light within reach, Bed alarm in place    Goals:  Patient goals : \"to walk better\"  Long term goals  Long term goal 1: Bed mobility with indep  Long term goal 2: Functional transfers with indep  Long term goal 3: Amb 150ft with indep  Long term goal 4: 4 steps with handrail and indep  Long term goal 5: BURRELL >41/56 to demonstrate decreased fall risk    [facility] (6 CLICK) [address] Inpatient Mobility Raw Score : 17     Therapy Time:   Individual   Time In 1608   Time Out 1623   Minutes 100 [place], [place], 08/19/21 at 4:35 PM       Definitions for assistance levels  Independent = pt does not require any physical supervision or assistance from another person for activity completion. Device may be needed.   Stand by assistance = pt requires verbal cues or instructions from another person, close to but not touching, to perform the activity  Minimal assistance= pt performs 75% or more of the activity; assistance is required to complete the activity  Moderate assistance= pt performs 50% of the activity; assistance is required to complete the activity  Maximal assistance = pt performs 25% of the activity; assistance is required to complete the activity  Dependent = pt requires total physical assistance to accomplish the task

## 2021-08-19 NOTE — PROGRESS NOTES
2115 axox4 follows commands, c/o headache, noted right sided weakness, also staes right sided numbness/ tingling

## 2021-08-20 LAB
CK MB: 2.4 NG/ML (ref 0–3.8)
CORTISOL TOTAL: 17.3 UG/DL
CORTISOL TOTAL: 25.8 UG/DL
CORTISOL TOTAL: 30.6 UG/DL
CREATINE KINASE-MB INDEX: 0.8 % (ref 0–3.5)
TOTAL CK: 288 U/L (ref 0–170)
URINE CULTURE, ROUTINE: NORMAL

## 2021-08-20 PROCEDURE — 1210000000 HC MED SURG R&B

## 2021-08-20 PROCEDURE — APPSS45 APP SPLIT SHARED TIME 31-45 MINUTES: Performed by: STUDENT IN AN ORGANIZED HEALTH CARE EDUCATION/TRAINING PROGRAM

## 2021-08-20 PROCEDURE — 36415 COLL VENOUS BLD VENIPUNCTURE: CPT

## 2021-08-20 PROCEDURE — 96372 THER/PROPH/DIAG INJ SC/IM: CPT

## 2021-08-20 PROCEDURE — 2580000003 HC RX 258: Performed by: INTERNAL MEDICINE

## 2021-08-20 PROCEDURE — 92610 EVALUATE SWALLOWING FUNCTION: CPT

## 2021-08-20 PROCEDURE — 82553 CREATINE MB FRACTION: CPT

## 2021-08-20 PROCEDURE — 96375 TX/PRO/DX INJ NEW DRUG ADDON: CPT

## 2021-08-20 PROCEDURE — 6370000000 HC RX 637 (ALT 250 FOR IP): Performed by: INTERNAL MEDICINE

## 2021-08-20 PROCEDURE — 99254 IP/OBS CNSLTJ NEW/EST MOD 60: CPT | Performed by: PSYCHIATRY & NEUROLOGY

## 2021-08-20 PROCEDURE — 82533 TOTAL CORTISOL: CPT

## 2021-08-20 PROCEDURE — 82550 ASSAY OF CK (CPK): CPT

## 2021-08-20 PROCEDURE — 6360000002 HC RX W HCPCS: Performed by: INTERNAL MEDICINE

## 2021-08-20 PROCEDURE — 97166 OT EVAL MOD COMPLEX 45 MIN: CPT

## 2021-08-20 PROCEDURE — 97116 GAIT TRAINING THERAPY: CPT

## 2021-08-20 PROCEDURE — 99232 SBSQ HOSP IP/OBS MODERATE 35: CPT | Performed by: INTERNAL MEDICINE

## 2021-08-20 RX ORDER — NICOTINE 21 MG/24HR
1 PATCH, TRANSDERMAL 24 HOURS TRANSDERMAL DAILY
Status: DISCONTINUED | OUTPATIENT
Start: 2021-08-20 | End: 2021-08-21 | Stop reason: HOSPADM

## 2021-08-20 RX ORDER — LEVOTHYROXINE SODIUM 0.12 MG/1
250 TABLET ORAL DAILY
Status: DISCONTINUED | OUTPATIENT
Start: 2021-08-21 | End: 2021-08-21 | Stop reason: HOSPADM

## 2021-08-20 RX ADMIN — NAPROXEN 500 MG: 500 TABLET ORAL at 08:47

## 2021-08-20 RX ADMIN — Medication 10 ML: at 21:00

## 2021-08-20 RX ADMIN — ROSUVASTATIN CALCIUM 40 MG: 40 TABLET, FILM COATED ORAL at 21:00

## 2021-08-20 RX ADMIN — NAPROXEN 500 MG: 500 TABLET ORAL at 21:00

## 2021-08-20 RX ADMIN — CEFUROXIME AXETIL 500 MG: 500 TABLET ORAL at 20:59

## 2021-08-20 RX ADMIN — COSYNTROPIN 250 MCG: 0.25 INJECTION, POWDER, LYOPHILIZED, FOR SOLUTION INTRAMUSCULAR; INTRAVENOUS at 08:54

## 2021-08-20 RX ADMIN — CEFUROXIME AXETIL 500 MG: 500 TABLET ORAL at 08:47

## 2021-08-20 RX ADMIN — HYDROCHLOROTHIAZIDE 25 MG: 25 TABLET ORAL at 08:47

## 2021-08-20 RX ADMIN — SODIUM CHLORIDE: 9 INJECTION, SOLUTION INTRAVENOUS at 10:51

## 2021-08-20 RX ADMIN — ASPIRIN 81 MG: 81 TABLET, COATED ORAL at 08:47

## 2021-08-20 RX ADMIN — LEVOTHYROXINE SODIUM 150 MCG: 0.07 TABLET ORAL at 08:47

## 2021-08-20 RX ADMIN — ENOXAPARIN SODIUM 40 MG: 40 INJECTION SUBCUTANEOUS at 08:46

## 2021-08-20 ASSESSMENT — PAIN DESCRIPTION - LOCATION: LOCATION: HEAD;BACK

## 2021-08-20 ASSESSMENT — ENCOUNTER SYMPTOMS
TROUBLE SWALLOWING: 0
NAUSEA: 0
ALLERGIC/IMMUNOLOGIC NEGATIVE: 1
SHORTNESS OF BREATH: 0
DIARRHEA: 0
PHOTOPHOBIA: 0
CHEST TIGHTNESS: 0
VOMITING: 0

## 2021-08-20 ASSESSMENT — PAIN SCALES - GENERAL
PAINLEVEL_OUTOF10: 6
PAINLEVEL_OUTOF10: 4
PAINLEVEL_OUTOF10: 6

## 2021-08-20 ASSESSMENT — PAIN DESCRIPTION - DESCRIPTORS: DESCRIPTORS: ACHING

## 2021-08-20 ASSESSMENT — PAIN DESCRIPTION - ORIENTATION: ORIENTATION: LOWER

## 2021-08-20 NOTE — PLAN OF CARE
Problem: Falls - Risk of:  Goal: Will remain free from falls  Description: Will remain free from falls  Outcome: Ongoing  Goal: Absence of physical injury  Description: Absence of physical injury  Outcome: Ongoing     Problem: HEMODYNAMIC STATUS  Goal: Patient has stable vital signs and fluid balance  Outcome: Ongoing     Problem: ACTIVITY INTOLERANCE/IMPAIRED MOBILITY  Goal: Mobility/activity is maintained at optimum level for patient  Outcome: Ongoing
Problem: Pain:  Goal: Pain level will decrease  Outcome: Ongoing  Goal: Control of acute pain  Outcome: Ongoing  Goal: Control of chronic pain  Outcome: Ongoing     Problem: Falls - Risk of:  Goal: Will remain free from falls  Outcome: Ongoing  Goal: Absence of physical injury  Outcome: Ongoing     Problem: HEMODYNAMIC STATUS  Goal: Patient has stable vital signs and fluid balance  Outcome: Ongoing     Problem: ACTIVITY INTOLERANCE/IMPAIRED MOBILITY  Goal: Mobility/activity is maintained at optimum level for patient  Outcome: Ongoing     Problem: COMMUNICATION IMPAIRMENT  Goal: Ability to express needs and understand communication  Outcome: Ongoing     Problem: IP MOBILITY  Goal: LTG - patient will demonstrate safe mobility requirements  Outcome: Ongoing     Problem: IP DRESSINGS LOWER EXTREMITIES  Goal: LTG - patient will dress lower body with or without assistive device  Outcome: Ongoing
Therapy evaluation completed. Please see daily notes and/or progress notes for details related to planned treatment interventions, goals and functional performance.
5

## 2021-08-20 NOTE — PROGRESS NOTES
3621 assessment as stated, denies pain at this time, IV fluids infusing without difficulty, denies any needs at present

## 2021-08-20 NOTE — PROGRESS NOTES
[facility]   Facility/Department: [facility]  Speech Language Pathology  Clinical Bedside Swallow Evaluation    Christopher Bell  : 1975 (79 y.o.)   MRN: [id number]  [id number]  ADMISSION DATE: 2021  PATIENT DIAGNOSIS(ES): Ataxic gait [R26.0]  Paresthesia [R20.2]  Nondependent cocaine abuse ([facility] [address] [F14.10]  Stroke-like episode [R29.90]  Chief Complaint   Patient presents with    Numbness     right side     Patient Active Problem List    Diagnosis Date Noted    Pituitary adenoma ([facility]     Hypothyroidism     Hyperprolactinemia ([facility]     Stroke-like episode 2021     Past Medical History:   Diagnosis Date    Hypertension     Thyroid disease      Past Surgical History:   Procedure Laterality Date    CHOLECYSTECTOMY      TUBAL LIGATION       No Known Allergies    DATE ONSET: 2021    Date of Evaluation: 2021   Evaluating Therapist: LUC Prado    Recommended Diet and Intervention  Diet Solids Recommendation: Regular  Liquid Consistency Recommendation: Thin  Recommended Form of Meds: PO     Compensatory Swallowing Strategies  Compensatory Swallowing Strategies: Upright as possible for all oral intake;Small bites/sips    Reason for Referral  Izabella Fields was referred for a bedside swallow evaluation to assess the efficiency of her swallow function, identify signs and symptoms of aspiration and make recommendations regarding safe dietary consistencies, effective compensatory strategies, and safe eating environment. General  Subjective  Subjective: Patient reports that right side of body is swollen, including her tongue and jaw. Patient denies difficulty swallowing. Behavior/Cognition: Alert; Cooperative;Pleasant mood  Respiratory Status: Room air  O2 Device: None (Room air)  Communication Observation: Functional  Dentition: Some missing teeth  Patient Positioning: Upright in bed  Baseline Vocal Quality: Normal  Prior Dysphagia History: Patient denies hx of dysphagia  Consistencies Administered: Reg solid; Dysphagia Pureed (Dysphagia I); Thin    Current Diet level:  Current Diet : Regular  Current Liquid Diet : Thin    Oral Motor Deficits  Oral/Motor  Oral Motor: Within functional limits    Oral Phase Dysfunction  Oral Phase  Oral Phase: WFL  Oral Phase  Oral Phase - Comment: Patient presents with functional oral phase of the swallow. Indicators of Pharyngeal Phase Dysfunction   Pharyngeal Phase  Pharyngeal Phase: WFL  Pharyngeal Phase   Pharyngeal: Patient presents with functional pharyngeal phase of the swallow. Impression  Dysphagia Diagnosis: Swallow function appears grossly intact  Dysphagia Impression : Patient presents with functional oral and pharyngeal phase of the swallow. Patient reports swollen tongue and jaw, which causes her to masticate more slowly. Patient demonstrated adequate mastication and oral clearance as well as a timely swallow onset. No s/s of aspiration were observed. Dysphagia Outcome Severity Scale: Level 7: Normal in all situations     Treatment Plan  Requires SLP Intervention: No  Duration/Frequency of Treatment: N/A        Prognosis  Individuals consulted  Consulted and agree with results and recommendations: Patient;RN (PENNY cervantes)    Education  Patient Education: Patient educated on results of BSE  Patient Education Response: Verbalizes understanding  Safety Devices in place: Yes  Type of devices: Bed alarm in place;Call light within reach    Pain Assessment:  Pre-Treatment  Pain assessment: 0-10  Pain level: 0  Intervention:  Patient denies pain. Post-Treatment  Pain assessment: 0-10  Pain level: 0  Intervention:  Patient denies pain.          Therapy Time  SLP Individual Minutes  Time In: 1030  Time Out: 1046  Minutes: 16          Signature: Electronically signed by LUC Edge on 8/20/2021 at 11:37 AM

## 2021-08-20 NOTE — PROGRESS NOTES
[facility]   Facility/Department: [facility]  Speech Language Pathology    Alverto Payment  : 1975  ROOM: [id number]      DATE: 2021      This patient is currently in Observation/Outpatient Status. Due to Medicare, other insurance and Hospital Guidelines, a written order with a therapy specific diagnosis (dysphagia, dysarthria, aphasia) must be attached to the order before we can initiate the evaluation. Re-order speech therapy with the appropriate diagnosis, as needed. Discussed with Julia Castro.     Thank you,  Catherine Grier, SLP, CCC-SLP, Date: 2021, Time: 8:24 AM

## 2021-08-20 NOTE — PROGRESS NOTES
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 No tunnel vision. Severe hypothyroidism-  Nontraumatic rhabdomyolysis-likely due to cocaine abuse  Hypertension  Hypothyroidism     Plan  , T4 is low. Started on Synthroid by endocrine. Educated on the importance of taking her Synthroid at home. Cosyntropin stim test negative, appropriate response. MRI and MRA of the brain negative for stroke  Echocardiogram pending  Okay to discharge by neurology pending endocrine recommendations  Started on cabergoline for hyperprolactinemia  SLP eval  Endocrine consult for pituitary macroadenoma  Home meds resumed  IVF, recheck CK in the morning  Educated on cocaine abuse  DVT prophylaxis  Full code    Dispo-anticipate discharge home tomorrow. Pending endocrine and PT. Subjective:      no new events. Denies any new symptoms.     Physical Examination:      Vitals:  /81   Pulse 65   Temp 97.9 °F (36.6 °C) (Oral)   Resp 18   Ht 5' 2\" (1.575 m)   Wt 148 lb (67.1 kg)   LMP 2019 (Exact Date)   SpO2 99%   BMI 27.07 kg/m²   Temp (24hrs), Av °F (36.7 °C), Min:97.9 °F (36.6 °C), Max:98.1 °F (36.7 °C)      General appearance: alert, cooperative and no distress  Mental Status: oriented to person, place and time and normal affect  Lungs: clear to auscultation bilaterally, normal effort  Heart: regular rate and rhythm, no murmur  Abdomen: soft, nontender, nondistended, bowel sounds present, no masses  Extremities: no edema, redness, tenderness in the calves  Skin: no gross lesions, rashes  Neurologic exam nonfocal, strength intact throughout, she has generalized weakness.     Data:     Labs:  Recent Labs     08/18/21  1430 08/19/21  0610   WBC 10.9* 8.8   HGB 16.8* 14.7    324     Recent Labs     08/18/21  1445      K 4.3   CL 99   CO2 30   BUN 10   CREATININE 1.11*   GLUCOSE 74     Recent Labs     08/18/21  1445   AST 53*   ALT 29   BILITOT 0.3   ALKPHOS 56       Current Facility-Administered Medications   Medication Dose Route Frequency Provider Last Rate Last Admin    nicotine (NICODERM CQ) 14 MG/24HR 1 patch  1 patch Transdermal Daily Kala Vu, DO   1 patch at 08/20/21 1530    cefUROXime (CEFTIN) tablet 500 mg  500 mg Oral 2 times per day Kala Vu, DO   500 mg at 08/20/21 0847    cabergoline (DOSTINEX) tablet 0.5 mg  0.5 mg Oral Once per day on Mon Thu Ilir Larson MD   0.5 mg at 08/19/21 2041    levothyroxine (SYNTHROID) tablet 150 mcg  150 mcg Oral Daily Kala Vu, DO   150 mcg at 08/20/21 0847    hydroCHLOROthiazide (HYDRODIURIL) tablet 25 mg  25 mg Oral Daily Kala Vu, DO   25 mg at 08/20/21 0847    naproxen (NAPROSYN) tablet 500 mg  500 mg Oral BID Kala Vu, DO   500 mg at 08/20/21 0847    sodium chloride flush 0.9 % injection 5-40 mL  5-40 mL Intravenous 2 times per day Kala Vu, DO   10 mL at 08/19/21 2228    sodium chloride flush 0.9 % injection 5-40 mL  5-40 mL Intravenous PRN Kala Vu, DO        0.9 % sodium chloride infusion  25 mL Intravenous PRN Kala Vu, DO   Stopped at 08/20/21 1246    ondansetron (ZOFRAN-ODT) disintegrating tablet 4 mg  4 mg Oral Q8H PRN Kala Vu, DO        Or    ondansetron [facility]) injection 4 mg  4 mg Intravenous Q6H PRN Arjun Royalty Sammy, DO        polyethylene glycol (GLYCOLAX) packet 17 g  17 g Oral Daily PRN Arjun Royalty Sammy, DO        enoxaparin (LOVENOX) injection 40 mg  40 mg Subcutaneous Daily Kala Vu DO   40 mg at 08/20/21 0846    aspirin EC tablet 81 mg  81 mg Oral Daily Narda Gonzales, DO   81 mg at 08/20/21 7323    Or    aspirin suppository 300 mg  300 mg Rectal Daily Narda Gonzales,         rosuvastatin (CRESTOR) tablet 40 mg  40 mg Oral Nightly Narda Espinozaes, DO   40 mg at 08/19/21 2041       Additional work up or/and treatment plan may be added today or then after based on clinical progression. I am managing a portion of pt care. Some medical issues are handled by other specialists. Additional work up and treatment should be done in out pt setting by pt PCP and other out pt providers. In addition to examining and evaluating pt, I spent additional time explaining care, normaland abnormal findings, and treatment plan. All of pt questions were answered. Counseling, diet and education were provided. Case will be discussed with nursing staff when appropriate. Family will be updated if and when appropriate.        Electronically signed by Narda Gonzales DO on 8/20/2021 at 4:37 PM

## 2021-08-20 NOTE — FLOWSHEET NOTE
PM medication given tolerated well denies any discomfort. Electronically signed by Aletha Hobbs LPN on 6/42/2838 at 56:43 PM

## 2021-08-20 NOTE — CONSULTS
[address]                      [address], [address] [facility]                                  CONSULTATION    PATIENT NAME: Young Simpson                     :        1975  MED REC NO:   [id number]                            ROOM:       W286  ACCOUNT NO:   [de-identified]                           ADMIT DATE: 2021  PROVIDER:     Ilir Larson MD    CONSULT DATE:  2021    ENDOCRINOLOGY CONSULTATION    REFERRING PROVIDER:  Harvey Nageotte, DO    REASON FOR CONSULTATION:  Pituitary macroadenoma. CHIEF COMPLAINT AND HISTORY OF PRESENT ILLNESS:  The patient is a  66-year-old female who was [place] because of  numbness of her right side, history of hypothyroidism with  noncompliance, history of pituitary adenoma, cocaine abuse,  hypertension, presenting with right-sided numbness. Initial admitting  diagnosis was CVA, leg symptoms, gait ataxia, nontraumatic  rhabdomyolysis, possible cocaine use. The patient has been evaluated in  the past for her pituitary macroadenoma. Her labs were reviewed showing prolactin level of 105. FSH was elevated  at 70. LH was 4.3.  TSH was at 780. Free T4 was 0.2. The patient  admits to noncompliance with her levothyroxine. Her cortisol level was  ordered, 4.8. The patient does complain of amenorrhea. Denies any  galatorrhea. Does complain of headache. Denies any peripheral vision  loss or double vision. MRI of the brain revealed 11 x 12 x 16 mm  pituitary macroadenoma compared to MRI from [facility] two years ago. Size  is comparable with some extension abutting optic chiasma. The patient's  other labs were reviewed. CPK was 461. Sodium 139, potassium 4.3,  chloride was 99, CO2 was 30, BUN 10, creatinine 1.1. CBC was showing  WBC count was 10.9, hemoglobin was 16.8. PAST MEDICAL HISTORY:  Significant for hypothyroidism, pituitary  macroadenoma, hypertension.     PAST SURGICAL HISTORY:  Tubal ligation, , cholecystectomy. FAMILY HISTORY:  Reviewed and noncontributory. PERSONAL AND SOCIAL HISTORY:  Currently does smoke cigarettes, does use  alcohol and cocaine. CURRENT MEDICATIONS:  Meds here include Synthroid 150 mcg daily,  Lovenox, Ceftin, naproxen, Crestor, normal saline at 100 mL an hour. ALLERGIES:  None. REVIEW OF SYSTEMS:  Other than headaches, amenorrhea and right-sided  weakness, a 14-point review of systems is negative. PHYSICAL EXAMINATION:  GENERAL:  The patient is alert, awake, no obvious distress. VITAL SIGNS:  Blood pressure was 114/77, pulse rate was 70,  temperature  97.9. HEENT:  Normocephalic and atraumatic. Pupils equal, reacting to light. Oral mucosa was moist.  NECK:  Supple. Trachea in midline. CHEST:  Lungs were clear to auscultation bilaterally. No wheezing or  crackles were heard. CARDIOVASCULAR:  Heart sounds were normal.  No murmurs or thrills were  present. ABDOMEN:  Soft, nonobese. Bowel sounds are present. No organomegaly or  tenderness. EXTREMITIES:  Lower extremities reveal no edema. NEUROLOGIC:  Cranial nerves I through XII were intact. PSYCHIATRIC:  Normal affect and cognition. Depressed affect. MUSCULOSKELETAL:  No joint swelling. SKIN:  Intact. LABORATORY DATA:  Labs as above. Cortisol level was low at 4.8. ASSESSMENT:  Pituitary macroadenoma, stable. Shows low cortisol, rule  out any adrenal insufficiency, severe hypothyroidism, noncompliance,  history of substance abuse, rhabdomyolysis. PLAN:  Get ACTH stimulation test.  Start the patient on Dostinex 0.5 mg  twice for hyperprolactinemia. Plan to start on Dostinex 0.5 mg twice a  week. Continue Synthroid 150 mcg daily, ordered [address] [place] [address] LH growth hormone,  ACTH level. Get ACTH stimulation test.  Continue other supportive  measures. Advised compliance. Thank you for the consult. Total time spent was eighty minutes.         Jace Oquendo MD    D: 2021 23:33:19       T: 08/19/2021 23:36:26     [email or web address]  Job#: [id number]     Doc#: [id number]    CC:

## 2021-08-20 NOTE — PROGRESS NOTES
[facility]ALUATION - ACUTE     NAME: Niko Christy  : 1975 (55 y.o.)  MRN: [id number]  CODE STATUS: Full Code  [facility]    Date of Service: 2021    Patient Diagnosis(es): Ataxic gait [R26.0]  Paresthesia [R20.2]  Nondependent cocaine abuse ([facility] [address] [F14.10]  Stroke-like episode [R29.90]   Chief Complaint   Patient presents with    Numbness     right side     Patient Active Problem List    Diagnosis Date Noted    Pituitary adenoma ([facility] [address]     Hypothyroidism     Hyperprolactinemia ([facility] [facility]     Stroke-like episode 2021        Past Medical History:   Diagnosis Date    Hypertension     Thyroid disease      Past Surgical History:   Procedure Laterality Date    CHOLECYSTECTOMY      TUBAL LIGATION          Restrictions:Fall,IV        Safety Devices: Safety Devices  Safety Devices in place: Yes  Type of devices: Call light within reach   Initially in place: No    Subjective:Pt seen with sister present. \"You are wearing me out. \"       Pain Reassessment: 0/10 pain reported.           Prior Level of Function:  Social/Functional History  Lives With: Alone  Type of Home: House  Home Layout: One level  Home Access: Stairs to enter with rails  Entrance Stairs - Number of Steps: 4  Entrance Stairs - Rails: Right  Bathroom Shower/Tub: Tub/Shower unit  Bathroom Equipment:  (none)  Home Equipment:  (none)  ADL Assistance: Independent  Homemaking Assistance: Independent  Homemaking Responsibilities: Yes  Ambulation Assistance: Independent (no AD)  Transfer Assistance: Independent  Active : No  Patient's  Info: Sister or insurance    OBJECTIVE:     Orientation Status:  Orientation  Overall Orientation Status: Within Functional Limits (Pt utilized visual cues in room)    Observation:  Observation/Palpation  Posture: Good  Edema: MIld bilateral UE    Cognition Status:  Cognition  Cognition Comment: follows one step commands consistently    Perception Stand by assistance  Stand to sit: Stand by assistance    Bed Mobility  Bed mobility  Supine to Sit: Modified independent  Sit to Supine: Modified independent    Seated and Standing Balance:  Balance  Sitting Balance: Supervision  Standing Balance: Stand by assistance    Functional Endurance:  Activity Tolerance  Activity Tolerance: Patient limited by fatigue    D/C Recommendations:  OT D/C RECOMMENDATIONS  REQUIRES OT FOLLOW UP: Yes    Equipment Recommendations:       OT Education:        OT Follow Up:  OT D/C RECOMMENDATIONS  REQUIRES OT FOLLOW UP: Yes       Assessment/Discharge Disposition:     Performance deficits / Impairments: Decreased functional mobility , Decreased strength, Decreased endurance, Decreased ADL status, Decreased balance, Decreased fine motor control  Prognosis: Good  Discharge Recommendations: Continue to assess pending progress  Decision Making: Medium Complexity  History: 4 complexities  Exam: 6 deficits  Assistance / Modification: SBA    Six Click Score    How much help for putting on and taking off regular lower body clothing?: A Little  How much help for Bathing?: A Little  How much help for Toileting?: None  How much help for putting on and taking off regular upper body clothing?: A Little  How much help for taking care of personal grooming?: A Little  How much help for eating meals?: None  AM-[facility] Inpatient Daily Activity Raw Score: 20  AM-[facility] Inpatient ADL T-Scale Score : 42.03  ADL Inpatient CMS 0-100% Score: 38.32    Plan:  Plan  Times per week: 1-4x/wk  Current Treatment Recommendations: Strengthening, Endurance Training, Balance Training, Neuromuscular Re-education, Safety Education & Training, Self-Care / ADL    Goals:   Patient will:    - Improve functional endurance to tolerate/complete 10-20 mins of ADL's  - Be independent in UB ADLs   - Be indpendent in LB ADLs  - Be independent in ADL transfers without LOB  - Be independent in toileting tasks  - Improve right UE strength and endurance to FAir+ in order to participate in self-care activities as projected. Patient Goal: Patient goals :  To return to home when ready      Discussed and agreed upon: Yes Comments:     Therapy Time:   OT Individual Minutes  Time In: 1100  Time Out: 1114  Minutes: 14    Eval: 14 minutes     Electronically signed by:    BALBINA Olvera OTR/L  6/05/8923, 11:33 AM Electronically signed by BALBINA Olvera on 9/18/50 at 11:30 AM EDT

## 2021-08-20 NOTE — PROGRESS NOTES
Physical Therapy Med Surg Daily Treatment Note  [facility]  [facility]       NAME: Kary Tony  : 1975 (55 y.o.)  MRN: [id number]  CODE STATUS: Full Code    Date of Service: 2021    Patient Diagnosis(es): Ataxic gait [R26.0]  Paresthesia [R20.2]  Nondependent cocaine abuse ([facility] [address] [F14.10]  Stroke-like episode [R29.90]   Chief Complaint   Patient presents with    Numbness     right side     Patient Active Problem List    Diagnosis Date Noted    Pituitary adenoma ([facility] [address]     Hypothyroidism     Hyperprolactinemia ([facility]     Stroke-like episode 2021        Past Medical History:   Diagnosis Date    Hypertension     Thyroid disease      Past Surgical History:   Procedure Laterality Date    CHOLECYSTECTOMY      TUBAL LIGATION         Restrictions  Restrictions/Precautions: Fall Risk (high fall risk per Chance score)    SUBJECTIVE   General  Chart Reviewed: Yes  Family / Caregiver Present: No  Subjective  Subjective: \"I want clothes before I just go walking around here. \"    Pre-Session Pain Report  Pre Treatment Pain Screening  Pain at present: 4  Intervention List: Patient able to continue with treatment;Patient declined any intervention  Comments / Details: Pt reports receiving pain medication this AM  Pain Screening  Patient Currently in Pain: Yes       Post-Session Pain Report  Pain Assessment  Pain Assessment: 0-10  Pain Level: 4  Pain Location: Head;Back  Pain Orientation: Lower  Pain Descriptors: Aching         OBJECTIVE        Bed mobility  Supine to Sit: Modified independent  Sit to Supine: Modified independent  Comment: HOB flat, no use of HR. Pt comes to long sit vs rolling to side prior to getting OOB    Transfers  Sit to Stand: Supervision  Stand to sit: Supervision  Comment: Mild unsteadiness upon initial stand, pt able to self correct.     Ambulation  Ambulation?: Yes  More Ambulation?: No  Ambulation 1  Surface: level tile  Device: No more of the activity; assistance is required to complete the activity  Moderate assistance= pt performs 50% of the activity; assistance is required to complete the activity  Maximal assistance = pt performs 25% of the activity; assistance is required to complete the activity  Dependent = pt requires total physical assistance to accomplish the task

## 2021-08-20 NOTE — CONSULTS
[facility] Neurology Consult Note  Name: Bashir De  Age: 55 y.o. Gender: female  CodeStatus: Full Code  Allergies: No Known Allergies    Chief Complaint:Numbness (right side)    Primary Care Provider: Juan Syed MD  InpatientTreatment Team: Treatment Team: Attending Provider: Lupis Light DO; Consulting Physician: Collette Merrill MD; Consulting Physician: Makayla Samuels MD; : Yvonne Oropeza RN; Registered Nurse: Ralph Farley RN  Admission Date: 8/18/2021      HPI   Consulted by Dr. Maira Dixon for possible stroke    Nick Kulkarni is a 68-year-old female with a past medical history of hypothyroidism, pituitary adenoma, cocaine abuse, and hyper prolactinemia who presented to the ER on 8/18/2021 for a 1 week history of right-sided numbness, tingling, and gait ataxia. Ultrasound of the carotid arteries showed less than 50% stenosis bilaterally. MRI of the brain was negative for acute ischemia but did show faint nonspecific T2 hyperintensity in the subcortical white matter in the left frontal lobe involving the corona radiata. MRA of head was negative. Echo has not been completed. 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 Patient is lethargic upon arrival.  Reports that she has been feeling off balance, weak, and swollen for about 2 weeks. States that she ran out of her thyroid medication and therefore stopped taking it. Has not had any follow-up regarding her pituitary macroadenoma. Reports that she has had some blurred vision although she denies true visual field deficit. Patient denies temporal field cut, nausea, vomiting, difficulty swallowing, fever, chills, seizures, or positional headache. Damage noted above we were consulted from the emergency room regarding the same. Very complicated history which I had to discussed with  this morning.   Patient is very noncompliant and has known pituitary adenoma for 2 years and she is hypothyroid for many years    No Known Allergies    Patient Active Problem List   Diagnosis    Stroke-like episode    Pituitary adenoma ([facility]    Hypothyroidism    Hyperprolactinemia ([facility]       Past Medical History:   Diagnosis Date    Hypertension     Thyroid disease        History reviewed. No pertinent family history. Past Surgical History:   Procedure Laterality Date    CHOLECYSTECTOMY      TUBAL LIGATION          Social History     Socioeconomic History    Marital status: Single     Spouse name: None    Number of children: None    Years of education: None    Highest education level: None   Occupational History    None   Tobacco Use    Smoking status: Current Every Day Smoker     Packs/day: 1.00     Years: 23.00     Pack years: 23.00    Smokeless tobacco: Never Used   Vaping Use    Vaping Use: Never used   Substance and Sexual Activity    Alcohol use: Yes     Comment: 3 bud ice 24oz    Drug use: Yes     Types: Cocaine     Comment: last use yesterday    Sexual activity: Not Currently   Other Topics Concern    None   Social History Narrative    None     Social Determinants of Health     Financial Resource Strain:     Difficulty of Paying Living Expenses:    Food Insecurity:     Worried About Running Out of Food in the Last Year:     Ran Out of Food in the Last Year:    Transportation Needs:     Lack of Transportation (Medical):     Lack of Transportation (Non-Medical):    Physical Activity:     Days of Exercise per Week:     Minutes of Exercise per Session:    Stress:     Feeling of Stress :    Social Connections:     Frequency of Communication with Friends and Family:     Frequency of Social Gatherings with Friends and Family:     Attends [employer] Services:      Active Member of Clubs or Organizations:     Attends Club or Organization Meetings:     Marital Status:    Intimate Partner Violence:     Fear of Current or Ex-Partner:     Emotionally Abused:     Physically Psychiatric:         Mood and Affect: Mood normal.         Behavior: Behavior normal. Behavior is cooperative. Thought Content: Thought content normal.       Exam notable for delayed relaxation of deep tendon reflexes  Generalized weakness diminished as noted above    Medications:  Reviewed    Infusion Medications:    sodium chloride Stopped (08/20/21 1246)     Scheduled Medications:    cefUROXime  500 mg Oral 2 times per day    cabergoline  0.5 mg Oral Once per day on Mon Thu    levothyroxine  150 mcg Oral Daily    hydroCHLOROthiazide  25 mg Oral Daily    naproxen  500 mg Oral BID    sodium chloride flush  5-40 mL Intravenous 2 times per day    enoxaparin  40 mg Subcutaneous Daily    aspirin  81 mg Oral Daily    Or    aspirin  300 mg Rectal Daily    rosuvastatin  40 mg Oral Nightly     PRN Meds: sodium chloride flush, sodium chloride, ondansetron **OR** ondansetron, polyethylene glycol    Labs:   Recent Labs     08/18/21  1430 08/19/21  0610   WBC 10.9* 8.8   HGB 16.8* 14.7   HCT 48.7* 43.8    324     Recent Labs     08/18/21  1445      K 4.3   CL 99   CO2 30   BUN 10   CREATININE 1.11*   CALCIUM 9.9     Recent Labs     08/18/21  1445   AST 53*   ALT 29   BILITOT 0.3   ALKPHOS 56     Recent Labs     08/18/21  1430   INR 0.9     Recent Labs     08/18/21  1445 08/19/21  0610 08/20/21  0854   CKTOTAL 718* 461* 288*   TROPONINI <0.010  --   --        Urinalysis:   Lab Results   Component Value Date    NITRU Negative 08/18/2021    WBCUA  08/18/2021    BACTERIA Negative 08/18/2021    RBCUA 3-5 08/18/2021    BLOODU MODERATE 08/18/2021    SPECGRAV 1.026 08/18/2021    GLUCOSEU Negative 08/18/2021       Radiology:   Most recent    EEG No valid procedures specified. MRI of Brain No results found for this or any previous visit.   Results for orders placed during the hospital encounter of 08/18/21    MRI brain without contrast    Narrative  EXAMINATION: MRI BRAIN WO CONTRAST    CLINICAL HISTORY: stroke    COMPARISONS: NONE AVAILABLE    TECHNIQUE:  Brain CT from August 18, 2021    Multiplanar multisequence images of the brain were obtained without contrast. Diffusion perfusion imaging was obtained. FINDINGS:    There are no extra-axial collections. There is no evidence of hemorrhage. There are no areas of perfusion diffusion signal abnormality to suggest ischemia. The susceptibility images do not demonstrate evidence of hemosiderin deposition within the brain  parenchyma or the leptomeninges. There is preservation of the gray-white matter differentiation. There is an area of mildly increased T2/STIR signal in the subcortical white matter, corona radiata of the left frontal lobe. There is no associated edema or mass effect. The sulci and  ventricles are within normal limits without evidence of hydrocephalus. The midline structures are intact, the corpus callosum is within normal limits. The pineal gland is unremarkable. There is  homogeneous solid soft tissue enlargement of the pituitary gland which measures 11 x 12 x 16 mm. The pituitary extends cephalad past the diaphragm sella and is abutting and possibly mildly displacing the optic chiasm. There are no space-occupying lesions in the posterior fossa. The basilar cisterns are patent. The craniocervical junction is unremarkable. The visualized portions of the orbits are within normal limits, the globes are intact. The visualized portions of the paranasal sinuses are within normal limits. The calvarium and soft tissues are unremarkable. Impression  There is subtle enlargement of the pituitary gland which may reflect a pituitary macroadenoma which is extending cephalad and abutting and possibly displacing the optic chiasm. There is a very faint nonspecific area of T2/FLAIR hyperintensity in the subcortical white matter of the left frontal lobe involving the corona radiata.  The findings are nonspecific and may be secondary to prior ischemia, demyelination or  microangiopathy. There are no acute intracranial changes, there is no evidence of hemorrhage or acute ischemia. MRA of the Head and Neck: No results found for this or any previous visit. No results found for this or any previous visit. Results for orders placed during the hospital encounter of 08/18/21    MRA head without contrast    Narrative  EXAMINATION: MRA HEAD WO CONTRAST    DATE AND TIME:8/19/2021 10:44 AM    CLINICAL HISTORY: Stroke  stroke    COMPARISON:None    TECHNIQUE: Noncontrast time-of-flight imaging of the intracranial arterial vasculature was obtained and 3-D reconstructions were performed. Intracranial ICAs: Flow is visualized within the precavernous, cavernous, clinoid and supraclinoid segments of the internal carotid arteries bilaterally    Anterior Cerebral Arteries: The bilaterals  A1 and A2 segments are patent. Middle Cerebral Arteries: Bilateral horizontal, insular, opercular, and cortical segments of the right and left middle cerebral cerebral arteries are patent. Vertebral Arteries And Basilar Artery: There is adequate flow in the intracranial portions of the vertebral arteries and in the basilar artery. Posterior Cerebral Arteries: Bilateral posterior cerebral arteries are patent. Impression  The major intracranial arterial structures are patent without high-grade stenosis, large vessel cut off, or aneurysm. CT of the Head: Results for orders placed during the hospital encounter of 08/18/21    CT Head WO Contrast    Narrative  CT SCAN OF THE BRAIN WITHOUT CONTRAST    CLINICAL HISTORY:  right sided facial numbness      COMPARISONS:  8/7/2019    TECHNIQUE:  Unenhanced brain CT . FINDINGS:    The ventricles are normal in position. No focal abnormalities are seen within the brain parenchyma. There is no evidence of mass effect. There is no significant atrophy.  There is normal grey- white matter differentiation. There is no  evidence of hemorrhage. The pituitary gland is enlarged and enhances and measures approximately 1.1 x 1.1 x 1.5 cm in height. It appears similar to prior exam. This most likely secondary to a pituitary macroadenoma. It appears to abut the optic chiasm. The portions of the paranasal sinuses  visualized are clear. Mastoid air cells visualized are clear. Views of the skull are unremarkable. The orbits are unremarkable. Impression  NO EVIDENCE FOR ACUTE INTRACRANIAL PROCESS. 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 NO SIGNIFICANT CHANGE FROM PRIOR EXAM. CONSIDER CORRELATION WITH MRI. All CT scans at this facility use dose modulation, iterative reconstruction, and/or weight based dosing when appropriate to reduce radiation dose to as low as reasonably achievable. No results found for this or any previous visit. No results found for this or any previous visit. Carotid duplex: No results found for this or any previous visit. No results found for this or any previous visit. Results for orders placed during the hospital encounter of 08/18/21    US CAROTID ARTERY BILATERAL    Narrative  EXAMINATION: CAROTID ULTRASOUND    CLINICAL HISTORY: 59-year-old with hypertension who presents with headaches and vertigo. Positive smoking history    FINDINGS: Duplex and color Doppler ultrasound were performed of the bilateral extracranial carotid systems. Velocity criteria and internal carotid artery stenoses are extrapolated from data as defined by the Society of Radiologist in [facility] 13 Radiology 2003; 174;180-598. There are no previous carotid ultrasounds for comparison    RIGHT CAROTID SYSTEM: No significant plaque in the right carotid system. There are no elevations of peak systolic velocities or ICA/CCA velocity ratios.  Velocities in the ICA range from 54 cm/s proximal aspect, 44 cm/s mid aspect to 33 cm/s distal  aspect. ICA/CCA velocity ratio is 0.8. By ultrasound criteria there is less than 50 percent diameter reduction of the right ICA. Peak systolic velocities in the proximal ECA and mid CCA are 78 and 68cm/s. There is antegrade flow in the right vertebral  artery. LEFT CAROTID SYSTEM: No significant plaque in the left carotid system. . There are no elevations of peak systolic velocities or ICA/CCA velocity ratios. Velocities in the ICA range from 48 cm/s proximal aspect, 58 cm/s mid aspect to 53 cm/s distal aspect. ICA/CCA velocity ratio is 0.7. By ultrasound criteria there is less than 50 percent diameter reduction of the left ICA. Peak systolic velocities in the proximal ECA and mid CCA are 61 and 82cm/s. There is antegrade flow in the left vertebral artery. Impression  NO HEMODYNAMICALLY SIGNIFICANT INTERNAL CAROTID ARTERY STENOSES. ANTEGRADE VERTEBRAL FLOW. Echo No results found for this or any previous visit. 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 Being followed by endocrinology. TSH of 780 with T4 of 0.2. Symptoms were initially thought to be related to CVA and MRI of the brain, MRA of the head, and ultrasound of carotids reviewed without concerning findings. Discussed medication compliance as well as the need for follow-up regarding pituitary macroadenoma as this will need surveillance or it can lead to blindness. Patient stated understanding. Patient is okay for discharge once she is cleared by endocrinology. She will need to follow-up with us regarding the macroadenoma. Collaborating physicians: Dr Ashwin Raygoza    I have personally performed a face to face diagnostic evaluation on this patient, reviewed all data and investigations, and am the sole provider of all clinical decisions on the neurological status of this patient.   Examination shows general weakness. Patient has a significant elevated TSH and a prolactin level. Cortisol levels appear to be normal and case was discussed with Dr. Tricia Onofre. Patient has a pituitary adenoma quite close to the optic nerves. Patient requires a main perimetry examination for visual dysfunction once she is discharged. Surgical intervention will depend on these findings. Drew Mancuso MD, [address], American Board of Psychiatry & Neurology  Board Certified in Vascular Neurology  Board Certified in Neuromuscular Medicine  Certified in . [id number]       Electronically signed by Bernadette Garrido PA-C on 8/20/2021 at 1:41 PM

## 2021-08-21 VITALS
HEIGHT: 62 IN | HEART RATE: 75 BPM | RESPIRATION RATE: 18 BRPM | BODY MASS INDEX: 27.23 KG/M2 | WEIGHT: 148 LBS | SYSTOLIC BLOOD PRESSURE: 105 MMHG | OXYGEN SATURATION: 95 % | TEMPERATURE: 97.9 F | DIASTOLIC BLOOD PRESSURE: 65 MMHG

## 2021-08-21 LAB
ADRENOCORTICOTROPIC HORMONE: 7.1 PG/ML (ref 7.2–63.3)
CK MB: 2.5 NG/ML (ref 0–3.8)
CREATINE KINASE-MB INDEX: 1 % (ref 0–3.5)
TOTAL CK: 252 U/L (ref 0–170)

## 2021-08-21 PROCEDURE — 6370000000 HC RX 637 (ALT 250 FOR IP): Performed by: INTERNAL MEDICINE

## 2021-08-21 PROCEDURE — 36415 COLL VENOUS BLD VENIPUNCTURE: CPT

## 2021-08-21 PROCEDURE — 6360000002 HC RX W HCPCS: Performed by: INTERNAL MEDICINE

## 2021-08-21 PROCEDURE — 99233 SBSQ HOSP IP/OBS HIGH 50: CPT | Performed by: PSYCHIATRY & NEUROLOGY

## 2021-08-21 PROCEDURE — APPSS45 APP SPLIT SHARED TIME 31-45 MINUTES: Performed by: STUDENT IN AN ORGANIZED HEALTH CARE EDUCATION/TRAINING PROGRAM

## 2021-08-21 PROCEDURE — 82550 ASSAY OF CK (CPK): CPT

## 2021-08-21 PROCEDURE — 82553 CREATINE MB FRACTION: CPT

## 2021-08-21 PROCEDURE — 2580000003 HC RX 258: Performed by: INTERNAL MEDICINE

## 2021-08-21 RX ORDER — CABERGOLINE 0.5 MG/1
0.5 TABLET ORAL
Qty: 8 TABLET | Refills: 3 | Status: SHIPPED | OUTPATIENT
Start: 2021-08-23

## 2021-08-21 RX ORDER — CEFUROXIME AXETIL 500 MG/1
500 TABLET ORAL EVERY 12 HOURS SCHEDULED
Qty: 8 TABLET | Refills: 0 | Status: SHIPPED | OUTPATIENT
Start: 2021-08-21 | End: 2021-08-25

## 2021-08-21 RX ORDER — ROSUVASTATIN CALCIUM 40 MG/1
40 TABLET, COATED ORAL NIGHTLY
Qty: 30 TABLET | Refills: 3 | Status: SHIPPED | OUTPATIENT
Start: 2021-08-21

## 2021-08-21 RX ORDER — LEVOTHYROXINE SODIUM 0.12 MG/1
250 TABLET ORAL DAILY
Qty: 60 TABLET | Refills: 0 | Status: SHIPPED | OUTPATIENT
Start: 2021-08-22 | End: 2021-11-09 | Stop reason: SDUPTHER

## 2021-08-21 RX ADMIN — ASPIRIN 81 MG: 81 TABLET, COATED ORAL at 08:35

## 2021-08-21 RX ADMIN — CEFUROXIME AXETIL 500 MG: 500 TABLET ORAL at 08:35

## 2021-08-21 RX ADMIN — ENOXAPARIN SODIUM 40 MG: 40 INJECTION SUBCUTANEOUS at 08:35

## 2021-08-21 RX ADMIN — HYDROCHLOROTHIAZIDE 25 MG: 25 TABLET ORAL at 08:35

## 2021-08-21 RX ADMIN — LEVOTHYROXINE SODIUM 250 MCG: 0.12 TABLET ORAL at 07:27

## 2021-08-21 RX ADMIN — ONDANSETRON 4 MG: 2 INJECTION INTRAMUSCULAR; INTRAVENOUS at 03:24

## 2021-08-21 RX ADMIN — NAPROXEN 500 MG: 500 TABLET ORAL at 08:35

## 2021-08-21 RX ADMIN — Medication 10 ML: at 08:41

## 2021-08-21 ASSESSMENT — ENCOUNTER SYMPTOMS
PHOTOPHOBIA: 0
CHEST TIGHTNESS: 0
TROUBLE SWALLOWING: 0
SHORTNESS OF BREATH: 0
DIARRHEA: 0
ALLERGIC/IMMUNOLOGIC NEGATIVE: 1
NAUSEA: 0
VOMITING: 0

## 2021-08-21 ASSESSMENT — PAIN SCALES - GENERAL
PAINLEVEL_OUTOF10: 4
PAINLEVEL_OUTOF10: 7

## 2021-08-21 NOTE — PROGRESS NOTES
Pt assessment complete. Pt is alert and oriented. Medicated with scheduled  Naproxen. Pt has complaints of HA. Also reporting cramping feeling to abd. Pt reports numbness and tingling to right side and also noted to be weaker to right side. Strength still moderate though. Pt given HS snack. Denies further needs. Call light in reach. 0330:  Pt medicated with zofran for complaints of nausea with some dry heaving. Pt denies further needs will monitor. 4349:  Pt resting with eyes closed. Respirations even and unlabored. Call light in reach.

## 2021-08-21 NOTE — DISCHARGE SUMMARY
Hospital Medicine Discharge Summary    Karley Leyva  :  1975  MRN:  [id number]    Admit date:  2021  Discharge date:  2021    Admitting Physician:  Annmarie Martel DO  Primary Care Physician:  Caryle Ivans, MD      Discharge Diagnoses:      Strokelike symptom-right-sided numbness and tingling, gait ataxia  Cocaine abuse  Pituitary macroadenoma-pressing on the optic chiasm, she did not have any headache or visual changes yesterday but reports some blurry vision today. No tunnel vision. Severe hypothyroidism-  Hyperprolactinemia  Nontraumatic rhabdomyolysis-likely due to cocaine abuse  Hypertension  Hypothyroidism    Chief Complaint   Patient presents with    Numbness     right side     Hospital Course:     Patient is a 49-year-old female with a past medical history of hypothyroidism, HTN, cocaine abuse who presented to hospital with strokelike symptoms. She had right-sided numbness and tingling and gait ataxia. She was admitted to telemetry neuro floor and neurology was consulted. Stroke work-up was initiated. Patient MRI of the brain and MRA of the head showed negative findings for stroke. Her stroke work-up was unremarkable. She had pituitary macroadenoma on her MRI of the brain. She had no significant symptoms during the stay including severe headaches or tunnel vision. However, she had severely elevated TSH at 7 ED and reduced T4 significantly. She was seen by endocrinologist.  Prolactin was also elevated. She was started on cabergoline and in addition to increasing her Synthroid dose. She was instructed to follow-up with endocrinologist and eye clinic as outpatient. I extensively educated her regarding the importance of compliance with her medications especially her Synthroid. I suspect that her symptoms that brought her and are related to hypothyroidism rather than stroke as per work-up.     Exam on discharge:   /65   Pulse 75   Temp 97.9 °F (36.6 °C)   Resp 18   Ht 5' 2\" (1.575 m)   Wt 148 lb (67.1 kg)   LMP 12/08/2019 (Exact Date)   SpO2 95%   BMI 27.07 kg/m²   General appearance: No apparent distress, appears stated age and cooperative. HEENT: Pupils equal, round, and reactive to light. Conjunctivae/corneas clear. Neck: Supple, with full range of motion. No jugular venous distention. Trachea midline. Respiratory:  Normal respiratory effort. Clear to auscultation, bilaterally without Rales/Wheezes/Rhonchi. Cardiovascular: Regular rate and rhythm with normal S1/S2 without murmurs, rubs or gallops. Abdomen: Soft, non-tender, non-distended with normal bowel sounds. Musculoskeletal: No clubbing, cyanosis or edema bilaterally. Full range of motion without deformity. Skin: Skin color, texture, turgor normal.  No rashes or lesions. Neuro: Non Focal. Symetrical motor and tone. Nl Comprehension, Alert,awake and oriented. NL CN. Symetrical tone and reflexes. Psychiatric: Alert and oriented, thought content appropriate, normal insight  Capillary Refill: Brisk,< 3 seconds   Peripheral Pulses: +2 palpable, equal bilaterally     Patient was seen by the following consultants while admitted to [facility]:   Consults:  Mart Claudio 53    Significant Diagnostic Studies:    Refer to chart     Please refer to chart if no studies are shown here    CT Head WO Contrast    Result Date: 8/18/2021  CT SCAN OF THE BRAIN WITHOUT CONTRAST CLINICAL HISTORY:  right sided facial numbness COMPARISONS:  8/7/2019 TECHNIQUE:  Unenhanced brain CT . FINDINGS:    The ventricles are normal in position. No focal abnormalities are seen within the brain parenchyma. There is no evidence of mass effect. There is no significant atrophy. There is normal grey- white matter differentiation. There is no evidence of hemorrhage. The pituitary gland is enlarged and enhances and measures approximately 1.1 x 1.1 x 1.5 cm in height.  It appears similar to prior exam. This most likely secondary to a pituitary macroadenoma. It appears to abut the optic chiasm. The portions of the paranasal sinuses  visualized are clear. Mastoid air cells visualized are clear. Views of the skull are unremarkable. The orbits are unremarkable. NO EVIDENCE FOR ACUTE INTRACRANIAL PROCESS. 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 NO SIGNIFICANT CHANGE FROM PRIOR EXAM. CONSIDER CORRELATION WITH MRI. All CT scans at this facility use dose modulation, iterative reconstruction, and/or weight based dosing when appropriate to reduce radiation dose to as low as reasonably achievable. MRA head without contrast    Result Date: 8/19/2021  EXAMINATION: MRA HEAD WO CONTRAST DATE AND TIME:8/19/2021 10:44 AM CLINICAL HISTORY: Stroke  stroke  COMPARISON:None TECHNIQUE: Noncontrast time-of-flight imaging of the intracranial arterial vasculature was obtained and 3-D reconstructions were performed. Intracranial ICAs: Flow is visualized within the precavernous, cavernous, clinoid and supraclinoid segments of the internal carotid arteries bilaterally    Anterior Cerebral Arteries: The bilaterals  A1 and A2 segments are patent. Middle Cerebral Arteries: Bilateral horizontal, insular, opercular, and cortical segments of the right and left middle cerebral cerebral arteries are patent. Vertebral Arteries And Basilar Artery: There is adequate flow in the intracranial portions of the vertebral arteries and in the basilar artery. Posterior Cerebral Arteries: Bilateral posterior cerebral arteries are patent. The major intracranial arterial structures are patent without high-grade stenosis, large vessel cut off, or aneurysm.      MRI brain without contrast    Result Date: 8/19/2021  EXAMINATION: MRI BRAIN WO CONTRAST CLINICAL HISTORY:  stroke COMPARISONS: NONE AVAILABLE TECHNIQUE:  Brain CT from August 18, 2021 Multiplanar multisequence images of the brain were obtained without contrast. Diffusion perfusion imaging was obtained. FINDINGS:  There are no extra-axial collections. There is no evidence of hemorrhage. There are no areas of perfusion diffusion signal abnormality to suggest ischemia. The susceptibility images do not demonstrate evidence of hemosiderin deposition within the brain parenchyma or the leptomeninges. There is preservation of the gray-white matter differentiation. There is an area of mildly increased T2/STIR signal in the subcortical white matter, corona radiata of the left frontal lobe. There is no associated edema or mass effect. The sulci and ventricles are within normal limits without evidence of hydrocephalus. The midline structures are intact, the corpus callosum is within normal limits. The pineal gland is unremarkable. There is  homogeneous solid soft tissue enlargement of the pituitary gland which measures 11 x 12 x 16 mm. The pituitary extends cephalad past the diaphragm sella and is abutting and possibly mildly displacing the optic chiasm. There are no space-occupying lesions in the posterior fossa. The basilar cisterns are patent. The craniocervical junction is unremarkable. The visualized portions of the orbits are within normal limits, the globes are intact. The visualized portions of the paranasal sinuses are within normal limits. The calvarium and soft tissues are unremarkable. There is subtle enlargement of the pituitary gland which may reflect a pituitary macroadenoma which is extending cephalad and abutting and possibly displacing the optic chiasm. There is a very faint nonspecific area of T2/FLAIR hyperintensity in the subcortical white matter of the left frontal lobe involving the corona radiata. The findings are nonspecific and may be secondary to prior ischemia, demyelination or microangiopathy. There are no acute intracranial changes, there is no evidence of hemorrhage or acute ischemia.     US CAROTID ARTERY BILATERAL    Result Date: 8/19/2021  EXAMINATION: CAROTID ULTRASOUND CLINICAL HISTORY: 51-year-old with hypertension who presents with headaches and vertigo. Positive smoking history FINDINGS: Duplex and color Doppler ultrasound were performed of the bilateral extracranial carotid systems. Velocity criteria and internal carotid artery stenoses are extrapolated from data as defined by the Society of Radiologist in [facility] 13 Radiology 2003; [id number];205-083. There are no previous carotid ultrasounds for comparison RIGHT CAROTID SYSTEM: No significant plaque in the right carotid system. There are no elevations of peak systolic velocities or ICA/CCA velocity ratios. Velocities in the ICA range from 54 cm/s proximal aspect, 44 cm/s mid aspect to 33 cm/s distal aspect. ICA/CCA velocity ratio is 0.8. By ultrasound criteria there is less than 50 percent diameter reduction of the right ICA. Peak systolic velocities in the proximal ECA and mid CCA are 78 and 68cm/s. There is antegrade flow in the right vertebral artery. LEFT CAROTID SYSTEM: No significant plaque in the left carotid system. . There are no elevations of peak systolic velocities or ICA/CCA velocity ratios. Velocities in the ICA range from 48 cm/s proximal aspect, 58 cm/s mid aspect to 53 cm/s distal aspect. ICA/CCA velocity ratio is 0.7. By ultrasound criteria there is less than 50 percent diameter reduction of the left ICA. Peak systolic velocities in the proximal ECA and mid CCA are 61 and 82cm/s. There is antegrade flow in the left vertebral artery. NO HEMODYNAMICALLY SIGNIFICANT INTERNAL CAROTID ARTERY STENOSES. ANTEGRADE VERTEBRAL FLOW.       Discharge Medications:       Maureen Dontae   Home Medication Instructions CZX:[id number]    Printed on:08/21/21 1423   Medication Information                      aspirin EC 81 MG EC tablet  Take 81 mg by mouth daily             cabergoline (DOSTINEX) 0.5 MG tablet  Take 1 tablet by mouth Twice

## 2021-08-21 NOTE — PROGRESS NOTES
Progress Note  Date:2021       [facility]  Patient Katharine Beard     YOB: 1975     Age:46 y.o. Chief  Complains  Hypothyroidism pituitary macroadenoma    Subjective    Subjective:  Symptoms:  Stable. She reports malaise and weakness. Diet:  Adequate intake. Activity level: Returning to normal.       Review of Systems   Neurological: Positive for weakness. Objective         Vitals Last 24 Hours:  TEMPERATURE:  Temp  Av °F (36.7 °C)  Min: 97.9 °F (36.6 °C)  Max: 98.1 °F (36.7 °C)  RESPIRATIONS RANGE: Resp  Av  Min: 18  Max: 18  PULSE OXIMETRY RANGE: SpO2  Av.5 %  Min: 98 %  Max: 99 %  PULSE RANGE: Pulse  Av  Min: 65  Max: 73  BLOOD PRESSURE RANGE: Systolic (65GED), OQ , Min:101 , LYO:282   ; Diastolic (88FZI), LFM:13, Min:67, Max:81    I/O (24Hr): Intake/Output Summary (Last 24 hours) at 2021 2210  Last data filed at 2021 1315  Gross per 24 hour   Intake 3879 ml   Output --   Net 3879 ml     Objective:  General Appearance:  Comfortable. Vital signs: (most recent): Blood pressure 117/81, pulse 65, temperature 97.9 °F (36.6 °C), temperature source Oral, resp. rate 18, height 5' 2\" (1.575 m), weight 148 lb (67.1 kg), last menstrual period 2019, SpO2 99 %. Vital signs are normal.    HEENT: Normal HEENT exam.    Lungs:  Normal effort and normal respiratory rate. Heart: Normal rate. Regular rhythm. S1 normal and S2 normal.    Abdomen: Abdomen is soft. Extremities: Normal range of motion. Neurological: Patient is alert and oriented to person, place and time. Skin:  No rash.      Labs/Imaging/Diagnostics    Labs:  CBC:  Recent Labs     21  1430 21  0610   WBC 10.9* 8.8   RBC 5.29 4.66   HGB 16.8* 14.7   HCT 48.7* 43.8   MCV 92.0 94.1   RDW 14.3 14.3    324     CHEMISTRIES:  Recent Labs     21  1445      K 4.3   CL 99   CO2 30   BUN 10   CREATININE 1.11*   GLUCOSE 74   MG 2.2 PT/INR:  Recent Labs     08/18/21  1430   PROTIME 12.2*   INR 0.9     APTT:  Recent Labs     08/18/21  1430   APTT 32.4     LIVER PROFILE:  Recent Labs     08/18/21  1445   AST 53*   ALT 29   BILITOT 0.3   ALKPHOS 56       Imaging Last 24 Hours:  MRA head without contrast    Result Date: 8/19/2021  EXAMINATION: MRA HEAD WO CONTRAST DATE AND TIME:8/19/2021 10:44 AM CLINICAL HISTORY: Stroke  stroke  COMPARISON:None TECHNIQUE: Noncontrast time-of-flight imaging of the intracranial arterial vasculature was obtained and 3-D reconstructions were performed. Intracranial ICAs: Flow is visualized within the precavernous, cavernous, clinoid and supraclinoid segments of the internal carotid arteries bilaterally    Anterior Cerebral Arteries: The bilaterals  A1 and A2 segments are patent. Middle Cerebral Arteries: Bilateral horizontal, insular, opercular, and cortical segments of the right and left middle cerebral cerebral arteries are patent. Vertebral Arteries And Basilar Artery: There is adequate flow in the intracranial portions of the vertebral arteries and in the basilar artery. Posterior Cerebral Arteries: Bilateral posterior cerebral arteries are patent. The major intracranial arterial structures are patent without high-grade stenosis, large vessel cut off, or aneurysm. MRI brain without contrast    Result Date: 8/19/2021  EXAMINATION: MRI BRAIN WO CONTRAST CLINICAL HISTORY:  stroke COMPARISONS: NONE AVAILABLE TECHNIQUE:  Brain CT from August 18, 2021 Multiplanar multisequence images of the brain were obtained without contrast. Diffusion perfusion imaging was obtained. FINDINGS:  There are no extra-axial collections. There is no evidence of hemorrhage. There are no areas of perfusion diffusion signal abnormality to suggest ischemia. The susceptibility images do not demonstrate evidence of hemosiderin deposition within the brain parenchyma or the leptomeninges.  There is preservation of the gray-white matter Consensus  Conference Radiology 2003; [id number]. There are no previous carotid ultrasounds for comparison RIGHT CAROTID SYSTEM: No significant plaque in the right carotid system. There are no elevations of peak systolic velocities or ICA/CCA velocity ratios. Velocities in the ICA range from 54 cm/s proximal aspect, 44 cm/s mid aspect to 33 cm/s distal aspect. ICA/CCA velocity ratio is 0.8. By ultrasound criteria there is less than 50 percent diameter reduction of the right ICA. Peak systolic velocities in the proximal ECA and mid CCA are 78 and 68cm/s. There is antegrade flow in the right vertebral artery. LEFT CAROTID SYSTEM: No significant plaque in the left carotid system. . There are no elevations of peak systolic velocities or ICA/CCA velocity ratios. Velocities in the ICA range from 48 cm/s proximal aspect, 58 cm/s mid aspect to 53 cm/s distal aspect. ICA/CCA velocity ratio is 0.7. By ultrasound criteria there is less than 50 percent diameter reduction of the left ICA. Peak systolic velocities in the proximal ECA and mid CCA are 61 and 82cm/s. There is antegrade flow in the left vertebral artery. NO HEMODYNAMICALLY SIGNIFICANT INTERNAL CAROTID ARTERY STENOSES. ANTEGRADE VERTEBRAL FLOW. Results for Telma Hirsch (MRN [id number]) as of 8/20/2021 22:11   Ref.  Range 8/19/2021 19:09 8/20/2021 08:54 8/20/2021 09:36 8/20/2021 10:14   Total CK Latest Ref Range: 0 - 170 U/L  288 (H)     CK-MB Latest Ref Range: 0.0 - 3.8 ng/mL  2.4     CK-MB Index Latest Ref Range: 0.0 - 3.5 %  0.8     Cortisol Latest Units: ug/dL  17.3 25.8 30.6   Cortisol - AM Latest Ref Range: 6.2 - 19.4 ug/dL 4.8 (L)      FSH Latest Units: mIU/mL 70.9      LH Latest Units: mIU/mL 4.3      TSH Latest Ref Range: 0.440 - 3.860 uIU/mL 780.600 (H)      T4 Free Latest Ref Range: 0.84 - 1.68 ng/dL 0.20 (L)          Assessment//Plan           Hospital Problems         Last Modified POA    Stroke-like episode 8/18/2021 Yes    Pituitary adenoma ([address] 8/19/2021 Yes    Hypothyroidism 8/19/2021 Yes    Hyperprolactinemia ([address] 8/19/2021 Yes    Paresthesia 8/20/2021 Yes    Ataxic gait 8/20/2021 Yes        Assessment:  (Diego macroadenoma with hyperprolactinemia  Hypothyroidism with poor compliance  CVA/TIA  Substance abuse  Reviewed ACTH stimulation test ruling out any adrenal insufficiency). Plan:   (Continue Dostinex 0.5 mg twice week Synthroid increased to 250 mcg daily  Compliance stressed with both Dostinex and Synthroid  Reviewed neurology consult  Follow-up with endocrine  Okay to discharge patient from endocrine  Total time spent was 25 minutes).        Electronically signed by Pooja Ruiz MD on 8/20/21 at 10:10 PM EDT

## 2021-08-21 NOTE — FLOWSHEET NOTE
2669-- A&Ox4. PERRLA. Follows commands, equal strength. Denies any numbness or tingling at this time. No fever or chills. Pt had some nausea throughout the night but symptoms have resolved. No complaints of a headache. No SOB/dyspnea, lungs are clear bilaterally. Denies any chest pain, s1 and s2 noted. Abdomen is soft and non-tender to touch, active bowels x3. No edema, no pain with urination. Call light within reach.

## 2021-08-21 NOTE — PROGRESS NOTES
[facility] Neurology Progress Note  Name: Alicia Park  Age: 55 y.o. Gender: female  CodeStatus: Full Code  Allergies: No Known Allergies    Chief Complaint:Numbness (right side)    Primary Care Provider: Irene Moran MD  InpatientTreatment Team: Treatment Team: Attending Provider: Huma Tarango DO; Consulting Physician: Maribel Rasheed MD; Consulting Physician: Marilu Durán MD; : Rachel Azevedo RN; Registered Nurse: Fariha Farley RN; Utilization Reviewer: Chrissy Ortiz RN  Admission Date: 8/18/2021      HPI     Patient seen and evaluated for neurology follow-up for gait ataxia and weakness in the setting of severe hypothyroidism due to medical noncompliance. Patient also has a known pituitary macroadenoma which has not been followed by any neurologist.    Patient is alert and oriented x3, cooperative, and in no acute distress. Weakness is greatly improved and she is ambulating without difficulty. Endocrinology has seen her and has okayed her for discharge. Emphasized the importance of following up with neurology for surveillance of her pituitary macroadenoma. Patient stated understanding but states that she is addicted to alcohol which interferes with taking care of her health. Exam nonfocal.  No new changes,        No Known Allergies    Patient Active Problem List   Diagnosis    Stroke-like episode    Pituitary adenoma ([address]    Hypothyroidism    Hyperprolactinemia (HCC)    Paresthesia    Ataxic gait       Past Medical History:   Diagnosis Date    Hypertension     Thyroid disease        History reviewed. No pertinent family history.     Past Surgical History:   Procedure Laterality Date    CHOLECYSTECTOMY      TUBAL LIGATION          Social History     Socioeconomic History    Marital status: Single     Spouse name: None    Number of children: None    Years of education: None    Highest education level: None   Occupational History    None   Tobacco Use    Smoking status: Current Every Day Smoker     Packs/day: 1.00     Years: 23.00     Pack years: 23.00    Smokeless tobacco: Never Used   Vaping Use    Vaping Use: Never used   Substance and Sexual Activity    Alcohol use: Yes     Comment: 3 bud ice 24oz    Drug use: Yes     Types: Cocaine     Comment: last use yesterday    Sexual activity: Not Currently   Other Topics Concern    None   Social History Narrative    None     Social Determinants of Health     Financial Resource Strain:     Difficulty of Paying Living Expenses:    Food Insecurity:     Worried About Running Out of Food in the Last Year:     Ran Out of Food in the Last Year:    Transportation Needs:     Lack of Transportation (Medical):     Lack of Transportation (Non-Medical):    Physical Activity:     Days of Exercise per Week:     Minutes of Exercise per Session:    Stress:     Feeling of Stress :    Social Connections:     Frequency of Communication with Friends and Family:     Frequency of Social Gatherings with Friends and Family:     Attends Spiritism Services: Active Member of Clubs or Organizations:     Attends Club or Organization Meetings:     Marital Status:    Intimate Partner Violence:     Fear of Current or Ex-Partner:     Emotionally Abused:     Physically Abused:     Sexually Abused:         Vitals:    08/21/21 0834   BP: 105/65   Pulse: 75   Resp:    Temp: 97.9 °F (36.6 °C)   SpO2: 95%       Review of Systems   Constitutional: Negative for chills and fever. HENT: Negative for congestion and trouble swallowing. Eyes: Negative for photophobia and visual disturbance. Respiratory: Negative for chest tightness and shortness of breath. Cardiovascular: Negative for chest pain. Gastrointestinal: Negative for diarrhea, nausea and vomiting. Endocrine: Negative. Genitourinary: Negative. Musculoskeletal: Negative for gait problem. Allergic/Immunologic: Negative.     Neurological: Negative for dizziness, tremors, seizures, syncope, facial asymmetry, speech difficulty, light-headedness, numbness and headaches. Hematological: Negative. Psychiatric/Behavioral: Negative for hallucinations and self-injury. Physical Exam  Constitutional:       General: She is awake. Appearance: Normal appearance. HENT:      Head: Normocephalic and atraumatic. Eyes:      General: Lids are normal.      Extraocular Movements: Extraocular movements intact. Conjunctiva/sclera: Conjunctivae normal.      Pupils: Pupils are equal, round, and reactive to light. Cardiovascular:      Rate and Rhythm: Normal rate and regular rhythm. Pulses: Normal pulses. Heart sounds: Normal heart sounds. Pulmonary:      Effort: Pulmonary effort is normal.      Breath sounds: Normal breath sounds. Musculoskeletal:         General: Normal range of motion. Skin:     General: Skin is warm and dry. Neurological:      General: No focal deficit present. Mental Status: She is alert and oriented to person, place, and time. Cranial Nerves: No cranial nerve deficit. Sensory: No sensory deficit. Motor: Weakness present. Coordination: Coordination normal.      Gait: Gait normal.      Deep Tendon Reflexes: Reflexes abnormal.   Psychiatric:         Mood and Affect: Mood normal.         Behavior: Behavior normal. Behavior is cooperative. Thought Content:  Thought content normal.       Exam notable for delayed relaxation of deep tendon reflexes  Generalized weakness diminished as noted above    Medications:  Reviewed    Infusion Medications:    sodium chloride Stopped (08/20/21 1246)     Scheduled Medications:    nicotine  1 patch Transdermal Daily    levothyroxine  250 mcg Oral Daily    cefUROXime  500 mg Oral 2 times per day    cabergoline  0.5 mg Oral Once per day on Mon Thu    hydroCHLOROthiazide  25 mg Oral Daily    naproxen  500 mg Oral BID    sodium chloride flush  5-40 mL Intravenous 2 times per day    enoxaparin  40 mg Subcutaneous Daily    aspirin 81 mg Oral Daily    Or    aspirin  300 mg Rectal Daily    rosuvastatin  40 mg Oral Nightly     PRN Meds: sodium chloride flush, sodium chloride, ondansetron **OR** ondansetron, polyethylene glycol    Labs:   Recent Labs     08/18/21  1430 08/19/21  0610   WBC 10.9* 8.8   HGB 16.8* 14.7   HCT 48.7* 43.8    324     Recent Labs     08/18/21  1445      K 4.3   CL 99   CO2 30   BUN 10   CREATININE 1.11*   CALCIUM 9.9     Recent Labs     08/18/21  1445   AST 53*   ALT 29   BILITOT 0.3   ALKPHOS 56     Recent Labs     08/18/21  1430   INR 0.9     Recent Labs     08/18/21  1445 08/18/21  1445 08/19/21  0610 08/20/21  0854 08/21/21  0551   CKTOTAL 718*   < > 461* 288* 252*   TROPONINI <0.010  --   --   --   --     < > = values in this interval not displayed. Urinalysis:   Lab Results   Component Value Date    NITRU Negative 08/18/2021    WBCUA  08/18/2021    BACTERIA Negative 08/18/2021    RBCUA 3-5 08/18/2021    BLOODU MODERATE 08/18/2021    SPECGRAV 1.026 08/18/2021    GLUCOSEU Negative 08/18/2021       Radiology:   Most recent    EEG No valid procedures specified. MRI of Brain No results found for this or any previous visit. Results for orders placed during the hospital encounter of 08/18/21    MRI brain without contrast    Narrative  EXAMINATION: MRI BRAIN WO CONTRAST    CLINICAL HISTORY:  stroke    COMPARISONS: NONE AVAILABLE    TECHNIQUE:  Brain CT from August 18, 2021    Multiplanar multisequence images of the brain were obtained without contrast. Diffusion perfusion imaging was obtained. FINDINGS:    There are no extra-axial collections. There is no evidence of hemorrhage. There are no areas of perfusion diffusion signal abnormality to suggest ischemia. The susceptibility images do not demonstrate evidence of hemosiderin deposition within the brain  parenchyma or the leptomeninges. There is preservation of the gray-white matter differentiation.  There is an area of mildly increased T2/STIR signal in the subcortical white matter, corona radiata of the left frontal lobe. There is no associated edema or mass effect. The sulci and  ventricles are within normal limits without evidence of hydrocephalus. The midline structures are intact, the corpus callosum is within normal limits. The pineal gland is unremarkable. There is  homogeneous solid soft tissue enlargement of the pituitary gland which measures 11 x 12 x 16 mm. The pituitary extends cephalad past the diaphragm sella and is abutting and possibly mildly displacing the optic chiasm. There are no space-occupying lesions in the posterior fossa. The basilar cisterns are patent. The craniocervical junction is unremarkable. The visualized portions of the orbits are within normal limits, the globes are intact. The visualized portions of the paranasal sinuses are within normal limits. The calvarium and soft tissues are unremarkable. Impression  There is subtle enlargement of the pituitary gland which may reflect a pituitary macroadenoma which is extending cephalad and abutting and possibly displacing the optic chiasm. There is a very faint nonspecific area of T2/FLAIR hyperintensity in the subcortical white matter of the left frontal lobe involving the corona radiata. The findings are nonspecific and may be secondary to prior ischemia, demyelination or  microangiopathy. There are no acute intracranial changes, there is no evidence of hemorrhage or acute ischemia. MRA of the Head and Neck: No results found for this or any previous visit. No results found for this or any previous visit.   Results for orders placed during the hospital encounter of 08/18/21    MRA head without contrast    Narrative  EXAMINATION: MRA HEAD WO CONTRAST    DATE AND TIME:8/19/2021 10:44 AM    CLINICAL HISTORY: Stroke  stroke    COMPARISON:None    TECHNIQUE: Noncontrast time-of-flight imaging of the intracranial arterial vasculature was obtained and 3-D reconstructions were performed. Intracranial ICAs: Flow is visualized within the precavernous, cavernous, clinoid and supraclinoid segments of the internal carotid arteries bilaterally    Anterior Cerebral Arteries: The bilaterals  A1 and A2 segments are patent. Middle Cerebral Arteries: Bilateral horizontal, insular, opercular, and cortical segments of the right and left middle cerebral cerebral arteries are patent. Vertebral Arteries And Basilar Artery: There is adequate flow in the intracranial portions of the vertebral arteries and in the basilar artery. Posterior Cerebral Arteries: Bilateral posterior cerebral arteries are patent. Impression  The major intracranial arterial structures are patent without high-grade stenosis, large vessel cut off, or aneurysm. CT of the Head: Results for orders placed during the hospital encounter of 08/18/21    CT Head WO Contrast    Narrative  CT SCAN OF THE BRAIN WITHOUT CONTRAST    CLINICAL HISTORY:  right sided facial numbness      COMPARISONS:  8/7/2019    TECHNIQUE:  Unenhanced brain CT . FINDINGS:    The ventricles are normal in position. No focal abnormalities are seen within the brain parenchyma. There is no evidence of mass effect. There is no significant atrophy. There is normal grey- white matter differentiation. There is no  evidence of hemorrhage. The pituitary gland is enlarged and enhances and measures approximately 1.1 x 1.1 x 1.5 cm in height. It appears similar to prior exam. This most likely secondary to a pituitary macroadenoma. It appears to abut the optic chiasm. The portions of the paranasal sinuses  visualized are clear. Mastoid air cells visualized are clear. Views of the skull are unremarkable. The orbits are unremarkable. Impression  NO EVIDENCE FOR ACUTE INTRACRANIAL PROCESS.     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 NO SIGNIFICANT CHANGE FROM PRIOR EXAM. CONSIDER CORRELATION WITH MRI. All CT scans at this facility use dose modulation, iterative reconstruction, and/or weight based dosing when appropriate to reduce radiation dose to as low as reasonably achievable. No results found for this or any previous visit. No results found for this or any previous visit. Carotid duplex: No results found for this or any previous visit. No results found for this or any previous visit. Results for orders placed during the hospital encounter of 08/18/21    US CAROTID ARTERY BILATERAL    Narrative  EXAMINATION: CAROTID ULTRASOUND    CLINICAL HISTORY: 68-year-old with hypertension who presents with headaches and vertigo. Positive smoking history    FINDINGS: Duplex and color Doppler ultrasound were performed of the bilateral extracranial carotid systems. Velocity criteria and internal carotid artery stenoses are extrapolated from data as defined by the Society of Radiologist in [facility] 13 Radiology 2003; 079;048-038. There are no previous carotid ultrasounds for comparison    RIGHT CAROTID SYSTEM: No significant plaque in the right carotid system. There are no elevations of peak systolic velocities or ICA/CCA velocity ratios. Velocities in the ICA range from 54 cm/s proximal aspect, 44 cm/s mid aspect to 33 cm/s distal  aspect. ICA/CCA velocity ratio is 0.8. By ultrasound criteria there is less than 50 percent diameter reduction of the right ICA. Peak systolic velocities in the proximal ECA and mid CCA are 78 and 68cm/s. There is antegrade flow in the right vertebral  artery. LEFT CAROTID SYSTEM: No significant plaque in the left carotid system. . There are no elevations of peak systolic velocities or ICA/CCA velocity ratios. Velocities in the ICA range from 48 cm/s proximal aspect, 58 cm/s mid aspect to 53 cm/s distal aspect. ICA/CCA velocity ratio is 0.7.  By ultrasound criteria there is less than 50 percent diameter reduction of the left ICA. Peak systolic velocities in the proximal ECA and mid CCA are 61 and 82cm/s. There is antegrade flow in the left vertebral artery. Impression  NO HEMODYNAMICALLY SIGNIFICANT INTERNAL CAROTID ARTERY STENOSES. ANTEGRADE VERTEBRAL FLOW. Echo No results found for this or any previous visit. 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 Being followed by endocrinology. TSH of 780 with T4 of 0.2. Symptoms were initially thought to be related to CVA and MRI of the brain, MRA of the head, and ultrasound of carotids reviewed without concerning findings. Discussed medication compliance as well as the need for follow-up regarding pituitary macroadenoma as this will need surveillance or it can lead to blindness. Patient stated understanding. Patient is okay for discharge once she is cleared by endocrinology. She will need to follow-up with us regarding the macroadenoma. Examination shows general weakness. Patient has a significant elevated TSH and a prolactin level. Cortisol levels appear to be normal and case was discussed with Dr. Facundo Rausch. Patient has a pituitary adenoma quite close to the optic nerves. Patient requires a main perimetry examination for visual dysfunction once she is discharged. Surgical intervention will depend on these findings. Generalized weakness has improved with treatment for hypothyroidism. Patient now able to ambulate unassisted and exam nonfocal.  Endocrinology has okayed patient for discharge. Emphasized the importance of following up with neurology regarding pituitary adenoma to patient. Patient stated that she has alcohol use disorder and that this sometimes interferes with taking care of her health. Offered support for alcohol use disorder. Patient declined.   Follow-up with neurology in 4 to 6 weeks. I have personally performed a face to face diagnostic evaluation on this patient, reviewed all data and investigations, and am the sole provider of all clinical decisions on the neurological status of this patient. no new changes, nonfocal, general weakness,       Drew MARY GRACE.  Fela Givens MD, Dariela Steiner, American Board of Psychiatry & Neurology  Board Certified in Vascular Neurology  Board Certified in Neuromuscular Medicine  Certified in Neurorehabilitation       Collaborating physicians: Dr Fela Givens          Electronically signed by Enzo Santiago PA-C on 8/21/2021 at 11:10 AM

## 2021-08-22 NOTE — PROGRESS NOTES
Physical Therapy  Fac[facility] [id number]/[id number]  Physical Therapy Discharge      NAME: Elizabeth Calderon    : 1975 (55 y.o.)  MRN: [id number]    Account: [de-identified]  Gender: female      Patient has been discharged from acute care hospital. DC patient from current PT program.      Electronically signed by Marlene Stack PT on 21 at 4:03 PM EDT

## 2021-08-23 LAB
GROWTH HORMONE: 0.15 NG/ML (ref 0.05–8)
THYROID PEROXIDASE (TPO) ABS: 6.2 IU/ML (ref 0–25)

## 2021-08-31 NOTE — PROGRESS NOTES
Physician Progress Note      PATIENT:               Sandy Senior  CSN #:                  [id number]  :                       1975  ADMIT DATE:       2021 2:17 PM  [address] [place] DATE:        2021 12:09 PM  RESPONDING  PROVIDER #:        Keith ORTA DO          QUERY TEXT:    Patient admitted with stroke like symptom-right-sided numbness and tingling,   gait ataxia . Patient noted to have hypothyroidism, myopathy and pituitary   macroadenoma. If possible, please document in progress notes and discharge   summary the relationship, if any, between gait ataxia and hypothyroidism, or   myopathy due to pituitary macroadenoma. The medical record reflects the following:  Risk Factors: pituitary macroadenoma  hypothyroidism cocaine abuse  Clinical Indicators: per ER assessment: presents to the emergency department 1   week history of right-sided numbness and tingling as well as ataxic gait.    461  288 per neurology consult Dr Socorro Lemus: \"Gait ataxia   secondary to severe hypothyroidism and secondary myopathy due to medical   noncompliance in a patient with known pituitary macroadenoma. \" thyroxine free   0.20  Treatment: MRI MRA  brain neuro checks stroke protocol  neurology   endocrinology consult IVF-    Thank you,  Herbert ELYN RN [facility]   M-F 6am-2pm  Options provided:  -- gait ataxia due to hypothyroidism  -- gait ataxia due to myopathy in patient with known pituitary macroadenoma  -- Other - I will add my own diagnosis  -- Disagree - Not applicable / Not valid  -- Disagree - Clinically unable to determine / Unknown  -- Refer to Clinical Documentation Reviewer    PROVIDER RESPONSE TEXT:    This patient has gait ataxia due to hypothyroidism.     Query created by: Betty Lassiter on 2021 1:56 PM      Electronically signed by:  Amirah Nogueira DO 2021 2:00 PM

## 2021-09-20 PROBLEM — S62.638A CLOSED FRACTURE OF DISTAL PHALANX OF LITTLE FINGER: Status: ACTIVE | Noted: 2021-09-20

## 2021-09-20 PROBLEM — Z76.0 ENCOUNTER FOR ISSUE OF REPEAT PRESCRIPTION: Status: ACTIVE | Noted: 2019-05-03

## 2021-09-20 PROBLEM — M54.50 ACUTE LOW BACK PAIN: Status: ACTIVE | Noted: 2021-09-20

## 2021-09-20 PROBLEM — Z79.82 LONG TERM (CURRENT) USE OF ASPIRIN: Status: ACTIVE | Noted: 2019-05-03

## 2021-09-20 PROBLEM — Z79.899 OTHER LONG TERM (CURRENT) DRUG THERAPY: Status: ACTIVE | Noted: 2019-05-03

## 2021-09-20 PROBLEM — R11.2 NAUSEA AND VOMITING: Status: ACTIVE | Noted: 2021-09-20

## 2021-11-09 RX ORDER — LEVOTHYROXINE SODIUM 0.12 MG/1
250 TABLET ORAL DAILY
Qty: 10 TABLET | Refills: 0 | Status: SHIPPED | OUTPATIENT
Start: 2021-11-09 | End: 2022-01-07 | Stop reason: SDUPTHER

## 2021-12-29 NOTE — TELEPHONE ENCOUNTER
Patient is requesting medication refill.  Please approve or deny this request.    Rx requested:  Requested Prescriptions     Pending Prescriptions Disp Refills    levothyroxine (SYNTHROID) 125 MCG tablet 10 tablet 0     Sig: Take 2 tablets by mouth daily for 9 days         Last Office Visit:   Visit date not found      Next Visit Date:  Future Appointments   Date Time Provider Kehinde Leahy   1/4/2022 11:40 AM Ciarra Gurrola PA-C [facility]

## 2021-12-30 ENCOUNTER — APPOINTMENT (OUTPATIENT)
Dept: CT IMAGING | Age: 46
End: 2021-12-30
Payer: COMMERCIAL

## 2021-12-30 ENCOUNTER — HOSPITAL ENCOUNTER (EMERGENCY)
Age: 46
Discharge: LEFT BEFORE DISCHARGE | End: 2021-12-30
Payer: COMMERCIAL

## 2021-12-30 VITALS
SYSTOLIC BLOOD PRESSURE: 136 MMHG | RESPIRATION RATE: 19 BRPM | TEMPERATURE: 97.3 F | BODY MASS INDEX: 28.35 KG/M2 | HEIGHT: 63 IN | DIASTOLIC BLOOD PRESSURE: 90 MMHG | OXYGEN SATURATION: 97 % | WEIGHT: 160 LBS | HEART RATE: 94 BPM

## 2021-12-30 LAB
ALBUMIN SERPL-MCNC: 4.5 G/DL (ref 3.5–4.6)
ALP BLD-CCNC: 58 U/L (ref 40–130)
ALT SERPL-CCNC: 40 U/L (ref 0–33)
ANION GAP SERPL CALCULATED.3IONS-SCNC: 10 MEQ/L (ref 9–15)
AST SERPL-CCNC: 46 U/L (ref 0–35)
BASOPHILS ABSOLUTE: 0.1 K/UL (ref 0–0.2)
BASOPHILS RELATIVE PERCENT: 1.1 %
BILIRUB SERPL-MCNC: <0.2 MG/DL (ref 0.2–0.7)
BUN BLDV-MCNC: 14 MG/DL (ref 6–20)
CALCIUM SERPL-MCNC: 9 MG/DL (ref 8.5–9.9)
CHLORIDE BLD-SCNC: 102 MEQ/L (ref 95–107)
CO2: 28 MEQ/L (ref 20–31)
CREAT SERPL-MCNC: 0.89 MG/DL (ref 0.5–0.9)
EOSINOPHILS ABSOLUTE: 0.2 K/UL (ref 0–0.7)
EOSINOPHILS RELATIVE PERCENT: 1.8 %
GFR AFRICAN AMERICAN: >60
GFR NON-AFRICAN AMERICAN: >60
GLOBULIN: 2.8 G/DL (ref 2.3–3.5)
GLUCOSE BLD-MCNC: 80 MG/DL (ref 70–99)
HCT VFR BLD CALC: 44.3 % (ref 37–47)
HEMOGLOBIN: 15 G/DL (ref 12–16)
LYMPHOCYTES ABSOLUTE: 2.1 K/UL (ref 1–4.8)
LYMPHOCYTES RELATIVE PERCENT: 23.8 %
MCH RBC QN AUTO: 30.6 PG (ref 27–31.3)
MCHC RBC AUTO-ENTMCNC: 33.9 % (ref 33–37)
MCV RBC AUTO: 90.2 FL (ref 82–100)
MONOCYTES ABSOLUTE: 0.4 K/UL (ref 0.2–0.8)
MONOCYTES RELATIVE PERCENT: 4.3 %
NEUTROPHILS ABSOLUTE: 6.2 K/UL (ref 1.4–6.5)
NEUTROPHILS RELATIVE PERCENT: 69 %
PDW BLD-RTO: 14.3 % (ref 11.5–14.5)
PLATELET # BLD: 337 K/UL (ref 130–400)
POTASSIUM SERPL-SCNC: 3.7 MEQ/L (ref 3.4–4.9)
RBC # BLD: 4.92 M/UL (ref 4.2–5.4)
SODIUM BLD-SCNC: 140 MEQ/L (ref 135–144)
TOTAL PROTEIN: 7.3 G/DL (ref 6.3–8)
TSH SERPL DL<=0.05 MIU/L-ACNC: 549.1 UIU/ML (ref 0.44–3.86)
WBC # BLD: 9 K/UL (ref 4.8–10.8)

## 2021-12-30 PROCEDURE — 36415 COLL VENOUS BLD VENIPUNCTURE: CPT

## 2021-12-30 PROCEDURE — 4500000002 HC ER NO CHARGE

## 2021-12-30 PROCEDURE — 70450 CT HEAD/BRAIN W/O DYE: CPT

## 2021-12-30 PROCEDURE — 80053 COMPREHEN METABOLIC PANEL: CPT

## 2021-12-30 PROCEDURE — 85025 COMPLETE CBC W/AUTO DIFF WBC: CPT

## 2021-12-30 PROCEDURE — 84443 ASSAY THYROID STIM HORMONE: CPT

## 2021-12-30 RX ORDER — LEVOTHYROXINE SODIUM 0.12 MG/1
250 TABLET ORAL DAILY
Qty: 10 TABLET | Refills: 0 | OUTPATIENT
Start: 2021-12-30 | End: 2022-01-08

## 2021-12-30 ASSESSMENT — PAIN DESCRIPTION - ONSET: ONSET: ON-GOING

## 2021-12-30 ASSESSMENT — PAIN DESCRIPTION - LOCATION: LOCATION: HEAD

## 2021-12-30 ASSESSMENT — PAIN DESCRIPTION - PROGRESSION: CLINICAL_PROGRESSION: GRADUALLY WORSENING

## 2021-12-30 ASSESSMENT — PAIN DESCRIPTION - PAIN TYPE: TYPE: ACUTE PAIN

## 2021-12-30 ASSESSMENT — PAIN SCALES - GENERAL: PAINLEVEL_OUTOF10: 10

## 2021-12-30 ASSESSMENT — PAIN DESCRIPTION - FREQUENCY: FREQUENCY: CONTINUOUS

## 2021-12-30 ASSESSMENT — PAIN DESCRIPTION - DESCRIPTORS: DESCRIPTORS: HEADACHE

## 2021-12-30 NOTE — ED TRIAGE NOTES
Pt presents to ED from home with c/o headache. Pt states that that she has an enlarged pituitary gland, that causes headaches and blurred vision in left eye. Pt reports that she noted a h/a and blurred vision 3 days ago, that has become increasingly worse and is associated with nausea. Upon assessment, pt is A/Ox4, skin p/w/d, resp even and unlabored, msp's intact. Pt denies, cp, sob, v/d, fever, or chills.

## 2021-12-30 NOTE — TELEPHONE ENCOUNTER
Patient has not shown up to 3 appointments, an Synthroid was only prescribed as a courtesy until she could be seen be endocrinology.

## 2021-12-30 NOTE — TELEPHONE ENCOUNTER
Patient called and I advised that the synthroid was done as a courtesy and I advised that she needed a appointment with felipe and she stated that she did not know who that was so I suggested that she call her PCP and she said that she thought that Morgan Marino was her PCP and I stated that she is not, and that we are neurology and we don't prescribe synthroid  and she said, Fine ill just go to the hospital

## 2022-01-05 RX ORDER — LEVOTHYROXINE SODIUM 0.12 MG/1
250 TABLET ORAL DAILY
Qty: 10 TABLET | Refills: 0 | OUTPATIENT
Start: 2022-01-05 | End: 2022-01-10

## 2022-01-05 NOTE — TELEPHONE ENCOUNTER
Requested Prescriptions     Pending Prescriptions Disp Refills    levothyroxine (SYNTHROID) 125 MCG tablet 10 tablet 0     Sig: Take 2 tablets by mouth daily for 5 days

## 2022-01-07 ENCOUNTER — APPOINTMENT (OUTPATIENT)
Dept: CT IMAGING | Age: 47
End: 2022-01-07
Payer: COMMERCIAL

## 2022-01-07 ENCOUNTER — HOSPITAL ENCOUNTER (EMERGENCY)
Age: 47
Discharge: HOME | End: 2022-01-07
Attending: EMERGENCY MEDICINE
Payer: COMMERCIAL

## 2022-01-07 VITALS
RESPIRATION RATE: 19 BRPM | BODY MASS INDEX: 29.06 KG/M2 | HEIGHT: 63 IN | DIASTOLIC BLOOD PRESSURE: 100 MMHG | TEMPERATURE: 98.1 F | SYSTOLIC BLOOD PRESSURE: 150 MMHG | HEART RATE: 73 BPM | WEIGHT: 164 LBS | OXYGEN SATURATION: 100 %

## 2022-01-07 DIAGNOSIS — R51.9 ACUTE NONINTRACTABLE HEADACHE, UNSPECIFIED HEADACHE TYPE: Primary | ICD-10-CM

## 2022-01-07 LAB
ALBUMIN SERPL-MCNC: 4.4 G/DL (ref 3.5–4.6)
ALP BLD-CCNC: 61 U/L (ref 40–130)
ALT SERPL-CCNC: 45 U/L (ref 0–33)
AMPHETAMINE SCREEN, URINE: ABNORMAL
ANION GAP SERPL CALCULATED.3IONS-SCNC: 12 MEQ/L (ref 9–15)
AST SERPL-CCNC: 32 U/L (ref 0–35)
BARBITURATE SCREEN URINE: ABNORMAL
BASOPHILS ABSOLUTE: 0.1 K/UL (ref 0–0.2)
BASOPHILS RELATIVE PERCENT: 0.9 %
BENZODIAZEPINE SCREEN, URINE: ABNORMAL
BILIRUB SERPL-MCNC: <0.2 MG/DL (ref 0.2–0.7)
BUN BLDV-MCNC: 20 MG/DL (ref 6–20)
CALCIUM SERPL-MCNC: 9.2 MG/DL (ref 8.5–9.9)
CANNABINOID SCREEN URINE: ABNORMAL
CHLORIDE BLD-SCNC: 100 MEQ/L (ref 95–107)
CO2: 25 MEQ/L (ref 20–31)
COCAINE METABOLITE SCREEN URINE: POSITIVE
CREAT SERPL-MCNC: 1.04 MG/DL (ref 0.5–0.9)
EOSINOPHILS ABSOLUTE: 0.1 K/UL (ref 0–0.7)
EOSINOPHILS RELATIVE PERCENT: 1.3 %
ETHANOL PERCENT: NORMAL G/DL
ETHANOL: <10 MG/DL (ref 0–0.08)
GFR AFRICAN AMERICAN: >60
GFR NON-AFRICAN AMERICAN: 56.8
GLOBULIN: 2.7 G/DL (ref 2.3–3.5)
GLUCOSE BLD-MCNC: 109 MG/DL (ref 70–99)
HCT VFR BLD CALC: 41.7 % (ref 37–47)
HEMOGLOBIN: 14.2 G/DL (ref 12–16)
LYMPHOCYTES ABSOLUTE: 3.2 K/UL (ref 1–4.8)
LYMPHOCYTES RELATIVE PERCENT: 38.3 %
Lab: ABNORMAL
MCH RBC QN AUTO: 30.7 PG (ref 27–31.3)
MCHC RBC AUTO-ENTMCNC: 33.9 % (ref 33–37)
MCV RBC AUTO: 90.5 FL (ref 82–100)
METHADONE SCREEN, URINE: ABNORMAL
MONOCYTES ABSOLUTE: 0.4 K/UL (ref 0.2–0.8)
MONOCYTES RELATIVE PERCENT: 4.6 %
NEUTROPHILS ABSOLUTE: 4.5 K/UL (ref 1.4–6.5)
NEUTROPHILS RELATIVE PERCENT: 54.9 %
OPIATE SCREEN URINE: ABNORMAL
OXYCODONE URINE: ABNORMAL
PDW BLD-RTO: 14.9 % (ref 11.5–14.5)
PHENCYCLIDINE SCREEN URINE: ABNORMAL
PLATELET # BLD: 327 K/UL (ref 130–400)
POTASSIUM SERPL-SCNC: 3.1 MEQ/L (ref 3.4–4.9)
PROPOXYPHENE SCREEN: ABNORMAL
RBC # BLD: 4.61 M/UL (ref 4.2–5.4)
SODIUM BLD-SCNC: 137 MEQ/L (ref 135–144)
T4 FREE: <0.03 NG/DL (ref 0.84–1.68)
TOTAL PROTEIN: 7.1 G/DL (ref 6.3–8)
TSH SERPL DL<=0.05 MIU/L-ACNC: 779.2 UIU/ML (ref 0.44–3.86)
WBC # BLD: 8.2 K/UL (ref 4.8–10.8)

## 2022-01-07 PROCEDURE — 84443 ASSAY THYROID STIM HORMONE: CPT

## 2022-01-07 PROCEDURE — 36415 COLL VENOUS BLD VENIPUNCTURE: CPT

## 2022-01-07 PROCEDURE — 80053 COMPREHEN METABOLIC PANEL: CPT

## 2022-01-07 PROCEDURE — 82077 ASSAY SPEC XCP UR&BREATH IA: CPT

## 2022-01-07 PROCEDURE — 70450 CT HEAD/BRAIN W/O DYE: CPT

## 2022-01-07 PROCEDURE — 96374 THER/PROPH/DIAG INJ IV PUSH: CPT

## 2022-01-07 PROCEDURE — 85025 COMPLETE CBC W/AUTO DIFF WBC: CPT

## 2022-01-07 PROCEDURE — 6370000000 HC RX 637 (ALT 250 FOR IP): Performed by: EMERGENCY MEDICINE

## 2022-01-07 PROCEDURE — 84439 ASSAY OF FREE THYROXINE: CPT

## 2022-01-07 PROCEDURE — 6360000002 HC RX W HCPCS: Performed by: EMERGENCY MEDICINE

## 2022-01-07 PROCEDURE — 99284 EMERGENCY DEPT VISIT MOD MDM: CPT

## 2022-01-07 PROCEDURE — 2580000003 HC RX 258: Performed by: EMERGENCY MEDICINE

## 2022-01-07 PROCEDURE — 96375 TX/PRO/DX INJ NEW DRUG ADDON: CPT

## 2022-01-07 PROCEDURE — 80307 DRUG TEST PRSMV CHEM ANLYZR: CPT

## 2022-01-07 RX ORDER — LEVOTHYROXINE SODIUM 0.12 MG/1
250 TABLET ORAL ONCE
Status: COMPLETED | OUTPATIENT
Start: 2022-01-07 | End: 2022-01-07

## 2022-01-07 RX ORDER — LEVOTHYROXINE SODIUM 0.12 MG/1
250 TABLET ORAL DAILY
Qty: 60 TABLET | Refills: 0 | Status: SHIPPED | OUTPATIENT
Start: 2022-01-07 | End: 2022-02-06

## 2022-01-07 RX ORDER — METOCLOPRAMIDE 10 MG/1
10 TABLET ORAL 2 TIMES DAILY PRN
Qty: 60 TABLET | Refills: 0 | Status: SHIPPED | OUTPATIENT
Start: 2022-01-07

## 2022-01-07 RX ORDER — KETOROLAC TROMETHAMINE 30 MG/ML
30 INJECTION, SOLUTION INTRAMUSCULAR; INTRAVENOUS ONCE
Status: COMPLETED | OUTPATIENT
Start: 2022-01-07 | End: 2022-01-07

## 2022-01-07 RX ORDER — DIPHENHYDRAMINE HYDROCHLORIDE 50 MG/ML
25 INJECTION INTRAMUSCULAR; INTRAVENOUS ONCE
Status: COMPLETED | OUTPATIENT
Start: 2022-01-07 | End: 2022-01-07

## 2022-01-07 RX ORDER — DEXAMETHASONE SODIUM PHOSPHATE 4 MG/ML
4 INJECTION, SOLUTION INTRA-ARTICULAR; INTRALESIONAL; INTRAMUSCULAR; INTRAVENOUS; SOFT TISSUE ONCE
Status: DISCONTINUED | OUTPATIENT
Start: 2022-01-07 | End: 2022-01-07 | Stop reason: HOSPADM

## 2022-01-07 RX ORDER — IBUPROFEN 800 MG/1
800 TABLET ORAL 2 TIMES DAILY PRN
Qty: 180 TABLET | Refills: 1 | Status: SHIPPED | OUTPATIENT
Start: 2022-01-07

## 2022-01-07 RX ORDER — ACETAMINOPHEN 500 MG
1000 TABLET ORAL ONCE
Status: COMPLETED | OUTPATIENT
Start: 2022-01-07 | End: 2022-01-07

## 2022-01-07 RX ORDER — METOCLOPRAMIDE HYDROCHLORIDE 5 MG/ML
10 INJECTION INTRAMUSCULAR; INTRAVENOUS ONCE
Status: COMPLETED | OUTPATIENT
Start: 2022-01-07 | End: 2022-01-07

## 2022-01-07 RX ORDER — 0.9 % SODIUM CHLORIDE 0.9 %
1000 INTRAVENOUS SOLUTION INTRAVENOUS ONCE
Status: COMPLETED | OUTPATIENT
Start: 2022-01-07 | End: 2022-01-07

## 2022-01-07 RX ADMIN — SODIUM CHLORIDE 1000 ML: 9 INJECTION, SOLUTION INTRAVENOUS at 16:32

## 2022-01-07 RX ADMIN — METOCLOPRAMIDE HYDROCHLORIDE 10 MG: 5 INJECTION INTRAMUSCULAR; INTRAVENOUS at 16:32

## 2022-01-07 RX ADMIN — ACETAMINOPHEN 1000 MG: 500 TABLET ORAL at 16:32

## 2022-01-07 RX ADMIN — LEVOTHYROXINE SODIUM 250 MCG: 0.12 TABLET ORAL at 16:40

## 2022-01-07 RX ADMIN — KETOROLAC TROMETHAMINE 30 MG: 30 INJECTION, SOLUTION INTRAMUSCULAR at 16:32

## 2022-01-07 RX ADMIN — DIPHENHYDRAMINE HYDROCHLORIDE 25 MG: 50 INJECTION, SOLUTION INTRAMUSCULAR; INTRAVENOUS at 16:32

## 2022-01-07 ASSESSMENT — ENCOUNTER SYMPTOMS
BACK PAIN: 0
SHORTNESS OF BREATH: 0
SORE THROAT: 0
VOMITING: 0
NAUSEA: 0
ABDOMINAL PAIN: 0
DIARRHEA: 0
COUGH: 0

## 2022-01-07 ASSESSMENT — PAIN SCALES - GENERAL
PAINLEVEL_OUTOF10: 10
PAINLEVEL_OUTOF10: 10

## 2022-01-07 ASSESSMENT — PAIN DESCRIPTION - DESCRIPTORS: DESCRIPTORS: PATIENT UNABLE TO DESCRIBE

## 2022-01-07 ASSESSMENT — PAIN DESCRIPTION - FREQUENCY: FREQUENCY: CONTINUOUS

## 2022-01-07 ASSESSMENT — PAIN DESCRIPTION - PAIN TYPE: TYPE: ACUTE PAIN

## 2022-01-07 ASSESSMENT — PAIN DESCRIPTION - LOCATION: LOCATION: HEAD

## 2022-01-07 ASSESSMENT — PAIN DESCRIPTION - ONSET: ONSET: ON-GOING

## 2022-01-07 ASSESSMENT — PAIN DESCRIPTION - PROGRESSION: CLINICAL_PROGRESSION: GRADUALLY WORSENING

## 2022-01-07 NOTE — ED TRIAGE NOTES
Pt presents to ED from home with c/o headache. Pt states that that she has an enlarged pituitary gland, that causes headaches and blurred vision in left eye. Pt reports that she noted a h/a and blurred vision 1 week ago, that has become increasingly worse and is associated with nausea. Upon assessment, pt is A/Ox4, skin p/w/d, resp even and unlabored, msp's intact. Pt denies, cp, sob, v/d, fever, or chills. Pt came to ED on 12/30 and waited in ED waiting room for 3-4 hours and left w/o being seen. TSH at that visit 549.1.

## 2022-01-07 NOTE — ED PROVIDER NOTES
[address] [facility] ED  eMERGENCYdEPARTMENT eNCOUnter      Pt Name: Beryle Birmingham  MRN: [id number]  [place] 1975  Date of evaluation: 1/7/2022  Martin Hammans, MD    CHIEF COMPLAINT           HPI  Beryle Birmingham is a 55 y.o. female per chart review has a h/o pituitary adenoma, hypothyroidism presents to the ED with headache. Pt notes gradual onset, moderate, constant, aching, L sided headache x 1 week. Pt notes she ran out of her synthroid 1 month ago. Pt denies fever, n/v, cp, sob, ab pain, dysuria, diarrhea. Pt was admitted to the hospital 8/21 for a possible CVA and found to have a pituitary adenoma. ROS  Review of Systems   Constitutional: Negative for activity change, chills and fever. HENT: Negative for ear pain and sore throat. Eyes: Negative for visual disturbance. Respiratory: Negative for cough and shortness of breath. Cardiovascular: Negative for chest pain, palpitations and leg swelling. Gastrointestinal: Negative for abdominal pain, diarrhea, nausea and vomiting. Genitourinary: Negative for dysuria. Musculoskeletal: Negative for back pain. Skin: Negative for rash. Neurological: Positive for headaches. Negative for dizziness and weakness. Except as noted above the remainder of the review of systems was reviewed and negative.        PAST MEDICAL HISTORY     Past Medical History:   Diagnosis Date    Hypertension     Thyroid disease          SURGICAL HISTORY       Past Surgical History:   Procedure Laterality Date    CHOLECYSTECTOMY      TUBAL LIGATION           CURRENTMEDICATIONS       Previous Medications    ASPIRIN EC 81 MG EC TABLET    Take 81 mg by mouth daily    CABERGOLINE (DOSTINEX) 0.5 MG TABLET    Take 1 tablet by mouth Twice a Week    HYDROCHLOROTHIAZIDE (HYDRODIURIL) 25 MG TABLET    Take 1 tablet by mouth daily for 7 days    NAPROXEN (NAPROSYN) 500 MG TABLET    Take 1 tablet by mouth 2 times daily for 20 doses    ONDANSETRON (ZOFRAN ODT) 4 MG DISINTEGRATING TABLET    Take 1-2 tablets by mouth every 12 hours as needed for Nausea May Sub regular tablet (non-ODT) if insurance does not cover ODT. ROSUVASTATIN (CRESTOR) 40 MG TABLET    Take 1 tablet by mouth nightly       ALLERGIES     Patient has no known allergies. FAMILY HISTORY     History reviewed. No pertinent family history. SOCIAL HISTORY       Social History     Socioeconomic History    Marital status: Single     Spouse name: None    Number of children: None    Years of education: None    Highest education level: None   Occupational History    None   Tobacco Use    Smoking status: Current Every Day Smoker     Packs/day: 1.00     Years: 23.00     Pack years: 23.00    Smokeless tobacco: Never Used   Vaping Use    Vaping Use: Never used   Substance and Sexual Activity    Alcohol use: Yes     Comment: 3 bud ice 24oz    Drug use: Yes     Types: Cocaine     Comment: last use yesterday    Sexual activity: Not Currently   Other Topics Concern    None   Social History Narrative    None     Social Determinants of Health     Financial Resource Strain:     Difficulty of Paying Living Expenses: Not on file   Food Insecurity:     Worried About Running Out of Food in the Last Year: Not on file    Veronica of Food in the Last Year: Not on file   Transportation Needs:     Lack of Transportation (Medical): Not on file    Lack of Transportation (Non-Medical):  Not on file   Physical Activity:     Days of Exercise per Week: Not on file    Minutes of Exercise per Session: Not on file   Stress:     Feeling of Stress : Not on file   Social Connections:     Frequency of Communication with Friends and Family: Not on file    Frequency of Social Gatherings with Friends and Family: Not on file    Attends Holiness Services: Not on file    Active Member of Clubs or Organizations: Not on file    Attends Club or Organization Meetings: Not on file    Marital Status: Not on file   Intimate Partner Violence:     Fear of Current or Ex-Partner: Not on file    Emotionally Abused: Not on file    Physically Abused: Not on file    Sexually Abused: Not on file   Housing Stability:     Unable to Pay for Housing in the Last Year: Not on file    Number of [place] in the Last Year: Not on file    Unstable Housing in the Last Year: Not on file         PHYSICAL EXAM       ED Triage Vitals   BP Temp Temp Source Pulse Resp SpO2 Height Weight   01/07/22 1551 01/07/22 1549 01/07/22 1549 01/07/22 1549 01/07/22 1549 01/07/22 1549 01/07/22 1549 01/07/22 1549   (!) 150/100 98.1 °F (36.7 °C) Temporal 73 19 100 % 5' 3\" (1.6 m) 164 lb (74.4 kg)       Physical Exam  Vitals and nursing note reviewed. Constitutional:       Appearance: She is well-developed. HENT:      Head: Normocephalic. Right Ear: External ear normal.      Left Ear: External ear normal.   Eyes:      Conjunctiva/sclera: Conjunctivae normal.      Pupils: Pupils are equal, round, and reactive to light. Cardiovascular:      Rate and Rhythm: Normal rate and regular rhythm. Heart sounds: Normal heart sounds. Pulmonary:      Effort: Pulmonary effort is normal.      Breath sounds: Normal breath sounds. Abdominal:      General: Bowel sounds are normal. There is no distension. Palpations: Abdomen is soft. Tenderness: There is no abdominal tenderness. Musculoskeletal:         General: Normal range of motion. Cervical back: Normal range of motion and neck supple. No rigidity. Skin:     General: Skin is warm and dry. Neurological:      Mental Status: She is alert and oriented to person, place, and time. Psychiatric:         Mood and Affect: Mood normal.           MDM  54 yo female presents to the ED with headache. Pt is afebrile, hemodynamically stable. Pt given 1 L NS, IV reglan, IV benadryl, IV toradol, PO tylenol, IV decadron in the ED. Pt given her PO synthroid in the ED. Labs remarkable for .    CT head negative. Pt reassessed and feeling much better. Pt's TSH likely elevated due to noncompliance with synthroid. Pt educated about headache and hypothyroidism. Pt able to tolerate PO. Pt given prescription for reglan, ibuprofen. Pt will f/uw with pcp and endocrine. Pt understands plan. FINAL IMPRESSION      1.  Acute nonintractable headache, unspecified headache type          DISPOSITION/PLAN   DISPOSITION Decision To Discharge 01/07/2022 05:40:43 PM        DISCHARGE MEDICATIONS:  [unfilled]         Jose So MD(electronically signed)  Attending Emergency Physician            Jose So MD  01/07/22 [phone]

## 2022-04-21 ENCOUNTER — HOSPITAL ENCOUNTER (EMERGENCY)
Age: 47
Discharge: HOME | End: 2022-04-21
Attending: EMERGENCY MEDICINE
Payer: COMMERCIAL

## 2022-04-21 VITALS
TEMPERATURE: 98 F | OXYGEN SATURATION: 99 % | HEART RATE: 68 BPM | WEIGHT: 145 LBS | HEIGHT: 63 IN | RESPIRATION RATE: 16 BRPM | DIASTOLIC BLOOD PRESSURE: 85 MMHG | SYSTOLIC BLOOD PRESSURE: 131 MMHG | BODY MASS INDEX: 25.69 KG/M2

## 2022-04-21 DIAGNOSIS — Z76.0 ENCOUNTER FOR MEDICATION REFILL: Primary | ICD-10-CM

## 2022-04-21 DIAGNOSIS — E03.9 HYPOTHYROIDISM, UNSPECIFIED TYPE: ICD-10-CM

## 2022-04-21 PROCEDURE — 99283 EMERGENCY DEPT VISIT LOW MDM: CPT

## 2022-04-21 RX ORDER — LEVOTHYROXINE SODIUM 0.12 MG/1
250 TABLET ORAL DAILY
Qty: 60 TABLET | Refills: 1 | Status: SHIPPED | OUTPATIENT
Start: 2022-04-21

## 2022-04-21 RX ORDER — LEVOTHYROXINE SODIUM 0.12 MG/1
250 TABLET ORAL DAILY
Qty: 60 TABLET | Refills: 1 | Status: SHIPPED | OUTPATIENT
Start: 2022-04-21 | End: 2022-04-21 | Stop reason: SDUPTHER

## 2022-04-21 NOTE — ED PROVIDER NOTES
[address] [facility] ED  EMERGENCY DEPARTMENT ENCOUNTER      Pt Name: Jenna Villa  MRN: [id number]  [place] 1975  Date of evaluation: 4/21/2022  Provider: Lovely Zhong MD    CHIEF COMPLAINT       Chief Complaint   Patient presents with    Medication Refill     out of thyroid medication for the last 2 weeks 250mg synthroid     Other     wants mole on face that appeared 6 months ago          HISTORY OF PRESENT ILLNESS   (Location/Symptom, Timing/Onset, Context/Setting, Quality, Duration, Modifying Factors, Severity)  Note limiting factors. Jenna Villa is a 52 y.o. female who presents to the emergency department for medication refill. Has a history of hypothyroidism, recently ran out of her medication 2 weeks ago. She is simply requesting to have her medications sent to the pharmacy. She tried to go to her physician but was unable to get an appointment. She actually denies any acute medical complaint at this time. HPI    Nursing Notes were reviewed. REVIEW OF SYSTEMS    (2-9 systems for level 4, 10 or more for level 5)     Review of Systems   Constitutional:        Medication refill   All other systems reviewed and are negative. Except as noted above the remainder of the review of systems was reviewed and negative.        PAST MEDICAL HISTORY     Past Medical History:   Diagnosis Date    Hypertension     Thyroid disease          SURGICAL HISTORY       Past Surgical History:   Procedure Laterality Date    CHOLECYSTECTOMY      TUBAL LIGATION           CURRENT MEDICATIONS       Current Discharge Medication List      CONTINUE these medications which have NOT CHANGED    Details   metoclopramide (REGLAN) 10 MG tablet Take 1 tablet by mouth 2 times daily as needed (Headache)  Qty: 60 tablet, Refills: 0      ibuprofen (ADVIL;MOTRIN) 800 MG tablet Take 1 tablet by mouth 2 times daily as needed for Pain  Qty: 180 tablet, Refills: 1      rosuvastatin (CRESTOR) 40 MG tablet Take 1 tablet by mouth nightly  Qty: 30 tablet, Refills: 3      cabergoline (DOSTINEX) 0.5 MG tablet Take 1 tablet by mouth Twice a Week  Qty: 8 tablet, Refills: 3      hydroCHLOROthiazide (HYDRODIURIL) 25 MG tablet Take 1 tablet by mouth daily for 7 days  Qty: 7 tablet, Refills: 0      ondansetron (ZOFRAN ODT) 4 MG disintegrating tablet Take 1-2 tablets by mouth every 12 hours as needed for Nausea May Sub regular tablet (non-ODT) if insurance does not cover ODT. Qty: 12 tablet, Refills: 0      naproxen (NAPROSYN) 500 MG tablet Take 1 tablet by mouth 2 times daily for 20 doses  Qty: 20 tablet, Refills: 0      aspirin EC 81 MG EC tablet Take 81 mg by mouth daily             ALLERGIES     Patient has no known allergies. FAMILY HISTORY     History reviewed. No pertinent family history. SOCIAL HISTORY       Social History     Socioeconomic History    Marital status: Single     Spouse name: None    Number of children: None    Years of education: None    Highest education level: None   Occupational History    None   Tobacco Use    Smoking status: Current Every Day Smoker     Packs/day: 0.50     Years: 23.00     Pack years: 11.50    Smokeless tobacco: Never Used   Vaping Use    Vaping Use: Never used   Substance and Sexual Activity    Alcohol use: Yes     Comment: 3xwk    Drug use: Not Currently     Types: Cocaine    Sexual activity: None   Other Topics Concern    None   Social History Narrative    None     Social Determinants of Health     Financial Resource Strain:     Difficulty of Paying Living Expenses: Not on file   Food Insecurity:     Worried About Running Out of Food in the Last Year: Not on file    Veronica of Food in the Last Year: Not on file   Transportation Needs:     Lack of Transportation (Medical): Not on file    Lack of Transportation (Non-Medical):  Not on file   Physical Activity:     Days of Exercise per Week: Not on file    Minutes of Exercise per Session: Not on file   Stress:     Feeling of Stress : Not on file   Social Connections:     Frequency of Communication with Friends and Family: Not on file    Frequency of Social Gatherings with Friends and Family: Not on file    Attends Taoist Services: Not on file    Active Member of Clubs or Organizations: Not on file    Attends Club or Organization Meetings: Not on file    Marital Status: Not on file   Intimate Partner Violence:     Fear of Current or Ex-Partner: Not on file    Emotionally Abused: Not on file    Physically Abused: Not on file    Sexually Abused: Not on file   Housing Stability:     Unable to Pay for Housing in the Last Year: Not on file    Number of [place] in the Last Year: Not on file    Unstable Housing in the Last Year: Not on file       SCREENINGS         [place] Coma Scale  Eye Opening: Spontaneous  Best Verbal Response: Oriented  Best Motor Response: Obeys commands  Ed Coma Scale Score: 15                     CIWA Assessment  BP: 131/85  Pulse: 68                 PHYSICAL EXAM    (up to 7 for level 4, 8 or more for level 5)     ED Triage Vitals [04/21/22 1653]   BP Temp Temp Source Pulse Resp SpO2 Height Weight   131/85 98 °F (36.7 °C) Temporal 68 16 99 % 5' 3\" (1.6 m) 145 lb (65.8 kg)       Physical Exam  Constitutional:       General: She is not in acute distress. Appearance: She is not toxic-appearing or diaphoretic. HENT:      Nose: Nose normal.      Mouth/Throat:      Mouth: Mucous membranes are moist.   Eyes:      General: No scleral icterus. Conjunctiva/sclera: Conjunctivae normal.   Cardiovascular:      Rate and Rhythm: Normal rate and regular rhythm. Pulmonary:      Effort: Pulmonary effort is normal.      Breath sounds: Normal breath sounds. Abdominal:      General: There is no distension. Tenderness: There is no abdominal tenderness. There is no guarding or rebound. Musculoskeletal:      Right lower leg: No edema. Left lower leg: No edema.    Skin:     Capillary Refill: Capillary refill takes less than 2 seconds. Neurological:      General: No focal deficit present. Mental Status: She is alert and oriented to person, place, and time. Psychiatric:         Mood and Affect: Mood normal.             RADIOLOGY:   Non-plain film images such as CT, Ultrasound and MRI are read by the radiologist. Plain radiographic images are visualized and preliminarily interpreted by the emergency physician with the below findings:      Interpretation per the Radiologist below, if available at the time of this note:    No orders to display         ED BEDSIDE ULTRASOUND:   Performed by ED Physician - none    LABS:  Labs Reviewed - No data to display    All other labs were within normal range or not returned as of this dictation. EMERGENCY DEPARTMENT COURSE and DIFFERENTIAL DIAGNOSIS/MDM:   Vitals:    Vitals:    04/21/22 1653   BP: 131/85   Pulse: 68   Resp: 16   Temp: 98 °F (36.7 °C)   TempSrc: Temporal   SpO2: 99%   Weight: 145 lb (65.8 kg)   Height: 5' 3\" (1.6 m)           MDM  Patient requesting medication refill. Currently asymptomatic. Vitals reassuring. REASSESSMENT        CONSULTS:  None    PROCEDURES:  Unless otherwise noted below, none     Procedures    FINAL IMPRESSION      1. Encounter for medication refill    2. Hypothyroidism, unspecified type          DISPOSITION/PLAN   DISPOSITION Decision To Discharge 04/21/2022 05:13:11 PM      PATIENT REFERRED TO:  Brianda Archuleta MD  [address]  [phone]            DISCHARGE MEDICATIONS:  Current Discharge Medication List        Controlled Substances Monitoring:     No flowsheet data found.     (Please note that portions of this note were completed with a voice recognition program.  Efforts were made to edit the dictations but occasionally words are mis-transcribed.)    Isabela Cooney MD (electronically signed)  Attending Emergency Physician           Isabela Cooney MD  04/21/22 5356

## 2022-12-19 ENCOUNTER — HOSPITAL ENCOUNTER (EMERGENCY)
Age: 47
Discharge: HOME | End: 2022-12-19
Payer: COMMERCIAL

## 2022-12-19 VITALS
WEIGHT: 145 LBS | DIASTOLIC BLOOD PRESSURE: 84 MMHG | RESPIRATION RATE: 16 BRPM | OXYGEN SATURATION: 99 % | TEMPERATURE: 98.4 F | BODY MASS INDEX: 25.69 KG/M2 | SYSTOLIC BLOOD PRESSURE: 126 MMHG | HEART RATE: 80 BPM | HEIGHT: 63 IN

## 2022-12-19 DIAGNOSIS — J06.9 UPPER RESPIRATORY TRACT INFECTION, UNSPECIFIED TYPE: ICD-10-CM

## 2022-12-19 DIAGNOSIS — N30.00 ACUTE CYSTITIS WITHOUT HEMATURIA: Primary | ICD-10-CM

## 2022-12-19 DIAGNOSIS — Z76.0 ENCOUNTER FOR MEDICATION REFILL: ICD-10-CM

## 2022-12-19 LAB
BACTERIA: ABNORMAL /HPF
BILIRUBIN URINE: NEGATIVE
BLOOD, URINE: ABNORMAL
CLARITY: ABNORMAL
COLOR: YELLOW
EPITHELIAL CELLS, UA: ABNORMAL /HPF (ref 0–5)
GLUCOSE URINE: NEGATIVE MG/DL
HCG, URINE, POC: NEGATIVE
HYALINE CASTS: ABNORMAL /HPF (ref 0–5)
INFLUENZA A BY PCR: NEGATIVE
INFLUENZA B BY PCR: NEGATIVE
KETONES, URINE: NEGATIVE MG/DL
LEUKOCYTE ESTERASE, URINE: ABNORMAL
Lab: NORMAL
NEGATIVE QC PASS/FAIL: NORMAL
NITRITE, URINE: NEGATIVE
PH UA: 6.5 (ref 5–9)
POSITIVE QC PASS/FAIL: NORMAL
PROTEIN UA: NEGATIVE MG/DL
RBC UA: ABNORMAL /HPF (ref 0–5)
SARS-COV-2, NAAT: NOT DETECTED
SPECIFIC GRAVITY UA: 1.02 (ref 1–1.03)
URINE REFLEX TO CULTURE: YES
UROBILINOGEN, URINE: 0.2 E.U./DL
WBC UA: ABNORMAL /HPF (ref 0–5)

## 2022-12-19 PROCEDURE — 87635 SARS-COV-2 COVID-19 AMP PRB: CPT

## 2022-12-19 PROCEDURE — 99283 EMERGENCY DEPT VISIT LOW MDM: CPT

## 2022-12-19 PROCEDURE — 87086 URINE CULTURE/COLONY COUNT: CPT

## 2022-12-19 PROCEDURE — 87186 SC STD MICRODIL/AGAR DIL: CPT

## 2022-12-19 PROCEDURE — 87502 INFLUENZA DNA AMP PROBE: CPT

## 2022-12-19 PROCEDURE — 81001 URINALYSIS AUTO W/SCOPE: CPT

## 2022-12-19 PROCEDURE — 87088 URINE BACTERIA CULTURE: CPT

## 2022-12-19 RX ORDER — LEVOTHYROXINE SODIUM 0.12 MG/1
250 TABLET ORAL DAILY
Qty: 120 TABLET | Refills: 0 | Status: SHIPPED | OUTPATIENT
Start: 2022-12-19 | End: 2023-02-17

## 2022-12-19 RX ORDER — ALBUTEROL SULFATE 90 UG/1
2 AEROSOL, METERED RESPIRATORY (INHALATION) 4 TIMES DAILY PRN
Qty: 54 G | Refills: 1 | Status: SHIPPED | OUTPATIENT
Start: 2022-12-19

## 2022-12-19 RX ORDER — CEPHALEXIN 500 MG/1
1000 CAPSULE ORAL 2 TIMES DAILY
Qty: 28 CAPSULE | Refills: 0 | Status: SHIPPED | OUTPATIENT
Start: 2022-12-19 | End: 2022-12-26

## 2022-12-19 ASSESSMENT — ENCOUNTER SYMPTOMS
COLOR CHANGE: 0
DIARRHEA: 0
NAUSEA: 0
RHINORRHEA: 1
ABDOMINAL PAIN: 0
COUGH: 0
SHORTNESS OF BREATH: 1
SORE THROAT: 0
WHEEZING: 1
BLOOD IN STOOL: 0
VOMITING: 0

## 2022-12-19 ASSESSMENT — PAIN - FUNCTIONAL ASSESSMENT: PAIN_FUNCTIONAL_ASSESSMENT: NONE - DENIES PAIN

## 2022-12-19 NOTE — ED PROVIDER NOTES
[address] [facility] ED  eMERGENCY dEPARTMENT eNCOUnter      Pt Name: Dionna Taveras  MRN: [id number]  [place] 1975  Date of evaluation: 12/19/2022  Provider: DAGOBERTO Hebert      HISTORY OF PRESENT ILLNESS    Dionna Taveras is a 52 y.o. female with PMHx of HTN, hypothyroidism, hyperprolactinemia, pituitary adenoma presents to the emergency department with multiple complaints. Patient has been out of her Synthroid for 1 month. States she was kicked out of her family doctor's because of her \"mouth \". Today she started with runny nose, nasal congestion in the last 2 days shortness of breath with wheezing at home. Patient is a smoker. She is constipated, today had hard stools, did start taking laxatives at home. 2 days ago started with dysuria and urinary pressure. She denies fevers, cough, chest pain, nausea, vomiting, diarrhea. She does not have an inhaler at home. HPI    Nursing Notes were reviewed. REVIEW OF SYSTEMS       Review of Systems   Constitutional:  Negative for appetite change, chills and fever. HENT:  Positive for congestion and rhinorrhea. Negative for sore throat. Respiratory:  Positive for shortness of breath and wheezing. Negative for cough. Cardiovascular:  Negative for chest pain. Gastrointestinal:  Negative for abdominal pain, blood in stool, diarrhea, nausea and vomiting. Genitourinary:  Positive for dysuria. Negative for difficulty urinating. Musculoskeletal:  Negative for neck stiffness. Skin:  Negative for color change and rash. Neurological:  Negative for dizziness, syncope, weakness, light-headedness, numbness and headaches. All other systems reviewed and are negative.           PAST MEDICAL HISTORY     Past Medical History:   Diagnosis Date    Hypertension     Thyroid disease          SURGICAL HISTORY       Past Surgical History:   Procedure Laterality Date    CHOLECYSTECTOMY      TUBAL LIGATION           CURRENT MEDICATIONS       Previous Medications    ASPIRIN EC 81 MG EC TABLET    Take 81 mg by mouth daily    CABERGOLINE (DOSTINEX) 0.5 MG TABLET    Take 1 tablet by mouth Twice a Week    HYDROCHLOROTHIAZIDE (HYDRODIURIL) 25 MG TABLET    Take 1 tablet by mouth daily for 7 days    IBUPROFEN (ADVIL;MOTRIN) 800 MG TABLET    Take 1 tablet by mouth 2 times daily as needed for Pain    METOCLOPRAMIDE (REGLAN) 10 MG TABLET    Take 1 tablet by mouth 2 times daily as needed (Headache)    NAPROXEN (NAPROSYN) 500 MG TABLET    Take 1 tablet by mouth 2 times daily for 20 doses    ONDANSETRON (ZOFRAN ODT) 4 MG DISINTEGRATING TABLET    Take 1-2 tablets by mouth every 12 hours as needed for Nausea May Sub regular tablet (non-ODT) if insurance does not cover ODT. ROSUVASTATIN (CRESTOR) 40 MG TABLET    Take 1 tablet by mouth nightly       ALLERGIES     Patient has no known allergies. FAMILY HISTORY     History reviewed. No pertinent family history. SOCIAL HISTORY       Social History     Socioeconomic History    Marital status: Single     Spouse name: None    Number of children: None    Years of education: None    Highest education level: None   Tobacco Use    Smoking status: Every Day     Packs/day: 0.50     Years: 23.00     Pack years: 11.50     Types: Cigarettes    Smokeless tobacco: Never   Vaping Use    Vaping Use: Never used   Substance and Sexual Activity    Alcohol use: Yes     Comment: 3xwk    Drug use: Not Currently     Types: Cocaine         PHYSICAL EXAM         ED Triage Vitals [12/19/22 1655]   BP Temp Temp Source Heart Rate Resp SpO2 Height Weight   129/86 98.4 °F (36.9 °C) Oral 85 18 99 % 5' 3\" (1.6 m) 145 lb (65.8 kg)       Physical Exam  Constitutional:       Appearance: She is well-developed. HENT:      Head: Normocephalic and atraumatic. Right Ear: Tympanic membrane normal.      Left Ear: Tympanic membrane normal.      Mouth/Throat:      Pharynx: No oropharyngeal exudate or posterior oropharyngeal erythema.    Eyes: Conjunctiva/sclera: Conjunctivae normal.      Pupils: Pupils are equal, round, and reactive to light. Neck:      Trachea: No tracheal deviation. Cardiovascular:      Heart sounds: Normal heart sounds. Pulmonary:      Effort: Pulmonary effort is normal. No respiratory distress. Breath sounds: Normal breath sounds. No stridor. Abdominal:      General: Bowel sounds are normal. There is no distension. Palpations: Abdomen is soft. There is no mass. Tenderness: There is no abdominal tenderness. There is no guarding or rebound. Musculoskeletal:         General: Normal range of motion. Cervical back: Normal range of motion and neck supple. Skin:     General: Skin is warm and dry. Capillary Refill: Capillary refill takes less than 2 seconds. Findings: No rash. Neurological:      Mental Status: She is alert and oriented to person, place, and time. Deep Tendon Reflexes: Reflexes are normal and symmetric. Psychiatric:         Behavior: Behavior normal.         Thought Content:  Thought content normal.         Judgment: Judgment normal.       DIAGNOSTIC RESULTS     EKG:All EKG's are interpreted by the Emergency Department Physician who either signs or Co-signs this chart in the absence of a cardiologist.        RADIOLOGY:   Non-plain film images such as CT, Ultrasound and MRI are read by theradiologist. Plain radiographic images are visualized and preliminarily interpreted by the emergency physician with the below findings:    Interpretation per theRadiologist below, if available at the time of this note:    No orders to display           LABS:  [address] - Abnormal; Notable for the following components:       Result Value    Clarity, UA CLOUDY (*)     Blood, Urine MODERATE (*)     Leukocyte Esterase, Urine MODERATE (*)     All other components within normal limits   MICROSCOPIC URINALYSIS - Abnormal; Notable for the following components: Bacteria, UA MANY (*)     WBC, UA 20-50 (*)     RBC, UA 10-20 (*)     All other components within normal limits   COVID-19, RAPID   RAPID INFLUENZA A/B ANTIGENS   CULTURE, URINE   POC PREGNANCY UR-QUAL       All other labs were within normal range or not returned as of this dictation. EMERGENCY DEPARTMENT COURSE and DIFFERENTIAL DIAGNOSIS/MDM:   Vitals:    Vitals:    12/19/22 1655   BP: 129/86   Pulse: 85   Resp: 18   Temp: 98.4 °F (36.9 °C)   TempSrc: Oral   SpO2: 99%   Weight: 145 lb (65.8 kg)   Height: 5' 3\" (1.6 m)         MDM    Pt refusing cardiac workup including EKG. Wanting us to just place her on Abx for UTI and call her with COVID and flu results. Informed I will not be able to do this. She says somebody will call her with her results like previously or she will call to follow up on the results. She will be given list of new PCP to follow up with, with new Synthroid RX x 2 months. UTI present and given keflex PO and albuterol inhaler. No wheezing on exam, labored respirations, hypoxia. Standard anticipatory guidance given to patient upon discharge. Have given them a specific time frame in which to follow-up and who to follow-up with. I have also advised them that they should return to the emergency department if they get worse, or not getting better or develop any new or concerning symptoms. Patient demonstrates understanding. CRITICAL CARE TIME   Total Critical Caretime was 0 minutes, excluding separately reportable procedures. There was a high probability of clinically significant/life threatening deterioration in the patient's condition which required my urgent intervention. Procedures    FINAL IMPRESSION      1. Acute cystitis without hematuria    2. Encounter for medication refill    3.  Upper respiratory tract infection, unspecified type          DISPOSITION/PLAN   DISPOSITION Decision To Discharge 12/19/2022 05:41:00 PM      PATIENT REFERRED TO:  [facility] and [address] [phone]        DISCHARGE MEDICATIONS:  New Prescriptions    ALBUTEROL SULFATE HFA (VENTOLIN HFA) 108 (90 BASE) MCG/ACT INHALER    Inhale 2 puffs into the lungs 4 times daily as needed for Wheezing    CEPHALEXIN (KEFLEX) 500 MG CAPSULE    Take 2 capsules by mouth 2 times daily for 7 days          (Please notethat portions of this note were completed with a voice recognition program.  Efforts were made to edit the dictations but occasionally words are mis-transcribed. )    DAGOBERTO Segura (electronically signed)  Emergency Physician Assistant         Anibal Fonseca, [place]  12/19/22 1515

## 2022-12-21 LAB
ORGANISM: ABNORMAL
URINE CULTURE, ROUTINE: ABNORMAL
URINE CULTURE, ROUTINE: ABNORMAL

## 2023-05-12 ENCOUNTER — HOSPITAL ENCOUNTER (EMERGENCY)
Age: 48
Discharge: HOME | End: 2023-05-12
Payer: COMMERCIAL

## 2023-05-12 VITALS
HEART RATE: 80 BPM | DIASTOLIC BLOOD PRESSURE: 89 MMHG | BODY MASS INDEX: 26.58 KG/M2 | WEIGHT: 150 LBS | TEMPERATURE: 98.1 F | HEIGHT: 63 IN | RESPIRATION RATE: 18 BRPM | SYSTOLIC BLOOD PRESSURE: 129 MMHG | OXYGEN SATURATION: 97 %

## 2023-05-12 DIAGNOSIS — Z76.0 ENCOUNTER FOR MEDICATION REFILL: Primary | ICD-10-CM

## 2023-05-12 PROCEDURE — 99283 EMERGENCY DEPT VISIT LOW MDM: CPT

## 2023-05-12 RX ORDER — LEVOTHYROXINE SODIUM 0.12 MG/1
250 TABLET ORAL DAILY
Qty: 60 TABLET | Refills: 1 | Status: SHIPPED | OUTPATIENT
Start: 2023-05-12 | End: 2023-07-11

## 2023-05-12 ASSESSMENT — ENCOUNTER SYMPTOMS
TROUBLE SWALLOWING: 0
ABDOMINAL PAIN: 0
VOMITING: 0
BACK PAIN: 0
COUGH: 0
NAUSEA: 0
SORE THROAT: 0
DIARRHEA: 0
SHORTNESS OF BREATH: 0

## 2023-05-12 ASSESSMENT — PAIN - FUNCTIONAL ASSESSMENT
PAIN_FUNCTIONAL_ASSESSMENT: NONE - DENIES PAIN
PAIN_FUNCTIONAL_ASSESSMENT: NONE - DENIES PAIN

## 2023-05-12 ASSESSMENT — LIFESTYLE VARIABLES
HOW MANY STANDARD DRINKS CONTAINING ALCOHOL DO YOU HAVE ON A TYPICAL DAY: 3 OR 4
HOW OFTEN DO YOU HAVE A DRINK CONTAINING ALCOHOL: 2-4 TIMES A MONTH

## 2023-05-12 NOTE — CASE COMMUNICATION
I spoke with the patient about getting a pcp and provided her with a list of providers. She declined my offer to make her an appt with one but did state that she would call herself and get an appt. D/c instructions were received from Gabino Russell NP and I was instructed to review them with the patient which I did and she stated understanding. She had no iv line. She signed the d/c papers and ambulated steadily out of the ER.

## 2023-05-12 NOTE — ED PROVIDER NOTES
[facility] ED  eMERGENCY dEPARTMENT eNCOUnter      Pt Name: Haleigh Lemus  MRN: [id number]  Katlyngfsaleem 1975  Date of evaluation: 5/12/2023  Provider: PELON Camacho CNP      HISTORY OF PRESENT ILLNESS    Haleigh Lemus is a 50 y.o. female who presents to the Emergency Department with request for Medication refill. Patient takes Synthroid 250 mcg daily that she has been out of for 1 month. Vitals are stable she stats she just feels fatigue and needs her medication. She does not have a PCP at this time. REVIEW OF SYSTEMS       Review of Systems   Constitutional:  Positive for fatigue. Negative for activity change, appetite change and fever. HENT:  Negative for congestion, sore throat and trouble swallowing. Respiratory:  Negative for cough and shortness of breath. Cardiovascular:  Negative for chest pain. Gastrointestinal:  Negative for abdominal pain, diarrhea, nausea and vomiting. Genitourinary:  Negative for dysuria. Musculoskeletal:  Negative for arthralgias, back pain and neck pain. Skin:  Negative for rash. Neurological:  Negative for dizziness, seizures, syncope, light-headedness and headaches. All other systems reviewed and are negative.       PAST MEDICAL HISTORY     Past Medical History:   Diagnosis Date    Hypertension     Thyroid disease          SURGICAL HISTORY       Past Surgical History:   Procedure Laterality Date    CHOLECYSTECTOMY      TUBAL LIGATION           CURRENT MEDICATIONS       Current Discharge Medication List        CONTINUE these medications which have NOT CHANGED    Details   albuterol sulfate HFA (VENTOLIN HFA) 108 (90 Base) MCG/ACT inhaler Inhale 2 puffs into the lungs 4 times daily as needed for Wheezing  Qty: 54 g, Refills: 1      metoclopramide (REGLAN) 10 MG tablet Take 1 tablet by mouth 2 times daily as needed (Headache)  Qty: 60 tablet, Refills: 0      ibuprofen (ADVIL;MOTRIN) 800 MG tablet Take 1 tablet by mouth 2 times daily

## 2023-06-29 ENCOUNTER — HOSPITAL ENCOUNTER (EMERGENCY)
Age: 48
Discharge: HOME | End: 2023-06-29
Payer: COMMERCIAL

## 2023-06-29 VITALS
RESPIRATION RATE: 18 BRPM | HEART RATE: 98 BPM | WEIGHT: 140 LBS | BODY MASS INDEX: 24.8 KG/M2 | OXYGEN SATURATION: 100 % | HEIGHT: 63 IN | DIASTOLIC BLOOD PRESSURE: 87 MMHG | TEMPERATURE: 98.1 F | SYSTOLIC BLOOD PRESSURE: 130 MMHG

## 2023-06-29 DIAGNOSIS — E03.9 HYPOTHYROIDISM, UNSPECIFIED TYPE: ICD-10-CM

## 2023-06-29 DIAGNOSIS — N76.0 VAGINITIS AND VULVOVAGINITIS: Primary | ICD-10-CM

## 2023-06-29 DIAGNOSIS — K59.00 CONSTIPATION, UNSPECIFIED CONSTIPATION TYPE: ICD-10-CM

## 2023-06-29 PROCEDURE — 87252 VIRUS INOCULATION TISSUE: CPT

## 2023-06-29 PROCEDURE — 87253 VIRUS INOCULATE TISSUE ADDL: CPT

## 2023-06-29 PROCEDURE — 99283 EMERGENCY DEPT VISIT LOW MDM: CPT

## 2023-06-29 RX ORDER — LEVOTHYROXINE SODIUM 0.12 MG/1
250 TABLET ORAL DAILY
Qty: 60 TABLET | Refills: 1 | Status: SHIPPED | OUTPATIENT
Start: 2023-06-29 | End: 2023-08-28

## 2023-06-29 RX ORDER — DOCUSATE SODIUM 100 MG/1
100 CAPSULE, LIQUID FILLED ORAL 2 TIMES DAILY
Qty: 60 CAPSULE | Refills: 0 | Status: SHIPPED | OUTPATIENT
Start: 2023-06-29

## 2023-06-29 ASSESSMENT — PAIN DESCRIPTION - DESCRIPTORS: DESCRIPTORS: SHARP;STABBING

## 2023-06-29 ASSESSMENT — PAIN DESCRIPTION - PAIN TYPE: TYPE: ACUTE PAIN

## 2023-06-29 ASSESSMENT — PAIN SCALES - GENERAL: PAINLEVEL_OUTOF10: 8

## 2023-06-29 ASSESSMENT — ENCOUNTER SYMPTOMS: ABDOMINAL PAIN: 0

## 2023-06-29 ASSESSMENT — LIFESTYLE VARIABLES
HOW OFTEN DO YOU HAVE A DRINK CONTAINING ALCOHOL: 2-3 TIMES A WEEK
HOW MANY STANDARD DRINKS CONTAINING ALCOHOL DO YOU HAVE ON A TYPICAL DAY: 1 OR 2

## 2023-06-29 ASSESSMENT — PAIN DESCRIPTION - LOCATION: LOCATION: VAGINA

## 2023-06-29 ASSESSMENT — PAIN DESCRIPTION - FREQUENCY: FREQUENCY: CONTINUOUS

## 2023-06-29 ASSESSMENT — PAIN - FUNCTIONAL ASSESSMENT: PAIN_FUNCTIONAL_ASSESSMENT: 0-10

## 2023-07-01 LAB
FINAL REPORT: NORMAL
PRELIMINARY: NORMAL

## 2023-12-06 ENCOUNTER — HOSPITAL ENCOUNTER (EMERGENCY)
Age: 48
Discharge: HOME | End: 2023-12-06
Payer: COMMERCIAL

## 2023-12-06 VITALS
WEIGHT: 148 LBS | OXYGEN SATURATION: 100 % | HEART RATE: 81 BPM | SYSTOLIC BLOOD PRESSURE: 135 MMHG | BODY MASS INDEX: 26.22 KG/M2 | HEIGHT: 63 IN | TEMPERATURE: 98.4 F | DIASTOLIC BLOOD PRESSURE: 89 MMHG | RESPIRATION RATE: 18 BRPM

## 2023-12-06 DIAGNOSIS — Z76.0 ENCOUNTER FOR MEDICATION REFILL: ICD-10-CM

## 2023-12-06 DIAGNOSIS — B34.9 VIRAL ILLNESS: Primary | ICD-10-CM

## 2023-12-06 PROCEDURE — 99283 EMERGENCY DEPT VISIT LOW MDM: CPT

## 2023-12-06 RX ORDER — LEVOTHYROXINE SODIUM 0.12 MG/1
125 TABLET ORAL DAILY
Qty: 30 TABLET | Refills: 0 | Status: SHIPPED | OUTPATIENT
Start: 2023-12-06 | End: 2024-01-05

## 2023-12-06 ASSESSMENT — ENCOUNTER SYMPTOMS
VOMITING: 0
VOICE CHANGE: 1
ABDOMINAL PAIN: 0
SORE THROAT: 0
DIARRHEA: 0
RHINORRHEA: 1
COUGH: 1
WHEEZING: 0
NAUSEA: 0
SHORTNESS OF BREATH: 0

## 2023-12-06 ASSESSMENT — PAIN DESCRIPTION - DESCRIPTORS: DESCRIPTORS: SHOOTING

## 2023-12-06 ASSESSMENT — PAIN SCALES - GENERAL: PAINLEVEL_OUTOF10: 6

## 2023-12-06 ASSESSMENT — PAIN - FUNCTIONAL ASSESSMENT: PAIN_FUNCTIONAL_ASSESSMENT: 0-10

## 2023-12-06 NOTE — ED PROVIDER NOTES
[facility] Discussed with patient that her ear fullness is likely related to a virus and offered the patient viral testing today in the emergency department and she declined. Discussed that she can treat viruses at home with plenty of rest and fluids and Tylenol and Motrin as needed for any pain or fevers. Discussed that Motrin will help decrease the inflammation in her ear and improve her symptoms. Patient requested to have fluid drained out of her right ear and I discussed with patient that we are unable to do that in the emergency department but I will refer her to an ENT specialist to follow-up for further evaluation and management. I also provided the patient a refill of her Synthroid and instructed her to follow-up with her primary care at the soonest availability for continued management of  hypothyroidism. Patient will schedule an appoint with primary care and schedule an appointment to follow-up with  ENT specialist.  Patient will return to the emergency department if condition worsens. Patient expresses understanding and agreement with this plan. Medical Decision Making  Risk  Prescription drug management. Coding     PROCEDURES:    Procedures      FINAL IMPRESSION      1. Viral illness    2. Encounter for medication refill          DISPOSITION/PLAN   DISPOSITION Decision To Discharge 12/06/2023 05:33:38 PM      PATIENT REFERRED TO:  Ovi Sharan, [address] [facility] [address] [phone]    Call in 2 days  For follow up appointment. [facility] and [address] [place] [address]  [phone]  Call in 2 days  For follow up appointment. Ben Pretty PA-C  [address] [place] [address]  [phone]    Call in 2 days  For follow up appointment.     Maikol Du MD  [address] Rankin [address] [facility] [phone]    Call in 2 days  For

## 2023-12-06 NOTE — ED TRIAGE NOTES
Patient here for left side arm pain and numbness, as well as right ear fullness. Numbness began today. Patient reports she has been off her thyroid medication for 1 month, pt concerned numbness may be related. Patient VSS. Patient A&Ox4.

## 2024-02-17 ENCOUNTER — HOSPITAL ENCOUNTER (INPATIENT)
Age: 49
LOS: 3 days | Discharge: HOME HEALTH | DRG: 069 | End: 2024-02-20
Attending: STUDENT IN AN ORGANIZED HEALTH CARE EDUCATION/TRAINING PROGRAM | Admitting: INTERNAL MEDICINE
Payer: COMMERCIAL

## 2024-02-17 ENCOUNTER — APPOINTMENT (OUTPATIENT)
Dept: GENERAL RADIOLOGY | Age: 49
DRG: 069 | End: 2024-02-17
Payer: COMMERCIAL

## 2024-02-17 ENCOUNTER — APPOINTMENT (OUTPATIENT)
Dept: CT IMAGING | Age: 49
DRG: 069 | End: 2024-02-17
Payer: COMMERCIAL

## 2024-02-17 DIAGNOSIS — R26.0 ATAXIC GAIT: ICD-10-CM

## 2024-02-17 DIAGNOSIS — E03.9 HYPOTHYROIDISM, UNSPECIFIED TYPE: ICD-10-CM

## 2024-02-17 DIAGNOSIS — F19.10 POLYSUBSTANCE ABUSE (HCC): ICD-10-CM

## 2024-02-17 DIAGNOSIS — F51.01 PRIMARY INSOMNIA: ICD-10-CM

## 2024-02-17 DIAGNOSIS — R53.1 LEFT-SIDED WEAKNESS: Primary | ICD-10-CM

## 2024-02-17 PROBLEM — R29.90 STROKE-LIKE SYMPTOMS: Status: ACTIVE | Noted: 2024-02-17

## 2024-02-17 LAB
ALBUMIN SERPL-MCNC: 3.9 G/DL (ref 3.5–4.6)
ALP SERPL-CCNC: 78 U/L (ref 40–130)
ALT SERPL-CCNC: 29 U/L (ref 0–33)
AMPHET UR QL SCN: ABNORMAL
ANION GAP SERPL CALCULATED.3IONS-SCNC: 10 MEQ/L (ref 9–15)
AST SERPL-CCNC: 28 U/L (ref 0–35)
BACTERIA URNS QL MICRO: ABNORMAL /HPF
BARBITURATES UR QL SCN: ABNORMAL
BASOPHILS # BLD: 0.1 K/UL (ref 0–0.2)
BASOPHILS NFR BLD: 0.8 %
BENZODIAZ UR QL SCN: ABNORMAL
BILIRUB SERPL-MCNC: <0.2 MG/DL (ref 0.2–0.7)
BILIRUB UR QL STRIP: NEGATIVE
BUN SERPL-MCNC: 8 MG/DL (ref 6–20)
CALCIUM SERPL-MCNC: 8.4 MG/DL (ref 8.5–9.9)
CANNABINOIDS UR QL SCN: ABNORMAL
CHLORIDE SERPL-SCNC: 103 MEQ/L (ref 95–107)
CLARITY UR: ABNORMAL
CO2 SERPL-SCNC: 30 MEQ/L (ref 20–31)
COCAINE UR QL SCN: POSITIVE
COLOR UR: YELLOW
CREAT SERPL-MCNC: 0.81 MG/DL (ref 0.5–0.9)
DRUG SCREEN COMMENT UR-IMP: ABNORMAL
EOSINOPHIL # BLD: 0.3 K/UL (ref 0–0.7)
EOSINOPHIL NFR BLD: 3.1 %
EPI CELLS #/AREA URNS AUTO: ABNORMAL /HPF (ref 0–5)
ERYTHROCYTE [DISTWIDTH] IN BLOOD BY AUTOMATED COUNT: 13.2 % (ref 11.5–14.5)
FENTANYL SCREEN, URINE: ABNORMAL
GLOBULIN SER CALC-MCNC: 3.5 G/DL (ref 2.3–3.5)
GLUCOSE SERPL-MCNC: 86 MG/DL (ref 70–99)
GLUCOSE UR STRIP-MCNC: NEGATIVE MG/DL
HCT VFR BLD AUTO: 37.5 % (ref 37–47)
HGB BLD-MCNC: 12 G/DL (ref 12–16)
HGB UR QL STRIP: ABNORMAL
HYALINE CASTS #/AREA URNS AUTO: ABNORMAL /HPF (ref 0–5)
KETONES UR STRIP-MCNC: NEGATIVE MG/DL
LEUKOCYTE ESTERASE UR QL STRIP: ABNORMAL
LYMPHOCYTES # BLD: 2.9 K/UL (ref 1–4.8)
LYMPHOCYTES NFR BLD: 30.4 %
MAGNESIUM SERPL-MCNC: 2 MG/DL (ref 1.7–2.4)
MCH RBC QN AUTO: 28.3 PG (ref 27–31.3)
MCHC RBC AUTO-ENTMCNC: 32 % (ref 33–37)
MCV RBC AUTO: 88.4 FL (ref 79.4–94.8)
METHADONE UR QL SCN: ABNORMAL
MONOCYTES # BLD: 0.6 K/UL (ref 0.2–0.8)
MONOCYTES NFR BLD: 6.7 %
NEUTROPHILS # BLD: 5.6 K/UL (ref 1.4–6.5)
NEUTS SEG NFR BLD: 58.7 %
NITRITE UR QL STRIP: POSITIVE
OPIATES UR QL SCN: ABNORMAL
OXYCODONE UR QL SCN: ABNORMAL
PCP UR QL SCN: ABNORMAL
PH UR STRIP: 6 [PH] (ref 5–9)
PLATELET # BLD AUTO: 394 K/UL (ref 130–400)
POTASSIUM SERPL-SCNC: 3.8 MEQ/L (ref 3.4–4.9)
PROPOXYPH UR QL SCN: ABNORMAL
PROT SERPL-MCNC: 7.4 G/DL (ref 6.3–8)
PROT UR STRIP-MCNC: ABNORMAL MG/DL
RBC # BLD AUTO: 4.24 M/UL (ref 4.2–5.4)
RBC #/AREA URNS AUTO: ABNORMAL /HPF (ref 0–5)
SODIUM SERPL-SCNC: 143 MEQ/L (ref 135–144)
SP GR UR STRIP: 1.02 (ref 1–1.03)
T4 FREE SERPL-MCNC: 0.11 NG/DL (ref 0.84–1.68)
TSH REFLEX: 621.7 UIU/ML (ref 0.44–3.86)
URINE REFLEX TO CULTURE: YES
UROBILINOGEN UR STRIP-ACNC: 0.2 E.U./DL
WBC # BLD AUTO: 9.6 K/UL (ref 4.8–10.8)
WBC #/AREA URNS AUTO: ABNORMAL /HPF (ref 0–5)

## 2024-02-17 PROCEDURE — 84443 ASSAY THYROID STIM HORMONE: CPT

## 2024-02-17 PROCEDURE — 70496 CT ANGIOGRAPHY HEAD: CPT

## 2024-02-17 PROCEDURE — 6360000002 HC RX W HCPCS: Performed by: STUDENT IN AN ORGANIZED HEALTH CARE EDUCATION/TRAINING PROGRAM

## 2024-02-17 PROCEDURE — 81001 URINALYSIS AUTO W/SCOPE: CPT

## 2024-02-17 PROCEDURE — 96365 THER/PROPH/DIAG IV INF INIT: CPT

## 2024-02-17 PROCEDURE — 96361 HYDRATE IV INFUSION ADD-ON: CPT

## 2024-02-17 PROCEDURE — 99285 EMERGENCY DEPT VISIT HI MDM: CPT

## 2024-02-17 PROCEDURE — 70498 CT ANGIOGRAPHY NECK: CPT

## 2024-02-17 PROCEDURE — 6360000004 HC RX CONTRAST MEDICATION: Performed by: STUDENT IN AN ORGANIZED HEALTH CARE EDUCATION/TRAINING PROGRAM

## 2024-02-17 PROCEDURE — 83735 ASSAY OF MAGNESIUM: CPT

## 2024-02-17 PROCEDURE — 71045 X-RAY EXAM CHEST 1 VIEW: CPT

## 2024-02-17 PROCEDURE — 80307 DRUG TEST PRSMV CHEM ANLYZR: CPT

## 2024-02-17 PROCEDURE — 2580000003 HC RX 258: Performed by: STUDENT IN AN ORGANIZED HEALTH CARE EDUCATION/TRAINING PROGRAM

## 2024-02-17 PROCEDURE — 85025 COMPLETE CBC W/AUTO DIFF WBC: CPT

## 2024-02-17 PROCEDURE — 1210000000 HC MED SURG R&B

## 2024-02-17 PROCEDURE — 93005 ELECTROCARDIOGRAM TRACING: CPT | Performed by: STUDENT IN AN ORGANIZED HEALTH CARE EDUCATION/TRAINING PROGRAM

## 2024-02-17 PROCEDURE — 36415 COLL VENOUS BLD VENIPUNCTURE: CPT

## 2024-02-17 PROCEDURE — 6370000000 HC RX 637 (ALT 250 FOR IP): Performed by: STUDENT IN AN ORGANIZED HEALTH CARE EDUCATION/TRAINING PROGRAM

## 2024-02-17 PROCEDURE — 82533 TOTAL CORTISOL: CPT

## 2024-02-17 PROCEDURE — 80053 COMPREHEN METABOLIC PANEL: CPT

## 2024-02-17 PROCEDURE — 84439 ASSAY OF FREE THYROXINE: CPT

## 2024-02-17 RX ORDER — MECLIZINE HYDROCHLORIDE 25 MG/1
25 TABLET ORAL ONCE
Status: COMPLETED | OUTPATIENT
Start: 2024-02-17 | End: 2024-02-17

## 2024-02-17 RX ORDER — 0.9 % SODIUM CHLORIDE 0.9 %
1000 INTRAVENOUS SOLUTION INTRAVENOUS ONCE
Status: COMPLETED | OUTPATIENT
Start: 2024-02-17 | End: 2024-02-17

## 2024-02-17 RX ADMIN — MECLIZINE HYDROCHLORIDE 25 MG: 25 TABLET ORAL at 20:08

## 2024-02-17 RX ADMIN — IOPAMIDOL 75 ML: 755 INJECTION, SOLUTION INTRAVENOUS at 21:08

## 2024-02-17 RX ADMIN — SODIUM CHLORIDE 1000 ML: 9 INJECTION, SOLUTION INTRAVENOUS at 20:07

## 2024-02-17 RX ADMIN — CEFTRIAXONE SODIUM 1000 MG: 1 INJECTION, POWDER, FOR SOLUTION INTRAMUSCULAR; INTRAVENOUS at 21:24

## 2024-02-17 NOTE — ED PROVIDER NOTES
refill takes less than 2 seconds.   Neurological:      Mental Status: She is alert and oriented to person, place, and time.      Cranial Nerves: No dysarthria or facial asymmetry.      Sensory: No sensory deficit.      Motor: Weakness present.      Coordination: Heel to Shin Test abnormal. Finger-Nose-Finger Test normal.      Gait: Gait abnormal.      Comments: 4/5 strength in the L-sided extremities.   Psychiatric:         Attention and Perception: She is inattentive.         Mood and Affect: Mood is anxious.         MDM:   Chart Reviewed: PMH and additional information as noted in HPI obtained from outside chart review.    Vitals:    Vitals:    02/17/24 2130 02/17/24 2200 02/17/24 2230 02/17/24 2300   BP: 117/85 120/89 (!) 119/95 (!) 122/98   Pulse: 71 69 63 71   Resp: 14 13 12 13   Temp:       TempSrc:       SpO2: 99% 98% 99% 100%   Weight:           PROCEDURES:  Unless otherwise noted below, none  Procedures    LABS:  Labs Reviewed   COMPREHENSIVE METABOLIC PANEL - Abnormal; Notable for the following components:       Result Value    Calcium 8.4 (*)     All other components within normal limits   CBC WITH AUTO DIFFERENTIAL - Abnormal; Notable for the following components:    MCHC 32.0 (*)     All other components within normal limits   URINALYSIS WITH REFLEX TO CULTURE - Abnormal; Notable for the following components:    Clarity, UA CLOUDY (*)     Blood, Urine MODERATE (*)     Protein, UA TRACE (*)     Nitrite, Urine POSITIVE (*)     Leukocyte Esterase, Urine MODERATE (*)     All other components within normal limits   URINE DRUG SCREEN - Abnormal; Notable for the following components:    Cocaine Metabolite Screen, Urine POSITIVE (*)     All other components within normal limits   TSH WITH REFLEX - Abnormal; Notable for the following components:    .700 (*)     All other components within normal limits   MICROSCOPIC URINALYSIS - Abnormal; Notable for the following components:    Bacteria, UA MANY (*)      the ED. Full read pending at this time. Not a candidate for TNK or intra-arterial interventions given last known well greater than 2 days ago.  No evidence of hemorrhagic stroke on my read of CT in the ED.  Will treat UTI in the ED given urinary symptoms.  ceftriaxone should cover mild cellulitis involving umbilicus.  Will admit to medicine for further evaluation and management..   Pt was administered   Medications   sodium chloride 0.9 % bolus 1,000 mL (0 mLs IntraVENous Stopped 2/17/24 2259)   meclizine (ANTIVERT) tablet 25 mg (25 mg Oral Given 2/17/24 2008)   iopamidol (ISOVUE-370) 76 % injection 75 mL (75 mLs IntraVENous Given 2/17/24 2108)   cefTRIAXone (ROCEPHIN) 1,000 mg in sodium chloride 0.9 % 50 mL IVPB (mini-bag) (0 mg IntraVENous Stopped 2/17/24 2201)       Plan: Admit to IM for further evaluation and management. Report given to Dr. Stoner. Patient understanding and amenable to the POC.    CRITICAL CARE TIME   Total CriticalCare time was 0 minutes, excluding separately reportable procedures.  There was a high probability of clinically significant/life threatening deterioration in the patient's condition which required my urgent intervention.        FINAL IMPRESSION      1. Left-sided weakness    2. Ataxic gait    3. Hypothyroidism, unspecified type    4. Polysubstance abuse (HCC)          DISPOSITION/PLAN   DISPOSITION Decision To Admit 02/17/2024 10:37:29 PM      Current Discharge Medication List           MD Anthony Lau Najee E, MD  02/17/24 5669

## 2024-02-18 ENCOUNTER — APPOINTMENT (OUTPATIENT)
Dept: CT IMAGING | Age: 49
DRG: 069 | End: 2024-02-18
Payer: COMMERCIAL

## 2024-02-18 LAB
ALBUMIN SERPL-MCNC: 3.5 G/DL (ref 3.5–4.6)
ALP SERPL-CCNC: 66 U/L (ref 40–130)
ALT SERPL-CCNC: 32 U/L (ref 0–33)
ANION GAP SERPL CALCULATED.3IONS-SCNC: 8 MEQ/L (ref 9–15)
AST SERPL-CCNC: 35 U/L (ref 0–35)
BILIRUB SERPL-MCNC: <0.2 MG/DL (ref 0.2–0.7)
BUN SERPL-MCNC: 7 MG/DL (ref 6–20)
CALCIUM SERPL-MCNC: 8.2 MG/DL (ref 8.5–9.9)
CHLORIDE SERPL-SCNC: 104 MEQ/L (ref 95–107)
CHOLEST SERPL-MCNC: 198 MG/DL (ref 0–199)
CO2 SERPL-SCNC: 27 MEQ/L (ref 20–31)
CORTISOL COLLECTION INFO: NORMAL
CORTISOL: 7.4 UG/DL (ref 2.7–18.4)
CREAT SERPL-MCNC: 0.65 MG/DL (ref 0.5–0.9)
ERYTHROCYTE [DISTWIDTH] IN BLOOD BY AUTOMATED COUNT: 13.2 % (ref 11.5–14.5)
GLOBULIN SER CALC-MCNC: 2.8 G/DL (ref 2.3–3.5)
GLUCOSE BLD-MCNC: 85 MG/DL (ref 70–99)
GLUCOSE SERPL-MCNC: 115 MG/DL (ref 70–99)
HBA1C MFR BLD: 5.6 % (ref 4.8–5.9)
HCT VFR BLD AUTO: 36.2 % (ref 37–47)
HDLC SERPL-MCNC: 59 MG/DL (ref 40–59)
HGB BLD-MCNC: 11.9 G/DL (ref 12–16)
LDLC SERPL CALC-MCNC: 122 MG/DL (ref 0–129)
MCH RBC QN AUTO: 28.6 PG (ref 27–31.3)
MCHC RBC AUTO-ENTMCNC: 32.9 % (ref 33–37)
MCV RBC AUTO: 87 FL (ref 79.4–94.8)
PERFORMED ON: NORMAL
PLATELET # BLD AUTO: 378 K/UL (ref 130–400)
POTASSIUM SERPL-SCNC: 3.6 MEQ/L (ref 3.4–4.9)
PROLACTIN SERPL-MCNC: 43.7 NG/ML
PROT SERPL-MCNC: 6.3 G/DL (ref 6.3–8)
RBC # BLD AUTO: 4.16 M/UL (ref 4.2–5.4)
SODIUM SERPL-SCNC: 139 MEQ/L (ref 135–144)
T4 FREE SERPL-MCNC: 0.3 NG/DL (ref 0.84–1.68)
TRIGL SERPL-MCNC: 86 MG/DL (ref 0–150)
TSH SERPL-MCNC: 558.8 UIU/ML (ref 0.44–3.86)
WBC # BLD AUTO: 9.2 K/UL (ref 4.8–10.8)

## 2024-02-18 PROCEDURE — 99223 1ST HOSP IP/OBS HIGH 75: CPT | Performed by: PSYCHIATRY & NEUROLOGY

## 2024-02-18 PROCEDURE — 6360000002 HC RX W HCPCS: Performed by: INTERNAL MEDICINE

## 2024-02-18 PROCEDURE — 84439 ASSAY OF FREE THYROXINE: CPT

## 2024-02-18 PROCEDURE — 85027 COMPLETE CBC AUTOMATED: CPT

## 2024-02-18 PROCEDURE — 83036 HEMOGLOBIN GLYCOSYLATED A1C: CPT

## 2024-02-18 PROCEDURE — 36415 COLL VENOUS BLD VENIPUNCTURE: CPT

## 2024-02-18 PROCEDURE — 97161 PT EVAL LOW COMPLEX 20 MIN: CPT

## 2024-02-18 PROCEDURE — 2500000003 HC RX 250 WO HCPCS: Performed by: INTERNAL MEDICINE

## 2024-02-18 PROCEDURE — 80053 COMPREHEN METABOLIC PANEL: CPT

## 2024-02-18 PROCEDURE — 84146 ASSAY OF PROLACTIN: CPT

## 2024-02-18 PROCEDURE — 80061 LIPID PANEL: CPT

## 2024-02-18 PROCEDURE — 1210000000 HC MED SURG R&B

## 2024-02-18 PROCEDURE — 2580000003 HC RX 258: Performed by: INTERNAL MEDICINE

## 2024-02-18 PROCEDURE — 6370000000 HC RX 637 (ALT 250 FOR IP): Performed by: INTERNAL MEDICINE

## 2024-02-18 PROCEDURE — 84443 ASSAY THYROID STIM HORMONE: CPT

## 2024-02-18 PROCEDURE — 74150 CT ABDOMEN W/O CONTRAST: CPT

## 2024-02-18 PROCEDURE — 97166 OT EVAL MOD COMPLEX 45 MIN: CPT

## 2024-02-18 RX ORDER — SODIUM CHLORIDE 0.9 % (FLUSH) 0.9 %
5-40 SYRINGE (ML) INJECTION PRN
Status: DISCONTINUED | OUTPATIENT
Start: 2024-02-18 | End: 2024-02-20 | Stop reason: HOSPADM

## 2024-02-18 RX ORDER — POLYETHYLENE GLYCOL 3350 17 G/17G
17 POWDER, FOR SOLUTION ORAL DAILY PRN
Status: DISCONTINUED | OUTPATIENT
Start: 2024-02-18 | End: 2024-02-20 | Stop reason: HOSPADM

## 2024-02-18 RX ORDER — ENOXAPARIN SODIUM 100 MG/ML
40 INJECTION SUBCUTANEOUS DAILY
Status: DISCONTINUED | OUTPATIENT
Start: 2024-02-18 | End: 2024-02-20 | Stop reason: HOSPADM

## 2024-02-18 RX ORDER — ONDANSETRON 2 MG/ML
4 INJECTION INTRAMUSCULAR; INTRAVENOUS EVERY 6 HOURS PRN
Status: DISCONTINUED | OUTPATIENT
Start: 2024-02-18 | End: 2024-02-20 | Stop reason: HOSPADM

## 2024-02-18 RX ORDER — SODIUM CHLORIDE 9 MG/ML
INJECTION, SOLUTION INTRAVENOUS PRN
Status: DISCONTINUED | OUTPATIENT
Start: 2024-02-18 | End: 2024-02-20 | Stop reason: HOSPADM

## 2024-02-18 RX ORDER — ASPIRIN 81 MG/1
81 TABLET, CHEWABLE ORAL DAILY
Status: DISCONTINUED | OUTPATIENT
Start: 2024-02-18 | End: 2024-02-20 | Stop reason: HOSPADM

## 2024-02-18 RX ORDER — ONDANSETRON 4 MG/1
4 TABLET, ORALLY DISINTEGRATING ORAL EVERY 8 HOURS PRN
Status: DISCONTINUED | OUTPATIENT
Start: 2024-02-18 | End: 2024-02-20 | Stop reason: HOSPADM

## 2024-02-18 RX ORDER — SODIUM CHLORIDE 0.9 % (FLUSH) 0.9 %
5-40 SYRINGE (ML) INJECTION EVERY 12 HOURS SCHEDULED
Status: DISCONTINUED | OUTPATIENT
Start: 2024-02-18 | End: 2024-02-20 | Stop reason: HOSPADM

## 2024-02-18 RX ORDER — ATORVASTATIN CALCIUM 40 MG/1
40 TABLET, FILM COATED ORAL NIGHTLY
Status: DISCONTINUED | OUTPATIENT
Start: 2024-02-18 | End: 2024-02-20 | Stop reason: HOSPADM

## 2024-02-18 RX ORDER — ASPIRIN 300 MG/1
300 SUPPOSITORY RECTAL DAILY
Status: DISCONTINUED | OUTPATIENT
Start: 2024-02-18 | End: 2024-02-20 | Stop reason: HOSPADM

## 2024-02-18 RX ORDER — LEVOTHYROXINE SODIUM 20 UG/ML
100 INJECTION, SOLUTION INTRAVENOUS ONCE
Status: COMPLETED | OUTPATIENT
Start: 2024-02-18 | End: 2024-02-18

## 2024-02-18 RX ADMIN — ENOXAPARIN SODIUM 40 MG: 100 INJECTION SUBCUTANEOUS at 10:07

## 2024-02-18 RX ADMIN — ATORVASTATIN CALCIUM 40 MG: 40 TABLET, FILM COATED ORAL at 01:44

## 2024-02-18 RX ADMIN — ASPIRIN 81 MG 81 MG: 81 TABLET ORAL at 10:07

## 2024-02-18 RX ADMIN — LEVOTHYROXINE SODIUM 125 MCG: 0.03 TABLET ORAL at 05:12

## 2024-02-18 RX ADMIN — SODIUM CHLORIDE, PRESERVATIVE FREE 10 ML: 5 INJECTION INTRAVENOUS at 20:21

## 2024-02-18 RX ADMIN — ATORVASTATIN CALCIUM 40 MG: 40 TABLET, FILM COATED ORAL at 20:20

## 2024-02-18 RX ADMIN — SODIUM CHLORIDE, PRESERVATIVE FREE 10 ML: 5 INJECTION INTRAVENOUS at 10:12

## 2024-02-18 RX ADMIN — LEVOTHYROXINE SODIUM 100 MCG: 20 INJECTION, SOLUTION INTRAVENOUS at 05:12

## 2024-02-18 ASSESSMENT — PAIN SCALES - GENERAL: PAINLEVEL_OUTOF10: 1

## 2024-02-18 ASSESSMENT — PAIN DESCRIPTION - LOCATION: LOCATION: ABDOMEN

## 2024-02-18 NOTE — CARE COORDINATION
Case Management Assessment  Initial Evaluation    Date/Time of Evaluation: 2/18/2024 10:47 AM  Assessment Completed by: Devi Morris RN    If patient is discharged prior to next notation, then this note serves as note for discharge by case management.    Patient Name: Justa Garay                   YOB: 1975  Diagnosis: Ataxic gait [R26.0]  Left-sided weakness [R53.1]  Polysubstance abuse (HCC) [F19.10]  Stroke-like symptoms [R29.90]  Hypothyroidism, unspecified type [E03.9]                   Date / Time: 2/17/2024  6:39 PM    Patient Admission Status: Inpatient   Readmission Risk (Low < 19, Mod (19-27), High > 27): Readmission Risk Score: 8.1    Current PCP: Glendy Reyna, DO  PCP verified by CM? No ([facility] PCP LIST GIVEN)    Chart Reviewed: Yes      History Provided by: Patient  Patient Orientation: Alert and Oriented, Person, Place, Situation, Self    Patient Cognition: Alert    Hospitalization in the last 30 days (Readmission):  No    If yes, Readmission Assessment in  Navigator will be completed.    Advance Directives:      Code Status: Full Code   Patient's Primary Decision Maker is: Legal Next of Kin      Discharge Planning:    Patient lives with: Alone Type of Home: Apartment  Primary Care Giver: Self  Patient Support Systems include: Family Members   Current Financial resources: Medicaid  Current community resources: None  Current services prior to admission: None            Current DME:              Type of Home Care services:  None    ADLS  Prior functional level: Independent in ADLs/IADLs  Current functional level: Independent in ADLs/IADLs    PT AM-PAC:   /24  OT AM-PAC:   /24    Family can provide assistance at DC: No  Would you like Case Management to discuss the discharge plan with any other family members/significant others, and if so, who? No  Plans to Return to Present Housing: Yes  Other Identified Issues/Barriers to RETURNING to current housing: NA  Potential  Department  Ph: [phone]

## 2024-02-18 NOTE — PROGRESS NOTES
Dr. Stoner notified of cortisol level result via [employer].      Regarding home med list, patient states that her synthroid is the only home medication she currently takes.

## 2024-02-18 NOTE — PROGRESS NOTES
Physical Therapy Med Surg Initial Assessment  Facility/Department: [address] ORTHO TELE  Room: [place] [address]       NAME: Justa Garay  : 1975 (49 y.o.)  MRN: [id number]  CODE STATUS: Full Code    Date of Service: 2024    Patient Diagnosis(es): Ataxic gait [R26.0]  Left-sided weakness [R53.1]  Polysubstance abuse (HCC) [F19.10]  Stroke-like symptoms [R29.90]  Hypothyroidism, unspecified type [E03.9]   Chief Complaint   Patient presents with    Extremity Weakness     Left arm; x2days    Left facial weakness and drooling     Patient Active Problem List    Diagnosis Date Noted    Stroke-like symptoms 2024    Acute low back pain 2021    Closed fracture of distal phalanx of little finger 2021    Dislocation of hand joint 2021    Nausea and vomiting 2021    Paresthesia     Ataxic gait     Pituitary adenoma (HCC)     Hypothyroidism     Hyperprolactinemia (HCC)     Stroke-like episode 2021    Encounter for issue of repeat prescription 2019    Long term (current) use of aspirin 2019    Other long term (current) drug therapy 2019        Past Medical History:   Diagnosis Date    Hypertension     Thyroid disease      Past Surgical History:   Procedure Laterality Date    CHOLECYSTECTOMY      TUBAL LIGATION         Patient assessed for rehabilitation services?: Yes  Family / Caregiver Present: No    Restrictions:  Restrictions/Precautions: Fall Risk     SUBJECTIVE:   Pain   6/10 abdomen- pt declined need for intervention    Prior Level of Function:  Social/Functional History  Lives With: Alone  Type of Home: House  Home Layout: One level, Laundry in basement (10 steps to basement)  Home Access: Stairs to enter with rails  Entrance Stairs - Number of Steps: 5  Entrance Stairs - Rails: Right  Bathroom Shower/Tub: Tub/Shower unit  Bathroom Equipment: Hand-held shower  Home Equipment: None  Has the patient had two or more falls in the past year or any fall with injury in

## 2024-02-18 NOTE — CONSULTS
Subjective:      Patient ID: Justa Garay is a 49 y.o. female who presents today for generalized weakness.    HPI 49 right-handed female admitted with weakness.  It was reported that she had some left arm and leg weakness with generalized numbness and tingling and paresthesias with difficulty walking.  Patient has a history of previous strokelike episodes hypertension hypothyroidism alcohol abuse.  Patient is not actually able to tell me what is wrong with her except she does not feel good.  Patient reported that she had multiple complaints including left-sided weakness difficulty ambulating and drooling abnormal sensations generalized fatigue headaches.  Patient is having difficulty concentrating recently and she felt numbness in the face.  Patient aware that she smoked bad crack cocaine and had bad ice we are not quite sure of this.  She reports intermittent drainage from bellybutton and she has a wound.  The weakness symptoms going on for 2 to 3 days.  Patient has a history of micro pituitary adenoma.  Urine tox screen positive for cocaine.  Patient significant abnormal thyroid dysfunction with hypothyroidism and I do not see thyroid medication listed on her home meds as they are     Review of Systems   Constitutional:  Negative for fever.   HENT:  Negative for ear pain, tinnitus and trouble swallowing.    Eyes:  Negative for photophobia and visual disturbance.   Respiratory:  Negative for choking and shortness of breath.    Cardiovascular:  Negative for chest pain and palpitations.   Gastrointestinal:  Negative for nausea and vomiting.   Musculoskeletal:  Negative for back pain, gait problem, joint swelling, myalgias, neck pain and neck stiffness.   Skin:  Negative for color change.   Allergic/Immunologic: Negative for food allergies.   Neurological:  Positive for weakness. Negative for dizziness, tremors, seizures, syncope, facial asymmetry, speech difficulty, light-headedness, numbness and headaches.  PHENCYCLIDINESCREENURINE Neg 02/17/2024 07:30 PM    ETOH <10 01/07/2022 04:15 PM     No results found for: \"LITHIUM\", \"DILFRTOT\", \"VALPROATE\"    Assessment:   Generalized weakness with Multiple symptoms going on for 2 to 3 days.  This most left-sided weakness and paresthesias which she had in the past as well.  Examination is essentially normal.  She is somewhat hyperreflexic throughout likely related to underlying hypothyroidism and patient has history of cocaine use.  Noncompliance medication likely.  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  Endocrine consultation recommended prolactin levels are pending.  CT angiograms reviewed and do not show large vessel disease.    Drew Ramirez MD, ALEJO  Diplomate, American Board of Psychiatry & Neurology  Board Certified in Vascular Neurology  Board Certified in Neuromuscular Medicine  Certified in Neurorehabilitation         Plan:

## 2024-02-18 NOTE — PROGRESS NOTES
[facility] OCCUPATIONAL THERAPY EVALUATION - ACUTE     NAME: Justa Garay  : 1975 (49 y.o.)  MRN: [id number]  CODE STATUS: Full Code  Room: [facility]/W283-01    Date of Service: 2024    Patient Diagnosis(es): Ataxic gait [R26.0]  Left-sided weakness [R53.1]  Polysubstance abuse (HCC) [F19.10]  Stroke-like symptoms [R29.90]  Hypothyroidism, unspecified type [E03.9]   Patient Active Problem List    Diagnosis Date Noted    Stroke-like symptoms 2024    Acute low back pain 2021    Closed fracture of distal phalanx of little finger 2021    Dislocation of hand joint 2021    Nausea and vomiting 2021    Paresthesia     Ataxic gait     Pituitary adenoma (HCC)     Hypothyroidism     Hyperprolactinemia (HCC)     Stroke-like episode 2021    Encounter for issue of repeat prescription 2019    Long term (current) use of aspirin 2019    Other long term (current) drug therapy 2019        Past Medical History:   Diagnosis Date    Hypertension     Thyroid disease      Past Surgical History:   Procedure Laterality Date    CHOLECYSTECTOMY      TUBAL LIGATION          Restrictions  Restrictions/Precautions: Fall Risk              Safety Devices: Safety Devices  Type of Devices: Call light within reach;Left in bed     Patient's date of birth confirmed: Yes    General:  Patient assessed for rehabilitation services?: Yes    Subjective:Pt pleasant and cooperative          Pain at start of treatment: No    Pain at end of treatment: No    Location: N/A  Description: N/A  Nursing notified: No  RN: N/A  Intervention: Repositioned    Prior Level of Function:  Social/Functional History  Lives With: Alone  Type of Home: House  Home Layout: One level, Laundry in basement (10 steps to basement)  Home Access: Stairs to enter with rails  Entrance Stairs - Number of Steps: 5  Entrance Stairs - Rails: Right  Bathroom Shower/Tub: Tub/Shower unit  Bathroom Equipment: Hand-held shower  Home  no assistance but may require a device   Stand by assistance = Pt. does not perform task at an independent level but does not need physical assistance, requires verbal cues  Minimal, Moderate, Maximal Assistance = Pt. requires physical assistance (25%, 50%, 75% assist from helper) for task but is able to actively participate in task   Dependent = Pt. requires total assistance with task and is not able to actively participate with task completion     Plan:  Occupational Therapy Plan  Times Per Week: 1-4x/wk  Current Treatment Recommendations: Strengthening, Balance training, Functional mobility training, Neuromuscular re-education, Self-Care / ADL    Goals:   Patient will:    - Improve functional endurance to tolerate/complete 20-30 mins of ADL's  - Be independent in UB ADLs   - Be independent in LB ADLs  - Be independent in ADL transfers without LOB  - Be independent in toileting tasks  - Improve bilateral UE Function (AROM, strength, motor control, tone normalization) to complete ADLs as projected.    Patient Goal: Patient goals : TO return to home     Discussed and agreed upon: Yes Comments:       Therapy Time:   Individual   Time In 1015   Time Out 1026   Minutes 11          Eval: 11 minutes     Electronically signed by:    BALBINA Rhoades,   2/18/2024, 11:28 AM Electronically signed by BALBINA Rhoades on 2/18/24 at 11:31 AM EST

## 2024-02-18 NOTE — H&P
DEPARTMENT OF HOSPITAL MEDICINE    HISTORY AND PHYSICAL EXAM    PATIENT NAME:  Justa Garay    MRN:  [id number]  SERVICE DATE:  2/17/2024   SERVICE TIME:  11:41 PM    Primary Care Physician: Glendy Reyna DO     SUBJECTIVE  CHIEF COMPLAINT: Left-sided arm/leg weakness, generalized numbness/paresthesias, difficulty walking, drug use (methamphetamine and cocaine)    HPI:  Justa Garay is a 49 y.o. female who presents with above complaints.    49-year-old female presenting with complaint of left-sided subjective arm and leg weakness for the past 2 days, as well as subjective ataxia and nonspecific numbness/tingling for up to the past 4 weeks.  History of polysubstance abuse, readily admits use of methamphetamine, but UDS in ED demonstrates positive cocaine, negative amphetamines.  On report, she thinks that some of the very inexpensive meth that she bought recently may have actually been \"wasp killer\" which she states that some people will spray through a screen and allowed to dry so it looks like meth and then sell it.  She is additionally noted to be severely hypothyroid.  Review of prior records demonstrates history of pituitary adenoma and hyperprolactinemia in addition to hypothyroidism for which she is supposed to be on levothyroxine replacement.  Hospitalist service consulted for admission.  Patient is a full code.    On examination, patient confirms subjective portions of the above.  She is open about her drug use patterns.  When asked, she states she has not taken her levothyroxine in at least a month.  She is having some symptoms of hypothyroidism, including subjective weakness, cold intolerance, fatigue, intermittent constipation.  It appears she may have been symptomatically treating some of the issues with recreational drugs with sympathomimetic effects (e.g. her recent methamphetamine and cocaine use).  Review of prior records demonstrates the patient had a somewhat similar presentation in 2021.

## 2024-02-18 NOTE — PROGRESS NOTES
Hospitalist Progress Note      Date of Admission: 2/17/2024  Chief Complaint:    Chief Complaint   Patient presents with    Extremity Weakness     Left arm; x2days    Left facial weakness and drooling     Subjective:  No new complaints.  No nausea, vomiting, chest pain, or headache      Medications:    Infusion Medications    sodium chloride       Scheduled Medications    sodium chloride flush  5-40 mL IntraVENous 2 times per day    enoxaparin  40 mg SubCUTAneous Daily    atorvastatin  40 mg Oral Nightly    aspirin  81 mg Oral Daily    Or    aspirin  300 mg Rectal Daily    levothyroxine  125 mcg Oral Daily     PRN Meds: sodium chloride flush, sodium chloride, ondansetron **OR** ondansetron, polyethylene glycol    Intake/Output Summary (Last 24 hours) at 2/18/2024 1627  Last data filed at 2/17/2024 2259  Gross per 24 hour   Intake 1050 ml   Output --   Net 1050 ml     Exam:  BP (!) 93/59   Pulse 75   Temp 99.3 °F (37.4 °C) (Oral)   Resp 20   Ht 1.6 m (5' 2.99\")   Wt 67.1 kg (147 lb 14.9 oz)   LMP 12/08/2019 (Exact Date)   SpO2 95%   BMI 26.21 kg/m²   Head: Normocephalic, atraumatic  Sclera clear  Neck JVD flat  Lungs: Normal effort of breathing  Pain/tenderness by umbilical site, drainage    Labs:   Recent Labs     02/17/24  1930 02/18/24  0548   WBC 9.6 9.2   HGB 12.0 11.9*   HCT 37.5 36.2*    378     Recent Labs     02/17/24  1930 02/18/24  0547    139   K 3.8 3.6    104   CO2 30 27   BUN 8 7   CREATININE 0.81 0.65   CALCIUM 8.4* 8.2*   AST 28 35   ALT 29 32   BILITOT <0.2 <0.2   ALKPHOS 78 66     No results for input(s): \"INR\" in the last 72 hours.  No results for input(s): \"CKTOTAL\", \"TROPONINI\" in the last 72 hours.  Radiology:  CTA HEAD W WO CONTRAST   Final Result   1. No acute intracranial abnormality.   2. No large vessel occlusion, hemodynamically significant stenosis,   aneurysmal dilatation or vascular malformation.         CTA NECK W WO CONTRAST   Final Result   No large vessel

## 2024-02-19 ENCOUNTER — APPOINTMENT (OUTPATIENT)
Dept: MRI IMAGING | Age: 49
DRG: 069 | End: 2024-02-19
Payer: COMMERCIAL

## 2024-02-19 PROBLEM — L03.316 CELLULITIS OF UMBILICUS: Status: ACTIVE | Noted: 2024-02-19

## 2024-02-19 PROBLEM — R53.1 LEFT-SIDED WEAKNESS: Status: ACTIVE | Noted: 2024-02-19

## 2024-02-19 LAB
ALBUMIN SERPL-MCNC: 3.4 G/DL (ref 3.5–4.6)
ALP SERPL-CCNC: 66 U/L (ref 40–130)
ALT SERPL-CCNC: 48 U/L (ref 0–33)
ANION GAP SERPL CALCULATED.3IONS-SCNC: 8 MEQ/L (ref 9–15)
AST SERPL-CCNC: 50 U/L (ref 0–35)
BACTERIA URNS QL MICRO: ABNORMAL /HPF
BILIRUB SERPL-MCNC: <0.2 MG/DL (ref 0.2–0.7)
BILIRUB UR QL STRIP: NEGATIVE
BUN SERPL-MCNC: 9 MG/DL (ref 6–20)
CALCIUM SERPL-MCNC: 8.3 MG/DL (ref 8.5–9.9)
CHLORIDE SERPL-SCNC: 102 MEQ/L (ref 95–107)
CLARITY UR: CLEAR
CO2 SERPL-SCNC: 27 MEQ/L (ref 20–31)
COLOR UR: YELLOW
CREAT SERPL-MCNC: 0.66 MG/DL (ref 0.5–0.9)
EKG ATRIAL RATE: 68 BPM
EKG P AXIS: 7 DEGREES
EKG P-R INTERVAL: 136 MS
EKG Q-T INTERVAL: 430 MS
EKG QRS DURATION: 80 MS
EKG QTC CALCULATION (BAZETT): 457 MS
EKG R AXIS: 70 DEGREES
EKG T AXIS: 92 DEGREES
EKG VENTRICULAR RATE: 68 BPM
EPI CELLS #/AREA URNS AUTO: ABNORMAL /HPF (ref 0–5)
ERYTHROCYTE [DISTWIDTH] IN BLOOD BY AUTOMATED COUNT: 13.3 % (ref 11.5–14.5)
GLOBULIN SER CALC-MCNC: 2.8 G/DL (ref 2.3–3.5)
GLUCOSE SERPL-MCNC: 88 MG/DL (ref 70–99)
GLUCOSE UR STRIP-MCNC: NEGATIVE MG/DL
HCT VFR BLD AUTO: 40.9 % (ref 37–47)
HGB BLD-MCNC: 12.9 G/DL (ref 12–16)
HGB UR QL STRIP: ABNORMAL
HYALINE CASTS #/AREA URNS AUTO: ABNORMAL /HPF (ref 0–5)
KETONES UR STRIP-MCNC: NEGATIVE MG/DL
LEUKOCYTE ESTERASE UR QL STRIP: ABNORMAL
MCH RBC QN AUTO: 28.2 PG (ref 27–31.3)
MCHC RBC AUTO-ENTMCNC: 31.5 % (ref 33–37)
MCV RBC AUTO: 89.5 FL (ref 79.4–94.8)
NITRITE UR QL STRIP: POSITIVE
PH UR STRIP: 6 [PH] (ref 5–9)
PLATELET # BLD AUTO: 396 K/UL (ref 130–400)
POTASSIUM SERPL-SCNC: 3.8 MEQ/L (ref 3.4–4.9)
PROT SERPL-MCNC: 6.2 G/DL (ref 6.3–8)
PROT UR STRIP-MCNC: NEGATIVE MG/DL
RBC # BLD AUTO: 4.57 M/UL (ref 4.2–5.4)
RBC #/AREA URNS AUTO: ABNORMAL /HPF (ref 0–5)
SODIUM SERPL-SCNC: 137 MEQ/L (ref 135–144)
SP GR UR STRIP: 1.02 (ref 1–1.03)
T4 FREE SERPL-MCNC: 0.26 NG/DL (ref 0.84–1.68)
TSH SERPL-MCNC: 600.6 UIU/ML (ref 0.44–3.86)
UROBILINOGEN UR STRIP-ACNC: 0.2 E.U./DL
WBC # BLD AUTO: 7.5 K/UL (ref 4.8–10.8)
WBC #/AREA URNS AUTO: ABNORMAL /HPF (ref 0–5)

## 2024-02-19 PROCEDURE — 93010 ELECTROCARDIOGRAM REPORT: CPT | Performed by: INTERNAL MEDICINE

## 2024-02-19 PROCEDURE — 85027 COMPLETE CBC AUTOMATED: CPT

## 2024-02-19 PROCEDURE — 6370000000 HC RX 637 (ALT 250 FOR IP)

## 2024-02-19 PROCEDURE — 92610 EVALUATE SWALLOWING FUNCTION: CPT

## 2024-02-19 PROCEDURE — 84443 ASSAY THYROID STIM HORMONE: CPT

## 2024-02-19 PROCEDURE — 6360000002 HC RX W HCPCS: Performed by: FAMILY MEDICINE

## 2024-02-19 PROCEDURE — 95816 EEG AWAKE AND DROWSY: CPT

## 2024-02-19 PROCEDURE — 84439 ASSAY OF FREE THYROXINE: CPT

## 2024-02-19 PROCEDURE — 97116 GAIT TRAINING THERAPY: CPT

## 2024-02-19 PROCEDURE — 99222 1ST HOSP IP/OBS MODERATE 55: CPT

## 2024-02-19 PROCEDURE — 80053 COMPREHEN METABOLIC PANEL: CPT

## 2024-02-19 PROCEDURE — 81001 URINALYSIS AUTO W/SCOPE: CPT

## 2024-02-19 PROCEDURE — APPSS30 APP SPLIT SHARED TIME 16-30 MINUTES: Performed by: NURSE PRACTITIONER

## 2024-02-19 PROCEDURE — 70551 MRI BRAIN STEM W/O DYE: CPT

## 2024-02-19 PROCEDURE — 36415 COLL VENOUS BLD VENIPUNCTURE: CPT

## 2024-02-19 PROCEDURE — 99232 SBSQ HOSP IP/OBS MODERATE 35: CPT | Performed by: PSYCHIATRY & NEUROLOGY

## 2024-02-19 PROCEDURE — 87086 URINE CULTURE/COLONY COUNT: CPT

## 2024-02-19 PROCEDURE — 2580000003 HC RX 258: Performed by: INTERNAL MEDICINE

## 2024-02-19 PROCEDURE — 99222 1ST HOSP IP/OBS MODERATE 55: CPT | Performed by: PHYSICIAN ASSISTANT

## 2024-02-19 PROCEDURE — 1210000000 HC MED SURG R&B

## 2024-02-19 PROCEDURE — 6360000002 HC RX W HCPCS: Performed by: INTERNAL MEDICINE

## 2024-02-19 PROCEDURE — 92523 SPEECH SOUND LANG COMPREHEN: CPT

## 2024-02-19 PROCEDURE — 99213 OFFICE O/P EST LOW 20 MIN: CPT

## 2024-02-19 PROCEDURE — 6370000000 HC RX 637 (ALT 250 FOR IP): Performed by: INTERNAL MEDICINE

## 2024-02-19 RX ORDER — LEVOTHYROXINE SODIUM 0.15 MG/1
300 TABLET ORAL DAILY
Status: DISCONTINUED | OUTPATIENT
Start: 2024-02-20 | End: 2024-02-20 | Stop reason: HOSPADM

## 2024-02-19 RX ORDER — CEPHALEXIN 500 MG/1
500 CAPSULE ORAL EVERY 12 HOURS SCHEDULED
Status: DISCONTINUED | OUTPATIENT
Start: 2024-02-19 | End: 2024-02-20 | Stop reason: HOSPADM

## 2024-02-19 RX ORDER — LEVOTHYROXINE SODIUM 0.1 MG/1
200 TABLET ORAL DAILY
Status: DISCONTINUED | OUTPATIENT
Start: 2024-02-20 | End: 2024-02-19

## 2024-02-19 RX ORDER — LORAZEPAM 2 MG/ML
1 INJECTION INTRAMUSCULAR ONCE
Status: COMPLETED | OUTPATIENT
Start: 2024-02-19 | End: 2024-02-19

## 2024-02-19 RX ADMIN — ASPIRIN 81 MG 81 MG: 81 TABLET ORAL at 08:41

## 2024-02-19 RX ADMIN — SODIUM CHLORIDE, PRESERVATIVE FREE 10 ML: 5 INJECTION INTRAVENOUS at 20:28

## 2024-02-19 RX ADMIN — CEPHALEXIN 500 MG: 500 CAPSULE ORAL at 17:35

## 2024-02-19 RX ADMIN — CEPHALEXIN 500 MG: 500 CAPSULE ORAL at 20:28

## 2024-02-19 RX ADMIN — ENOXAPARIN SODIUM 40 MG: 100 INJECTION SUBCUTANEOUS at 08:41

## 2024-02-19 RX ADMIN — SODIUM CHLORIDE, PRESERVATIVE FREE 10 ML: 5 INJECTION INTRAVENOUS at 08:41

## 2024-02-19 RX ADMIN — Medication 1 MG: at 14:20

## 2024-02-19 RX ADMIN — ATORVASTATIN CALCIUM 40 MG: 40 TABLET, FILM COATED ORAL at 20:28

## 2024-02-19 RX ADMIN — LEVOTHYROXINE SODIUM 125 MCG: 0.03 TABLET ORAL at 05:00

## 2024-02-19 ASSESSMENT — ENCOUNTER SYMPTOMS
DIARRHEA: 0
NAUSEA: 0
SORE THROAT: 0
WHEEZING: 0
TROUBLE SWALLOWING: 0
SINUS PRESSURE: 0
RHINORRHEA: 0
COLOR CHANGE: 0
ABDOMINAL PAIN: 0
VOMITING: 0
COUGH: 0
SHORTNESS OF BREATH: 0
CHEST TIGHTNESS: 0

## 2024-02-19 ASSESSMENT — PAIN SCALES - GENERAL
PAINLEVEL_OUTOF10: 6
PAINLEVEL_OUTOF10: 0

## 2024-02-19 ASSESSMENT — PAIN DESCRIPTION - LOCATION: LOCATION: UMBILICUS

## 2024-02-19 NOTE — CONSULTS
PTT 27.4 12/07/2012 12:24 PM   [APTT}  PT/INR:    Lab Results   Component Value Date/Time    PROTIME 12.2 08/18/2021 02:30 PM    INR 0.9 08/18/2021 02:30 PM     HgBA1c:    Lab Results   Component Value Date/Time    LABA1C 5.6 02/18/2024 05:47 AM       Assessment   Darrel Risk Score: Darrel Scale Score: 20    Patient Active Problem List   Diagnosis    Stroke-like episode    Pituitary adenoma (HCC)    Hypothyroidism    Hyperprolactinemia (HCC)    Paresthesia    Ataxic gait    Acute low back pain    Closed fracture of distal phalanx of little finger    Dislocation of hand joint    Encounter for issue of repeat prescription    Long term (current) use of aspirin    Nausea and vomiting    Other long term (current) drug therapy    Stroke-like symptoms       Measurements:  Wound 02/17/24 Abdomen Lower;Medial Mild purulant drainage from umbilicus (Active)   Wound Etiology Other 02/18/24 0355   Drainage Amount Scant (moist but unmeasurable) 02/18/24 0355   Drainage Description Purulent 02/18/24 0355   Number of days: 2          Assessment: Patient sitting up in bed upon entering room. Wound inside belly button appears to be healed, but could still smell mild odor and see dried drainage around the area. Cleansed wound with saline and packed with alginate. Covered with foam border dressing. To be changed every other day. Advised patient when she goes home to clean belly button well with soap and water and make sure she also dries the area very well. Will start on 7 day course of keflex.   Plan   Plan of Care: Wound 02/17/24 Abdomen Lower;Medial Mild purulant drainage from umbilicus-Dressing/Treatment:  (MAGY)    Specialty Bed Required : N/A   [] Low Air Loss   [] Pressure Redistribution  [] Fluid Immersion  [] Bariatric  [] Other:     Current Diet: ADULT DIET; Easy to Chew  Dietician consult:  N/A    Discharge Plan:  Placement for patient upon discharge: home with support    Patient appropriate for Outpatient Wound Care  Center: N/A    Electronically signed by PELON Taveras CNP on 2/19/2024 at 11:04 AM

## 2024-02-19 NOTE — PROGRESS NOTES
[facility]   Facility/Department: [address] TELE  Speech Language Pathology  Initial Speech/Language/Cognitive Assessment    NAME:Justa Garay  : 1975 (49 y.o.)   [x]   confirmed    MRN: [id number]  ROOM: [place] [address]  ADMISSION DATE: 2024  PATIENT DIAGNOSIS(ES): Ataxic gait [R26.0]  Left-sided weakness [R53.1]  Polysubstance abuse (HCC) [F19.10]  Stroke-like symptoms [R29.90]  Hypothyroidism, unspecified type [E03.9]  Chief Complaint   Patient presents with    Extremity Weakness     Left arm; x2days    Left facial weakness and drooling     Patient Active Problem List    Diagnosis Date Noted    Cellulitis of umbilicus 2024    Left-sided weakness 2024    Stroke-like symptoms 2024    Acute low back pain 2021    Closed fracture of distal phalanx of little finger 2021    Dislocation of hand joint 2021    Nausea and vomiting 2021    Paresthesia     Ataxic gait     Pituitary adenoma (HCC)     Hypothyroidism     Hyperprolactinemia (HCC)     Stroke-like episode 2021    Encounter for issue of repeat prescription 2019    Long term (current) use of aspirin 2019    Other long term (current) drug therapy 2019     Past Medical History:   Diagnosis Date    Hypertension     Thyroid disease      Past Surgical History:   Procedure Laterality Date    CHOLECYSTECTOMY      TUBAL LIGATION         Date of Onset: 24    Date of Evaluation: 2024   Evaluating Therapist: LUC Neil        Diagnosis: Pt p/w mild cognitive-linguistic impairment with impaired ability to follow 3-step directions, impaired working memory and short term recall and patient needs further assessment in problem solving and abstract reasoning. Pt was independent with IADLs PTA and would benefit from further assessment and intervention with skilled speech therapy.    Requires SLP Intervention: Yes     D/C Recommendations: Ongoing speech therapy is recommended

## 2024-02-19 NOTE — PROGRESS NOTES
Physical Therapy Med Surg Daily Treatment Note  Facility/Department: [address] ORTHO TELE  [facility]       NAME: Justa Garay  : 1975 (49 y.o.)  MRN: [id number]  CODE STATUS: Full Code    Date of Service: 2024    Patient Diagnosis(es): Ataxic gait [R26.0]  Left-sided weakness [R53.1]  Polysubstance abuse (HCC) [F19.10]  Stroke-like symptoms [R29.90]  Hypothyroidism, unspecified type [E03.9]   Chief Complaint   Patient presents with    Extremity Weakness     Left arm; x2days    Left facial weakness and drooling     Patient Active Problem List    Diagnosis Date Noted    Cellulitis of umbilicus 2024    Stroke-like symptoms 2024    Acute low back pain 2021    Closed fracture of distal phalanx of little finger 2021    Dislocation of hand joint 2021    Nausea and vomiting 2021    Paresthesia     Ataxic gait     Pituitary adenoma (HCC)     Hypothyroidism     Hyperprolactinemia (HCC)     Stroke-like episode 2021    Encounter for issue of repeat prescription 2019    Long term (current) use of aspirin 2019    Other long term (current) drug therapy 2019        Past Medical History:   Diagnosis Date    Hypertension     Thyroid disease      Past Surgical History:   Procedure Laterality Date    CHOLECYSTECTOMY      TUBAL LIGATION         Chart Reviewed: Yes  Family / Caregiver Present: No    Restrictions:  Restrictions/Precautions: Fall Risk    SUBJECTIVE:   Subjective: \"I have no clothes and have not showered, so I am not leaving this room.\"    Pain  Pain: Pt denies pain pre and post session. Just bilateral foot numbness.    OBJECTIVE:        Bed mobility  Supine to Sit: Modified independent  Sit to Supine: Modified independent  Bed Mobility Comments: HOB slightly elevated without use of bed rails.    Transfers  Sit to Stand: Stand by assistance (touch assist)  Stand to Sit: Stand by assistance (touch assist)  Comment: No AD, pt refused to use WW for

## 2024-02-19 NOTE — PROGRESS NOTES
Wound Ostomy Continence Nurse  Consult Note       NAME:  Justa Garay  MEDICAL RECORD NUMBER:  [id number]  AGE: 49 y.o.   GENDER: female  : 1975  TODAY'S DATE:  2024    Subjective   Reason for WOC Nurse Evaluation and Assessment: Umbilicus      Justa Garay is a 49 y.o. female referred by:   [x] Physician  [] Nursing  [] Other:     Wound Identification:  Wound Type:  healing abscess  Contributing Factors:  undetermined    Wound History: Patient admitted to [facility] with a healing abscess to the umbilicus. Per patient, she has had small \"boils\" in the past that usually resolve. This particular abscess has been present for 1 month.  Current Wound Care Treatment:  Recommending 1) MWF dressing change consisting of calcium alginate ag with border foam covering dressing 2) IP consult to Wound NP to follow until wound is healed and for possible PO antibiotic coverage for scant amount of purulent drainage    Patient Goal of Care:  [x] Wound Healing  [] Odor Control  [] Palliative Care  [] Pain Control   [] Other:         PAST MEDICAL HISTORY        Diagnosis Date    Hypertension     Thyroid disease        PAST SURGICAL HISTORY    Past Surgical History:   Procedure Laterality Date    CHOLECYSTECTOMY      TUBAL LIGATION         FAMILY HISTORY    History reviewed. No pertinent family history.    SOCIAL HISTORY    Social History     Tobacco Use    Smoking status: Every Day     Current packs/day: 0.50     Average packs/day: 0.5 packs/day for 23.0 years (11.5 ttl pk-yrs)     Types: Cigarettes    Smokeless tobacco: Never   Vaping Use    Vaping Use: Never used   Substance Use Topics    Alcohol use: Yes     Comment: 3xwk    Drug use: Yes     Types: Marijuana (Weed), Methamphetamines (Crystal Meth)       ALLERGIES    No Known Allergies    MEDICATIONS    No current facility-administered medications on file prior to encounter.     Current Outpatient Medications on File Prior to Encounter   Medication Sig

## 2024-02-19 NOTE — PROGRESS NOTES
No  Assessment Method(s): Observation;Palpation  Vocal Quality: Hoarse  Consistency Presented: Thin  How Presented: Self-fed/presented;Cup/sip  How much presented:  (4 oz)  Bolus Acceptance: No impairment  Bolus Formation/Control: No impairment  Propulsion: No impairment  Oral Residue: None  Initiation of Swallow: No impairment  Laryngeal Elevation:  (present)  Aspiration Signs/Symptoms: None  Pharyngeal Phase Characteristics: No impairment, issues, or problems  Effective Modifications: None  Additional PO Trials: 1      PO Trials 2  Consistency Presented: Regular;Soft & Bite Sized;Pureed  How Presented: Self-fed/presented  How much presented:  (puree x3, soft solids x5, regular solids x2 cookies)  Bolus Acceptance: No impairment  Bolus Formation/Control: No impairment  Propulsion: No impairment  Oral Residue: None  Initiation of Swallow: No impairment  Aspiration Signs/Symptoms: None  Pharyngeal Phase Characteristics: No impairment, issues, or problems  Effective Modifications: Alternate liquids/solids;Small sips and bites                           Dysphagia Diagnosis  Dysphagia Diagnosis: Swallow function appears WFL  Dysphagia Impression : Pt p/w oral and pharyngeal phase of the swallow WFL via observation and palpation with no overt s/s of aspiration per all consistencies tested. Pt had adequate oral bolus clearance, hyolaryngeal elevation present and no overt s/s of aspiration. Recommend PO diet with use of compensatory swallow strategies.  Dysphagia Outcome Severity Scale: Level 7: Normal in all situations     Recommendations  Requires SLP Intervention: No  D/C Recommendations: No follow up therapy recommended post discharge  Diet Solids Recommendation: Regular  Liquid Consistency Recommendation: Thin  Compensatory Swallowing Strategies : Alternate solids and liquids;Eat/Feed slowly;Upright as possible for all oral intake;Small bites/sips  Recommended Form of Meds: Whole with water  Duration of Treatment: No  f/u  Frequency of Treatment: No f/u    Prognosis  Speech Therapy Prognosis  Prognosis: Good  Prognosis Considerations: Age;Previous Level of Function    Education  Patient Education: pt educated on results of testing  Patient Education Response: Verbalizes understanding  Individuals consulted  Consulted and agree with results and recommendations: Patient;RN  RN Name: lenora    Safety Devices  Safety Devices  Safety Devices in place: Yes  Type of devices: All fall risk precautions in place    Pain Assessment  Patient does not c/o pain.    Pain Re-assessment  Patient does not c/o pain.      Therapy Time  SLP Individual Minutes  Time In: 1025  Time Out: 1035  Minutes: 10              Signature: Electronically signed by LUC Neil on 2/19/2024 at 12:14 PM

## 2024-02-19 NOTE — CARE COORDINATION
MET WITH PT AT BEDSIDE. PT WOULD LIKE C AT DISCHARGE. FOC IS OFFERED, PT WOULD LIKE [facility]. WILL NEED ORDERS AND ACCEPTANCE BY [facility]. WILL CONTINUE TO FOLLOW.

## 2024-02-19 NOTE — PROGRESS NOTES
Component Value Date/Time    NITRU POSITIVE 02/19/2024 02:12 AM    WBCUA 10-20 02/19/2024 02:12 AM    BACTERIA RARE 02/19/2024 02:12 AM    RBCUA 6-10 02/19/2024 02:12 AM    BLOODU SMALL 02/19/2024 02:12 AM    SPECGRAV 1.016 02/19/2024 02:12 AM    GLUCOSEU Negative 02/19/2024 02:12 AM       Radiology:   Most recent    EEG No valid procedures specified.    MRI of Brain No results found for this or any previous visit.  Results for orders placed during the hospital encounter of 08/18/21    MRI brain without contrast    Narrative  EXAMINATION: MRI BRAIN WO CONTRAST    CLINICAL HISTORY:  stroke    COMPARISONS: NONE AVAILABLE    TECHNIQUE:  Brain CT from August 18, 2021    Multiplanar multisequence images of the brain were obtained without contrast. Diffusion perfusion imaging was obtained.    FINDINGS:    There are no extra-axial collections. There is no evidence of hemorrhage. There are no areas of perfusion diffusion signal abnormality to suggest ischemia. The susceptibility images do not demonstrate evidence of hemosiderin deposition within the brain  parenchyma or the leptomeninges.    There is preservation of the gray-white matter differentiation. There is an area of mildly increased T2/STIR signal in the subcortical white matter, corona radiata of the left frontal lobe. There is no associated edema or mass effect. The sulci and  ventricles are within normal limits without evidence of hydrocephalus.  The midline structures are intact, the corpus callosum is within normal limits.  The pineal gland is unremarkable.    There is  homogeneous solid soft tissue enlargement of the pituitary gland which measures 11 x 12 x 16 mm. The pituitary extends cephalad past the diaphragm sella and is abutting and possibly mildly displacing the optic chiasm.    There are no space-occupying lesions in the posterior fossa. The basilar cisterns are patent. The craniocervical junction is unremarkable.    The visualized portions of the  weakness greater on the left and myopathy in the setting of cocaine abuse and significant hypothyroidism  CTA of the head and neck negative for acute findings  MRI of the brain pending to rule out CVA, vasculitis in the setting of cocaine use  TSH noted to be 600 with low free T4.  Currently on levothyroxine.  Patient with history of pituitary adenoma.  Endocrinology following.  Patient noncompliant with medications prior to presentation  Will await MRI results    I have personally performed a face to face diagnostic evaluation on this patient, reviewed all data and investigations, and am the sole provider of all clinical decisions on the neurological status of this patient.  Generalized weakness with left-sided weakness include likely related to myopathy.  MRI of the brain is pending.  Vasculitis may be associate some cocaine use as well.  History of Petri adenoma.  TSH is significantly elevated.  60% time spent on evaluating.      Drew Ramirez MD, ALEJO  Diplomate, American Board of Psychiatry & Neurology  Board Certified in Vascular Neurology  Board Certified in Neuromuscular Medicine  Certified in Neurorehabilitation         Collaborating physicians: Dr Ramirez    Electronically signed by PELON Celestin CNP on 2/19/2024 at 2:26 PM

## 2024-02-20 VITALS
HEIGHT: 63 IN | BODY MASS INDEX: 26.21 KG/M2 | HEART RATE: 88 BPM | RESPIRATION RATE: 18 BRPM | DIASTOLIC BLOOD PRESSURE: 65 MMHG | OXYGEN SATURATION: 98 % | SYSTOLIC BLOOD PRESSURE: 110 MMHG | WEIGHT: 147.93 LBS | TEMPERATURE: 98.5 F

## 2024-02-20 LAB
ALBUMIN SERPL-MCNC: 3.6 G/DL (ref 3.5–4.6)
ALP SERPL-CCNC: 67 U/L (ref 40–130)
ALT SERPL-CCNC: 39 U/L (ref 0–33)
ANION GAP SERPL CALCULATED.3IONS-SCNC: 9 MEQ/L (ref 9–15)
AST SERPL-CCNC: 29 U/L (ref 0–35)
BACTERIA UR CULT: NORMAL
BILIRUB SERPL-MCNC: <0.2 MG/DL (ref 0.2–0.7)
BUN SERPL-MCNC: 10 MG/DL (ref 6–20)
CALCIUM SERPL-MCNC: 9 MG/DL (ref 8.5–9.9)
CHLORIDE SERPL-SCNC: 103 MEQ/L (ref 95–107)
CO2 SERPL-SCNC: 28 MEQ/L (ref 20–31)
CREAT SERPL-MCNC: 0.64 MG/DL (ref 0.5–0.9)
ERYTHROCYTE [DISTWIDTH] IN BLOOD BY AUTOMATED COUNT: 13.2 % (ref 11.5–14.5)
GLOBULIN SER CALC-MCNC: 2.9 G/DL (ref 2.3–3.5)
GLUCOSE SERPL-MCNC: 103 MG/DL (ref 70–99)
HCT VFR BLD AUTO: 41.1 % (ref 37–47)
HGB BLD-MCNC: 12.9 G/DL (ref 12–16)
MCH RBC QN AUTO: 28.2 PG (ref 27–31.3)
MCHC RBC AUTO-ENTMCNC: 31.4 % (ref 33–37)
MCV RBC AUTO: 89.9 FL (ref 79.4–94.8)
PLATELET # BLD AUTO: 432 K/UL (ref 130–400)
POTASSIUM SERPL-SCNC: 4 MEQ/L (ref 3.4–4.9)
PROT SERPL-MCNC: 6.5 G/DL (ref 6.3–8)
RBC # BLD AUTO: 4.57 M/UL (ref 4.2–5.4)
SODIUM SERPL-SCNC: 140 MEQ/L (ref 135–144)
WBC # BLD AUTO: 9.9 K/UL (ref 4.8–10.8)

## 2024-02-20 PROCEDURE — 6370000000 HC RX 637 (ALT 250 FOR IP): Performed by: PHYSICIAN ASSISTANT

## 2024-02-20 PROCEDURE — 2580000003 HC RX 258: Performed by: INTERNAL MEDICINE

## 2024-02-20 PROCEDURE — 85027 COMPLETE CBC AUTOMATED: CPT

## 2024-02-20 PROCEDURE — 6370000000 HC RX 637 (ALT 250 FOR IP): Performed by: INTERNAL MEDICINE

## 2024-02-20 PROCEDURE — 80053 COMPREHEN METABOLIC PANEL: CPT

## 2024-02-20 PROCEDURE — APPSS15 APP SPLIT SHARED TIME 0-15 MINUTES: Performed by: NURSE PRACTITIONER

## 2024-02-20 PROCEDURE — 6360000002 HC RX W HCPCS: Performed by: INTERNAL MEDICINE

## 2024-02-20 PROCEDURE — 97116 GAIT TRAINING THERAPY: CPT

## 2024-02-20 PROCEDURE — 6370000000 HC RX 637 (ALT 250 FOR IP)

## 2024-02-20 PROCEDURE — 97129 THER IVNTJ 1ST 15 MIN: CPT

## 2024-02-20 PROCEDURE — 99232 SBSQ HOSP IP/OBS MODERATE 35: CPT | Performed by: PSYCHIATRY & NEUROLOGY

## 2024-02-20 PROCEDURE — 36415 COLL VENOUS BLD VENIPUNCTURE: CPT

## 2024-02-20 RX ORDER — ASPIRIN 81 MG/1
81 TABLET, CHEWABLE ORAL DAILY
Qty: 30 TABLET | Refills: 3 | Status: SHIPPED | OUTPATIENT
Start: 2024-02-21

## 2024-02-20 RX ORDER — ATORVASTATIN CALCIUM 40 MG/1
40 TABLET, FILM COATED ORAL NIGHTLY
Qty: 30 TABLET | Refills: 3 | Status: SHIPPED | OUTPATIENT
Start: 2024-02-20

## 2024-02-20 RX ORDER — CEPHALEXIN 500 MG/1
500 CAPSULE ORAL EVERY 12 HOURS SCHEDULED
Qty: 11 CAPSULE | Refills: 0 | Status: SHIPPED | OUTPATIENT
Start: 2024-02-20 | End: 2024-02-26

## 2024-02-20 RX ORDER — ZOLPIDEM TARTRATE 10 MG/1
10 TABLET ORAL NIGHTLY PRN
Qty: 14 TABLET | Refills: 0 | Status: SHIPPED | OUTPATIENT
Start: 2024-02-20 | End: 2024-03-05

## 2024-02-20 RX ORDER — ACETAMINOPHEN 325 MG/1
650 TABLET ORAL EVERY 4 HOURS PRN
Status: DISCONTINUED | OUTPATIENT
Start: 2024-02-20 | End: 2024-02-20 | Stop reason: HOSPADM

## 2024-02-20 RX ORDER — LEVOTHYROXINE SODIUM 300 UG/1
300 TABLET ORAL DAILY
Qty: 30 TABLET | Refills: 3 | Status: SHIPPED | OUTPATIENT
Start: 2024-02-21

## 2024-02-20 RX ADMIN — SODIUM CHLORIDE, PRESERVATIVE FREE 10 ML: 5 INJECTION INTRAVENOUS at 10:12

## 2024-02-20 RX ADMIN — LEVOTHYROXINE SODIUM 300 MCG: 0.15 TABLET ORAL at 06:09

## 2024-02-20 RX ADMIN — CEPHALEXIN 500 MG: 500 CAPSULE ORAL at 10:11

## 2024-02-20 RX ADMIN — ENOXAPARIN SODIUM 40 MG: 100 INJECTION SUBCUTANEOUS at 10:11

## 2024-02-20 RX ADMIN — ASPIRIN 81 MG 81 MG: 81 TABLET ORAL at 10:11

## 2024-02-20 RX ADMIN — ACETAMINOPHEN 325MG 650 MG: 325 TABLET ORAL at 02:38

## 2024-02-20 ASSESSMENT — ENCOUNTER SYMPTOMS
COLOR CHANGE: 0
NAUSEA: 0
VOMITING: 0
CHEST TIGHTNESS: 0
WHEEZING: 0
SHORTNESS OF BREATH: 0
COUGH: 0
TROUBLE SWALLOWING: 0

## 2024-02-20 ASSESSMENT — PAIN DESCRIPTION - LOCATION: LOCATION: UMBILICUS

## 2024-02-20 ASSESSMENT — PAIN SCALES - GENERAL
PAINLEVEL_OUTOF10: 3
PAINLEVEL_OUTOF10: 5

## 2024-02-20 NOTE — CARE COORDINATION
SPOKE WITH MARTHA AT [facility], CAN ACCEPT PT. WILL NEED HHC ORDERS, (SN-WOUND CARE, PT/OT).

## 2024-02-20 NOTE — PROGRESS NOTES
Hospitalist Progress Note      Date of Admission: 2/17/2024  Chief Complaint:    Chief Complaint   Patient presents with    Extremity Weakness     Left arm; x2days    Left facial weakness and drooling     Subjective:  No new complaints.  No nausea, vomiting, chest pain, or headache. Has left upper extremity tingling, subjective weakness.s      Medications:    Infusion Medications    sodium chloride       Scheduled Medications    cephALEXin  500 mg Oral 2 times per day    levothyroxine  300 mcg Oral Daily    sodium chloride flush  5-40 mL IntraVENous 2 times per day    enoxaparin  40 mg SubCUTAneous Daily    atorvastatin  40 mg Oral Nightly    aspirin  81 mg Oral Daily    Or    aspirin  300 mg Rectal Daily     PRN Meds: acetaminophen, sodium chloride flush, sodium chloride, ondansetron **OR** ondansetron, polyethylene glycol    Intake/Output Summary (Last 24 hours) at 2/20/2024 0648  Last data filed at 2/19/2024 1230  Gross per 24 hour   Intake 240 ml   Output --   Net 240 ml     Exam:  /72   Pulse 74   Temp 97.9 °F (36.6 °C) (Oral)   Resp 16   Ht 1.6 m (5' 2.99\")   Wt 67.1 kg (147 lb 14.9 oz)   LMP 12/08/2019 (Exact Date)   SpO2 99%   BMI 26.21 kg/m²   Head: Normocephalic, atraumatic  Sclera clear  Neck JVD flat  Lungs: Normal effort of breathing  Pain/tenderness by umbilical site, drainage    Labs:   Recent Labs     02/18/24  0548 02/19/24  0459 02/20/24  0525   WBC 9.2 7.5 9.9   HGB 11.9* 12.9 12.9   HCT 36.2* 40.9 41.1    396 432*     Recent Labs     02/18/24  0547 02/19/24  0459 02/20/24  0525    137 140   K 3.6 3.8 4.0    102 103   CO2 27 27 28   BUN 7 9 10   CREATININE 0.65 0.66 0.64   CALCIUM 8.2* 8.3* 9.0   AST 35 50* 29   ALT 32 48* 39*   BILITOT <0.2 <0.2 <0.2   ALKPHOS 66 66 67     No results for input(s): \"INR\" in the last 72 hours.  No results for input(s): \"CKTOTAL\", \"TROPONINI\" in the last 72 hours.  Radiology:  MRI BRAIN WO CONTRAST   Final Result   No acute

## 2024-02-20 NOTE — PROGRESS NOTES
levels  Independent = pt does not require any physical supervision or assistance from another person for activity completion. Device may be needed.  Stand by assistance = pt requires verbal cues or instructions from another person, close to but not touching, to perform the activity  Minimal assistance= pt performs 75% or more of the activity; assistance is required to complete the activity  Moderate assistance= pt performs 50% of the activity; assistance is required to complete the activity  Maximal assistance = pt performs 25% of the activity; assistance is required to complete the activity  Dependent = pt requires total physical assistance to accomplish the task

## 2024-02-20 NOTE — CARE COORDINATION
CALL PLACED TO [facility] TO CLARIFY HOME CARE ORDERS. SPOKE WITH MARTHA AND WAS NOTIFIED THAT THEY ARE NOT ABLE TO ACCEPT PT D/T OUT OF NETWORK WITH INSURANCE.   FOC OFFERED, REFERRAL SENT TO [facility]. AWAITING ACCEPTANCE/   SPOKE WITH KAMINI AT [facility], UNABLE TO ACCEPT PT. DO NOT TAKE INSURANCE.     REFERRAL SENT TO [facility], UNABLE TO ACCEPT D/T STAFFING.

## 2024-02-20 NOTE — PROGRESS NOTES
[facility] Neurology Daily Progress Note  Name: Justa Garay  Age: 49 y.o.  Gender: female  CodeStatus: Full Code  Allergies: No Known Allergies    Chief Complaint:Extremity Weakness (Left arm; x2days) and Left facial weakness and drooling    Primary Care Provider: Glendy Reyna DO  InpatientTreatment Team: Treatment Team: Attending Provider: Tho Barrientos MD; Consulting Physician: Drew Ramirez MD; Consulting Physician: Mckay Connelly MD; Registered Nurse: Steven eZpeda, RN; Consulting Physician: Holly Wing, APRN - CNP; Registered Nurse: Lata Lantigua RN; Utilization Reviewer: Falguni Pollock RN  Admission Date: 2/17/2024      HPI   Pt seen and examined for neuro follow up.  Alert and oriented x 3, no acute distress, cooperative.  Generalized weakness noted.  Left-sided weakness improving no seizure activity.  Afebrile.  Denies headache.  Blood pressure stable.       as noted above.  Vitals:    02/20/24 1127   BP: 110/65   Pulse: 88   Resp: 18   Temp: 98.5 °F (36.9 °C)   SpO2: 98%        Review of Systems   Constitutional:  Negative for fatigue and fever.   HENT:  Negative for hearing loss and trouble swallowing.    Eyes:  Negative for visual disturbance.   Respiratory:  Negative for cough, chest tightness, shortness of breath and wheezing.    Cardiovascular:  Negative for chest pain, palpitations and leg swelling.   Gastrointestinal:  Negative for nausea and vomiting.   Musculoskeletal:  Negative for gait problem.   Skin:  Negative for color change and rash.   Neurological:  Positive for weakness and numbness. Negative for dizziness, tremors, seizures, syncope, facial asymmetry, speech difficulty, light-headedness and headaches.   Psychiatric/Behavioral:  Negative for agitation, confusion and hallucinations. The patient is not nervous/anxious.          Physical Exam  Nursing note reviewed.   Constitutional:       General: She is not in acute distress.     Appearance: She is not diaphoretic.   HENT:      Medicine  Certified in Neurorehabilitation       Collaborating physicians: Dr Ramirez    Electronically signed by PELON Celestin - CNP on 2/20/2024 at 2:34 PM

## 2024-02-20 NOTE — CONSULTS
<0.2 02/19/2024    ALKPHOS 66 02/19/2024    AST 50 (H) 02/19/2024    ALT 48 (H) 02/19/2024    LABGLOM >60.0 02/19/2024    GFRAA >60.0 01/07/2022    GLOB 2.8 02/19/2024     Lab Results   Component Value Date    WBC 7.5 02/19/2024    HGB 12.9 02/19/2024    HCT 40.9 02/19/2024    MCV 89.5 02/19/2024     02/19/2024     Lab Results   Component Value Date    LABA1C 5.6 02/18/2024    LABA1C 5.6 08/19/2021     Lab Results   Component Value Date    HDL 59 02/18/2024    HDL 65 (H) 08/19/2021    HDL 56 12/07/2012    LDLCALC 122 02/18/2024    LDLCALC 133 (H) 08/19/2021    LDLCALC 116 12/07/2012    CHOL 198 02/18/2024    CHOL 222 (H) 08/19/2021    CHOL 196 12/07/2012    TRIG 86 02/18/2024    TRIG 118 08/19/2021    TRIG 148 12/07/2012     No results found for: \"TESTM\"  Lab Results   Component Value Date    .600 (H) 02/19/2024    .800 (H) 02/18/2024    .200 (H) 01/07/2022    TSHREFLEX 621.700 (H) 02/17/2024    TSHREFLEX 356.700 (H) 01/22/2021    TSHREFLEX 511.000 (H) 08/07/2019    T4FREE 0.26 (L) 02/19/2024    T4FREE 0.30 (L) 02/18/2024    T4FREE 0.11 (L) 02/17/2024     Lab Results   Component Value Date    TPOABS 6.2 08/19/2021         The History and Physical exam, radiologic and laboratory findings have all been discussed with Dr Mckay Connelly, who agrees with the assessment and plan as described above.     Electronicallysigned by DAGOBERTO Schaffer on 2/19/2024 at 7:27 PM

## 2024-02-20 NOTE — PROGRESS NOTES
[facility]  Facility/Department: [facility] 2W ORTHO TELE  Speech Language Pathology   Treatment Note      Justa Garay  1975  W283/W283-01  [x]   confirmed      Date: 2024    Ataxic gait [R26.0]  Left-sided weakness [R53.1]  Polysubstance abuse (HCC) [F19.10]  Stroke-like symptoms [R29.90]  Hypothyroidism, unspecified type [E03.9]    Restrictions/Precautions: Fall Risk    ADULT DIET; Easy to Chew     Respiratory Status:O2 Flow Rate (L/min): 0 L/min (24 0735) Room air  No active isolations      Subjective:  Alert, Cooperative, and Pleasant        Interventions used this date:  Cognitive Skill Development      Objective/Assessment:  Patient progressing towards goals:  Short Term Goals  Time Frame for Short Term Goals: 1 week  Goal 1: Within 1-5 days of implementing the ST POC, the patient's functional reading and writing skills will be assessed in relation to executive functioning (e.g. bill paying, reading rx labels, completing personal information), with additional goals added to patient's POC as deemed necesary by treating SLP.--Not targeted    Goal 2: To address patient's cognitive deficits and promote recall of personal and medical information, the patient will answer questions addressing recent and delayed recall with 80% accuracy and mild verbal cues.--SLP educated pt on strategies of repetition and association.  Pt correctly recalled three objects after a 5 min delay in 1/2 trials when using the association strategy rather than repetition.  Pt required min verbal cues to increase to 2/2.      Goal 3: To increase safety awareness and judgment for safe completion of ADLs secondary to patient's cognitive deficits, patient will complete abstract reasoning tasks (i.e. word deduction, convergent and divergent naming, similarities/differences) with 80% accuracy and mild cues.  Completed divergent naming task with 100% accuracy with min verbal cues.  Provided 2 similarities/differences 75% accuracy  independently and increased to 100% accuracy following min to mod verbal cues.    Goal 4: To address patient's cognitive deficits and promote his/her ability to safely follow directives in a variety of environments, patient will carry out (verbal/written) directives of increasing complexity in everyday activities with 80% accuracy and mild cues.  Pt completed 2 step directions with 70% accuracy I and increased to 100% accuracy following min verbal cues.  Pt completed 3 step directions with 30% accuracy I and increased to 60% accuracy following min verbal cues.  Required mod to max verbal cues to increase to 100% accuracy.      Treatment/Activity Tolerance:  Patient tolerated treatment well    Plan:  Continue per POC    Pain Assessment:  Patient does not c/o pain.    Pain Re-assessment:  Patient does not c/o pain.    Patient/Caregiver Education:  Patient educated on session and progression towards goals.  Patient stated verbal understanding of directions.    Safety Devices:  Bed alarm in place and Call light within reach      Therapy Time  SLP Individual Minutes  Time In: 1450  Time Out: 1505  Minutes: 15            Signature: Electronically signed by LUC Monique on 2/20/2024 at 3:50 PM

## 2024-02-20 NOTE — DISCHARGE SUMMARY
no  evidence of a pneumothorax.    Impression  No acute cardiopulmonary process.      Echo No results found for this or any previous visit.      Disposition: home    In process/preliminary results:  Outstanding Order Results       No orders found from 1/19/2024 to 2/18/2024.            Patient Instructions:   Current Discharge Medication List        START taking these medications    Details   aspirin 81 MG chewable tablet Take 1 tablet by mouth daily  Qty: 30 tablet, Refills: 3      cephALEXin (KEFLEX) 500 MG capsule Take 1 capsule by mouth every 12 hours for 11 doses  Qty: 11 capsule, Refills: 0      atorvastatin (LIPITOR) 40 MG tablet Take 1 tablet by mouth nightly  Qty: 30 tablet, Refills: 3      zolpidem (AMBIEN) 10 MG tablet Take 1 tablet by mouth nightly as needed for Sleep for up to 14 days. Max Daily Amount: 10 mg  Qty: 14 tablet, Refills: 0    Associated Diagnoses: Primary insomnia           CONTINUE these medications which have CHANGED    Details   levothyroxine (SYNTHROID) 300 MCG tablet Take 1 tablet by mouth daily  Qty: 30 tablet, Refills: 3           CONTINUE these medications which have NOT CHANGED    Details   hydroCHLOROthiazide (HYDRODIURIL) 25 MG tablet Take 1 tablet by mouth daily for 7 days  Qty: 7 tablet, Refills: 0      naproxen (NAPROSYN) 500 MG tablet Take 1 tablet by mouth 2 times daily for 20 doses  Qty: 20 tablet, Refills: 0           STOP taking these medications       docusate sodium (COLACE) 100 MG capsule Comments:   Reason for Stopping:         albuterol sulfate HFA (VENTOLIN HFA) 108 (90 Base) MCG/ACT inhaler Comments:   Reason for Stopping:         metoclopramide (REGLAN) 10 MG tablet Comments:   Reason for Stopping:         ibuprofen (ADVIL;MOTRIN) 800 MG tablet Comments:   Reason for Stopping:         rosuvastatin (CRESTOR) 40 MG tablet Comments:   Reason for Stopping:         cabergoline (DOSTINEX) 0.5 MG tablet Comments:   Reason for Stopping:         ondansetron (ZOFRAN ODT) 4  MG disintegrating tablet Comments:   Reason for Stopping:         aspirin EC 81 MG EC tablet Comments:   Reason for Stopping:             Activity: activity as tolerated  Diet: regular diet  Wound Care: outpt wound follow up    Follow-up with PCP 1-2 weeks.    DC time 35 minutes    Signed:  Electronically signed by DAYO LANDERS MD on 2/20/2024 at 12:22 PM

## 2024-02-20 NOTE — PROGRESS NOTES
CLINICAL PHARMACY NOTE: MEDS TO BEDS    Total # of Prescriptions Filled: 5   The following medications were delivered to the patient:  Levothyroxine 300 mcg tab  Atorvastatin 40 mg tab  Zolpidem 10 mg tab  Aspirin 81 mg chew  Cephalexin 500 mg cap    Additional Documentation:

## 2024-02-20 NOTE — CARE COORDINATION
MET WITH PT AT BEDSIDE. UNABLE TO FIND HOME CARE THAT CAN ACCEPT THIS PT INSURANCE, OR HAS ADEQUATE STAFFING. PT STATES SHE WILL HAVE TRANSPORTATION TO GO  TO [facility]OUND CENTER. SHE HAS SUPPLIES TO CHANGE DRESSING AND STATES NURSING SHOWED HER HOW TO CHANGE BANDAGE TO UMBILICAL AREA. PT AGREEABLE TO WOUND CENTER FOR WOUND CARE. MESSAGE SENT TO DR. LANDERS, NURSING UPDATED. UNIT SECRETARY CALLED WOUND CENTER FOR SCHEDULING. PT DENIES FURTHER NEEDS FOR DISCHARGE.

## 2024-03-03 PROCEDURE — 95816 EEG AWAKE AND DROWSY: CPT | Performed by: PSYCHIATRY & NEUROLOGY

## 2024-03-03 NOTE — PROCEDURES
[facility]                   [address] [place] [address]                          ELECTROENCEPHALOGRAM      PATIENT NAME: NEELIMA LOPEZ               : 1975  MED REC NO: [id number]                        ROOM: W283  ACCOUNT NO: [id number]                       ADMIT DATE: 2024  PROVIDER: Francisco Mcfarland MD      MEDICATIONS:  Ativan, Zofran, Lovenox, Keflex.    SUMMARY:  This is a spontaneous EEG recording for this patient with questionable seizures.  The background rhythm of this EEG shows suppressed muscle artifacts.  In between these, there is a definite alpha rhythm of 9 Hz.  Significant muscle artifacts still continue.  EKG was monitored, this is regular.  Photic stimulation does not reveal any photic responses.  Hyperventilation was not done due to cough.    IMPRESSION:  This is an essentially normal EEG recording with suppressed muscle artifacts, though definite alpha rhythm seen.  Clinical course recommended.          FRANCISCO MCFARLAND MD      D:  2024 12:10:06     T:  2024 13:54:09     FORREST/RAUL  Job #:  [id number]     Doc#:  [id number]

## 2024-10-21 ENCOUNTER — HOSPITAL ENCOUNTER (EMERGENCY)
Age: 49
Discharge: HOME | End: 2024-10-21
Payer: COMMERCIAL

## 2024-10-21 VITALS
HEIGHT: 63 IN | OXYGEN SATURATION: 96 % | HEART RATE: 88 BPM | TEMPERATURE: 97.2 F | DIASTOLIC BLOOD PRESSURE: 99 MMHG | WEIGHT: 148 LBS | SYSTOLIC BLOOD PRESSURE: 145 MMHG | BODY MASS INDEX: 26.22 KG/M2 | RESPIRATION RATE: 16 BRPM

## 2024-10-21 DIAGNOSIS — Z76.0 ENCOUNTER FOR MEDICATION REFILL: Primary | ICD-10-CM

## 2024-10-21 PROCEDURE — 99283 EMERGENCY DEPT VISIT LOW MDM: CPT

## 2024-10-21 RX ORDER — LEVOTHYROXINE SODIUM 150 UG/1
300 TABLET ORAL DAILY
Qty: 18 TABLET | Refills: 0 | Status: SHIPPED | OUTPATIENT
Start: 2024-10-29

## 2024-10-21 ASSESSMENT — PAIN - FUNCTIONAL ASSESSMENT: PAIN_FUNCTIONAL_ASSESSMENT: NONE - DENIES PAIN

## 2024-10-21 NOTE — ED PROVIDER NOTES
[facility] ED  EMERGENCY DEPARTMENT ENCOUNTER      Pt Name: Justa Garay  MRN: [id number]  Birthdate 1975  Date of evaluation: 10/21/2024  Provider: PELON Ryder CNP  3:09 PM EDT    CHIEF COMPLAINT       Chief Complaint   Patient presents with    Thyroid Problem     States she has been off her medication x 1 month          HISTORY OF PRESENT ILLNESS   (Location/Symptom, Timing/Onset, Context/Setting, Quality, Duration, Modifying Factors, Severity)  Note limiting factors.   Justa Garay is a 49 y.o. female who presents to the emergency department today for medication refill.  Patient tells me she is out of her thyroid medication.  Patient tells me it has been about 3 weeks that she is taking her medications.  Patient tells me she is not symptomatic at this time.  Patient tells me that she does not have a PCP as she does not get along with the .  Patient tells me she typically gets her medication refills of the emergency department.  Patient has no other complaints on arrival to the ED    The history is provided by medical records and the patient. No  was used.       Nursing Notes were reviewed.    REVIEW OF SYSTEMS    (2-9 systems for level 4, 10 or more for level 5)     Review of Systems   All other systems reviewed and are negative.      Except as noted above the remainder of the review of systems was reviewed and negative.       PAST MEDICAL HISTORY     Past Medical History:   Diagnosis Date    Hypertension     Thyroid disease          SURGICAL HISTORY       Past Surgical History:   Procedure Laterality Date    CHOLECYSTECTOMY      TUBAL LIGATION           CURRENT MEDICATIONS       Previous Medications    ASPIRIN 81 MG CHEWABLE TABLET    Take 1 tablet by mouth daily    ATORVASTATIN (LIPITOR) 40 MG TABLET    Take 1 tablet by mouth nightly    HYDROCHLOROTHIAZIDE (HYDRODIURIL) 25 MG TABLET    Take 1 tablet by mouth daily for 7 days    LEVOTHYROXINE

## 2024-10-21 NOTE — CARE COORDINATION
Met with pt at bedside re: no PCP. FOC given and appt made w Dr. Ivory for 10/29--1000. Pictorial and contact information provided.    Electronically signed by Rani Francisco RN, BSN on 10/21/2024 at 3:26 PM

## 2024-10-29 ENCOUNTER — TELEPHONE (OUTPATIENT)
Dept: FAMILY MEDICINE CLINIC | Age: 49
End: 2024-10-29

## 2024-11-23 ENCOUNTER — HOSPITAL ENCOUNTER (EMERGENCY)
Age: 49
Discharge: HOME | End: 2024-11-23
Attending: EMERGENCY MEDICINE
Payer: COMMERCIAL

## 2024-11-23 ENCOUNTER — APPOINTMENT (OUTPATIENT)
Dept: CT IMAGING | Age: 49
End: 2024-11-23
Payer: COMMERCIAL

## 2024-11-23 ENCOUNTER — APPOINTMENT (OUTPATIENT)
Dept: GENERAL RADIOLOGY | Age: 49
End: 2024-11-23
Payer: COMMERCIAL

## 2024-11-23 VITALS
TEMPERATURE: 98 F | HEIGHT: 63 IN | SYSTOLIC BLOOD PRESSURE: 123 MMHG | RESPIRATION RATE: 17 BRPM | HEART RATE: 78 BPM | DIASTOLIC BLOOD PRESSURE: 76 MMHG | WEIGHT: 140 LBS | BODY MASS INDEX: 24.8 KG/M2 | OXYGEN SATURATION: 100 %

## 2024-11-23 DIAGNOSIS — L02.91 ABSCESS: Primary | ICD-10-CM

## 2024-11-23 DIAGNOSIS — R20.2 PARESTHESIA OF RIGHT UPPER EXTREMITY: ICD-10-CM

## 2024-11-23 LAB
ALBUMIN SERPL-MCNC: 4.1 G/DL (ref 3.5–4.6)
ALP SERPL-CCNC: 102 U/L (ref 40–130)
ALT SERPL-CCNC: 11 U/L (ref 0–33)
ANION GAP SERPL CALCULATED.3IONS-SCNC: 9 MEQ/L (ref 9–15)
AST SERPL-CCNC: 19 U/L (ref 0–35)
BASOPHILS # BLD: 0.1 K/UL (ref 0–0.2)
BASOPHILS NFR BLD: 0.6 %
BILIRUB SERPL-MCNC: <0.2 MG/DL (ref 0.2–0.7)
BUN SERPL-MCNC: 15 MG/DL (ref 6–20)
CALCIUM SERPL-MCNC: 9.5 MG/DL (ref 8.5–9.9)
CHLORIDE SERPL-SCNC: 101 MEQ/L (ref 95–107)
CO2 SERPL-SCNC: 32 MEQ/L (ref 20–31)
CREAT SERPL-MCNC: 0.78 MG/DL (ref 0.5–0.9)
EKG ATRIAL RATE: 79 BPM
EKG P AXIS: 50 DEGREES
EKG P-R INTERVAL: 152 MS
EKG Q-T INTERVAL: 412 MS
EKG QRS DURATION: 98 MS
EKG QTC CALCULATION (BAZETT): 472 MS
EKG R AXIS: 81 DEGREES
EKG T AXIS: 81 DEGREES
EKG VENTRICULAR RATE: 79 BPM
EOSINOPHIL # BLD: 0.2 K/UL (ref 0–0.7)
EOSINOPHIL NFR BLD: 1.7 %
ERYTHROCYTE [DISTWIDTH] IN BLOOD BY AUTOMATED COUNT: 13.8 % (ref 11.5–14.5)
GLOBULIN SER CALC-MCNC: 2.9 G/DL (ref 2.3–3.5)
GLUCOSE SERPL-MCNC: 95 MG/DL (ref 70–99)
HCT VFR BLD AUTO: 44.1 % (ref 37–47)
HGB BLD-MCNC: 14.7 G/DL (ref 12–16)
LYMPHOCYTES # BLD: 3.2 K/UL (ref 1–4.8)
LYMPHOCYTES NFR BLD: 32.3 %
MCH RBC QN AUTO: 28.8 PG (ref 27–31.3)
MCHC RBC AUTO-ENTMCNC: 33.3 % (ref 33–37)
MCV RBC AUTO: 86.5 FL (ref 79.4–94.8)
MONOCYTES # BLD: 0.4 K/UL (ref 0.2–0.8)
MONOCYTES NFR BLD: 3.7 %
NEUTROPHILS # BLD: 6 K/UL (ref 1.4–6.5)
NEUTS SEG NFR BLD: 61.2 %
PLATELET # BLD AUTO: 487 K/UL (ref 130–400)
POTASSIUM SERPL-SCNC: 3.3 MEQ/L (ref 3.4–4.9)
PROT SERPL-MCNC: 7 G/DL (ref 6.3–8)
RBC # BLD AUTO: 5.1 M/UL (ref 4.2–5.4)
SODIUM SERPL-SCNC: 142 MEQ/L (ref 135–144)
TROPONIN, HIGH SENSITIVITY: <6 NG/L (ref 0–19)
WBC # BLD AUTO: 9.9 K/UL (ref 4.8–10.8)

## 2024-11-23 PROCEDURE — 85025 COMPLETE CBC W/AUTO DIFF WBC: CPT

## 2024-11-23 PROCEDURE — 86403 PARTICLE AGGLUT ANTBDY SCRN: CPT

## 2024-11-23 PROCEDURE — 36415 COLL VENOUS BLD VENIPUNCTURE: CPT

## 2024-11-23 PROCEDURE — 87070 CULTURE OTHR SPECIMN AEROBIC: CPT

## 2024-11-23 PROCEDURE — 93005 ELECTROCARDIOGRAM TRACING: CPT | Performed by: EMERGENCY MEDICINE

## 2024-11-23 PROCEDURE — 10060 I&D ABSCESS SIMPLE/SINGLE: CPT

## 2024-11-23 PROCEDURE — 87075 CULTR BACTERIA EXCEPT BLOOD: CPT

## 2024-11-23 PROCEDURE — 6370000000 HC RX 637 (ALT 250 FOR IP): Performed by: EMERGENCY MEDICINE

## 2024-11-23 PROCEDURE — 84484 ASSAY OF TROPONIN QUANT: CPT

## 2024-11-23 PROCEDURE — 70450 CT HEAD/BRAIN W/O DYE: CPT

## 2024-11-23 PROCEDURE — 71045 X-RAY EXAM CHEST 1 VIEW: CPT

## 2024-11-23 PROCEDURE — 99285 EMERGENCY DEPT VISIT HI MDM: CPT

## 2024-11-23 PROCEDURE — 80053 COMPREHEN METABOLIC PANEL: CPT

## 2024-11-23 RX ORDER — DOXYCYCLINE HYCLATE 100 MG
100 TABLET ORAL 2 TIMES DAILY
Qty: 20 TABLET | Refills: 0 | Status: SHIPPED | OUTPATIENT
Start: 2024-11-23 | End: 2024-12-03

## 2024-11-23 RX ORDER — LIDOCAINE HYDROCHLORIDE 20 MG/ML
5 INJECTION, SOLUTION INFILTRATION; PERINEURAL ONCE
Status: DISCONTINUED | OUTPATIENT
Start: 2024-11-23 | End: 2024-11-23 | Stop reason: HOSPADM

## 2024-11-23 RX ORDER — POTASSIUM CHLORIDE 1500 MG/1
20 TABLET, EXTENDED RELEASE ORAL ONCE
Status: COMPLETED | OUTPATIENT
Start: 2024-11-23 | End: 2024-11-23

## 2024-11-23 RX ORDER — LIDOCAINE/PRILOCAINE 2.5 %-2.5%
CREAM (GRAM) TOPICAL ONCE
Status: COMPLETED | OUTPATIENT
Start: 2024-11-23 | End: 2024-11-23

## 2024-11-23 RX ORDER — LEVOTHYROXINE SODIUM 150 UG/1
300 TABLET ORAL DAILY
Qty: 60 TABLET | Refills: 0 | Status: SHIPPED | OUTPATIENT
Start: 2024-11-23 | End: 2024-12-23

## 2024-11-23 RX ADMIN — POTASSIUM CHLORIDE 20 MEQ: 1500 TABLET, EXTENDED RELEASE ORAL at 19:24

## 2024-11-23 RX ADMIN — LIDOCAINE AND PRILOCAINE: 25; 25 CREAM TOPICAL at 17:28

## 2024-11-23 ASSESSMENT — PAIN - FUNCTIONAL ASSESSMENT: PAIN_FUNCTIONAL_ASSESSMENT: 0-10

## 2024-11-23 ASSESSMENT — LIFESTYLE VARIABLES
HOW OFTEN DO YOU HAVE A DRINK CONTAINING ALCOHOL: NEVER
HOW MANY STANDARD DRINKS CONTAINING ALCOHOL DO YOU HAVE ON A TYPICAL DAY: PATIENT DOES NOT DRINK

## 2024-11-23 ASSESSMENT — PAIN SCALES - GENERAL: PAINLEVEL_OUTOF10: 0

## 2024-11-23 NOTE — ED TRIAGE NOTES
Pt arrived with c/o right arm tightness and some tingling since last night.  Pt also c/o an abscess on the left side of her back, states it is draining. Pt states she has had an abscess in the same place before.

## 2024-11-23 NOTE — ED PROVIDER NOTES
findings:    Chest x-ray single view demonstrates unremarkable this time.  There is no evidence of acute congestive process.  No obvious infiltrative process.  Bony structure largely within normal limits.  Unremarkable single view chest x-ray.    CT imaging the brain is interpreted radiologist unremarkable for evidence acute pathology.  Please see radiology interpretation for full detail.    Interpretation per the Radiologist below, if available at the time ofthis note:    XR CHEST PORTABLE   Final Result   1. No acute cardiopulmonary disease.   2. Stable mild linear scarring in the left lateral mid-lower chest.         CT Head W/O Contrast   Final Result   No acute intracranial abnormality.               ED BEDSIDE ULTRASOUND:   Performed by ED Physician - none    LABS:  Labs Reviewed   COMPREHENSIVE METABOLIC PANEL - Abnormal; Notable for the following components:       Result Value    Potassium 3.3 (*)     CO2 32 (*)     All other components within normal limits   CBC WITH AUTO DIFFERENTIAL - Abnormal; Notable for the following components:    Platelets 487 (*)     All other components within normal limits   CULTURE, ANAEROBIC AND AEROBIC   TROPONIN       All other labs were within normal range or not returned as of this dictation.    EMERGENCY DEPARTMENT COURSE and DIFFERENTIAL DIAGNOSIS/MDM:   Vitals:    Vitals:    11/23/24 1730 11/23/24 1800 11/23/24 1830 11/23/24 1900   BP: 118/75 113/79 121/78 123/76   Pulse: 84 83 88 78   Resp: 23 21 24 17   Temp:       TempSrc:       SpO2: 99% 99% 99% 100%   Weight:       Height:           At this time patient's EKG and cardiac markers are unremarkable for evidence of cardiac etiology.  Given nature and onset of symptomatologies without evidence of cardiac findings have low suspicion for cardiac etiology.    Findings may be consistent with radial nerve considerations.  However differential is wide.  Acute neurologic episode however is not broadly appreciated.  Examination  right upper extremity        DISPOSITION/PLAN   DISPOSITION Decision To Discharge 11/23/2024 07:02:16 PM           PATIENT REFERRED TO:  Poppy Ramirez PA  [address] [id number] [phone]    Call today  for follow up Emergency Department visit      DISCHARGE MEDICATIONS:  Discharge Medication List as of 11/23/2024  7:09 PM        START taking these medications    Details   doxycycline hyclate (VIBRA-TABS) 100 MG tablet Take 1 tablet by mouth 2 times daily for 10 days, Disp-20 tablet, R-0Normal                (Please note that portions of this note were completed with a voice recognitionprogram.  Efforts were made to edit the dictations but occasionally words are mis-transcribed.)    Dany Beasley MD (electronically signed)  Attending Emergency Physician          Dany Beasley MD  11/24/24 [id number]

## 2024-11-24 LAB — CULTURE WOUND: NORMAL

## 2024-11-25 LAB
EKG ATRIAL RATE: 79 BPM
EKG P AXIS: 50 DEGREES
EKG P-R INTERVAL: 152 MS
EKG Q-T INTERVAL: 412 MS
EKG QRS DURATION: 98 MS
EKG QTC CALCULATION (BAZETT): 472 MS
EKG R AXIS: 81 DEGREES
EKG T AXIS: 81 DEGREES
EKG VENTRICULAR RATE: 79 BPM

## 2024-11-25 PROCEDURE — 93010 ELECTROCARDIOGRAM REPORT: CPT | Performed by: INTERNAL MEDICINE

## 2024-11-29 LAB — CULTURE WOUND: NORMAL

## 2025-07-12 ENCOUNTER — HOSPITAL ENCOUNTER (EMERGENCY)
Age: 50
Discharge: HOME | End: 2025-07-12
Payer: COMMERCIAL

## 2025-07-12 VITALS
DIASTOLIC BLOOD PRESSURE: 84 MMHG | TEMPERATURE: 97.3 F | HEART RATE: 73 BPM | RESPIRATION RATE: 20 BRPM | OXYGEN SATURATION: 98 % | HEIGHT: 63 IN | SYSTOLIC BLOOD PRESSURE: 119 MMHG | WEIGHT: 152.8 LBS | BODY MASS INDEX: 27.07 KG/M2

## 2025-07-12 DIAGNOSIS — Z76.0 ENCOUNTER FOR MEDICATION REFILL: Primary | ICD-10-CM

## 2025-07-12 PROCEDURE — 99283 EMERGENCY DEPT VISIT LOW MDM: CPT

## 2025-07-12 RX ORDER — LEVOTHYROXINE SODIUM 150 UG/1
150 TABLET ORAL 2 TIMES DAILY
Qty: 60 TABLET | Refills: 0 | Status: SHIPPED | OUTPATIENT
Start: 2025-07-12

## 2025-07-12 ASSESSMENT — ENCOUNTER SYMPTOMS
EYES NEGATIVE: 1
GASTROINTESTINAL NEGATIVE: 1
RESPIRATORY NEGATIVE: 1
ALLERGIC/IMMUNOLOGIC NEGATIVE: 1

## 2025-07-12 ASSESSMENT — PAIN - FUNCTIONAL ASSESSMENT: PAIN_FUNCTIONAL_ASSESSMENT: NONE - DENIES PAIN

## 2025-07-12 ASSESSMENT — LIFESTYLE VARIABLES
HOW MANY STANDARD DRINKS CONTAINING ALCOHOL DO YOU HAVE ON A TYPICAL DAY: 1 OR 2
HOW OFTEN DO YOU HAVE A DRINK CONTAINING ALCOHOL: MONTHLY OR LESS

## 2025-07-12 NOTE — ED PROVIDER NOTES
[facility] EMERGENCY DEPARTMENT  eMERGENCY dEPARTMENT eNCOUnter      Pt Name: Justa Garay  MRN: [id number]  Birthdate 1975  Date of evaluation: 7/12/2025  Provider: PELON Escobedo CNP      HISTORY OF PRESENT ILLNESS    Justa Garay is a 50 y.o. female who presents to the Emergency Department with past medical history of hypertension and thyroid disease.  Patient presents today for medication refill and bump on right leg.  Patient reports she is unable to get into her PCP for her Synthroid medication refill x 1 month.  Patient also reports possible skin tag to back of right upper leg.  Patient is afebrile denies fevers at home.  Patient reports she has been tying string around skin growth to right leg with no relief from symptoms.  Patient denies chest pain, shortness of breath, abdominal pain, nausea, vomiting, chills.  Patient is hemodynamically stable nontoxic-appearing.        REVIEW OF SYSTEMS       Review of Systems   Constitutional: Negative.  Negative for appetite change, chills, fatigue, fever and unexpected weight change.   HENT: Negative.     Eyes: Negative.    Respiratory: Negative.     Cardiovascular: Negative.    Gastrointestinal: Negative.    Endocrine: Negative.    Genitourinary: Negative.    Musculoskeletal: Negative.    Skin:         Patient reports large skin tag to right posterior upper leg.   Allergic/Immunologic: Negative.    Neurological: Negative.    Psychiatric/Behavioral: Negative.           PAST MEDICAL HISTORY     Past Medical History:   Diagnosis Date    Hypertension     Thyroid disease          SURGICAL HISTORY       Past Surgical History:   Procedure Laterality Date    CHOLECYSTECTOMY      TUBAL LIGATION           CURRENT MEDICATIONS       Discharge Medication List as of 7/12/2025  1:30 PM        CONTINUE these medications which have NOT CHANGED    Details   aspirin 81 MG chewable tablet Take 1 tablet by mouth daily, Disp-30 tablet, R-3Normal      atorvastatin  following up with PCP for further medication refills.  [facility] dermatology referral was provided to patient for skin tag removal.  Patient verbalized understanding of plan of care and to return with worsening condition or follow-up with PCP and dermatology.  All vital signs are stable.  No distress was noted upon patient's discharge.    Risk  Prescription drug management.           PROCEDURES:  Unless otherwise noted below, none     Procedures    No orders to display       Coding     FINAL IMPRESSION      1. Encounter for medication refill          DISPOSITION/PLAN   DISPOSITION Decision To Discharge 07/12/2025 01:28:17 PM   DISPOSITION CONDITION Stable               PELON Escobedo CNP (electronically signed)  Attending Emergency Physician  Supervising physician: Falguni Umanzor APRN - CNP  07/12/25 [id number]